# Patient Record
Sex: FEMALE | Race: WHITE | NOT HISPANIC OR LATINO | Employment: FULL TIME | URBAN - METROPOLITAN AREA
[De-identification: names, ages, dates, MRNs, and addresses within clinical notes are randomized per-mention and may not be internally consistent; named-entity substitution may affect disease eponyms.]

---

## 2017-06-06 ENCOUNTER — TELEPHONE (OUTPATIENT)
Dept: OBSTETRICS AND GYNECOLOGY | Facility: CLINIC | Age: 52
End: 2017-06-06

## 2017-06-06 NOTE — TELEPHONE ENCOUNTER
----- Message from Bradley Au sent at 6/6/2017  1:37 PM CDT -----  Contact: Patient   x_ 1st Request  _ 2nd Request  _ 3rd Request    Who: YVAN JOSEPH [[id number]]    Why: Patient is calling in regards to scheduling a annual appointment. Patient is needing a call back.    What Number to Call Back: [phone]    When to Expect a call back: (Before the end of the day)  -- if call after 3:00 call back will be tomorrow.

## 2017-07-18 ENCOUNTER — OFFICE VISIT (OUTPATIENT)
Dept: OBSTETRICS AND GYNECOLOGY | Facility: CLINIC | Age: 52
End: 2017-07-18
Payer: COMMERCIAL

## 2017-07-18 VITALS
SYSTOLIC BLOOD PRESSURE: 122 MMHG | DIASTOLIC BLOOD PRESSURE: 82 MMHG | BODY MASS INDEX: 24.34 KG/M2 | HEIGHT: 66 IN | WEIGHT: 151.44 LBS

## 2017-07-18 DIAGNOSIS — N95.2 VAGINAL ATROPHY: ICD-10-CM

## 2017-07-18 DIAGNOSIS — N89.8 VAGINAL DISCHARGE: ICD-10-CM

## 2017-07-18 DIAGNOSIS — Z12.4 PAP SMEAR FOR CERVICAL CANCER SCREENING: Primary | ICD-10-CM

## 2017-07-18 DIAGNOSIS — Z12.31 VISIT FOR SCREENING MAMMOGRAM: ICD-10-CM

## 2017-07-18 DIAGNOSIS — Z78.0 MENOPAUSE: ICD-10-CM

## 2017-07-18 DIAGNOSIS — Z01.419 WELL WOMAN EXAM WITH ROUTINE GYNECOLOGICAL EXAM: ICD-10-CM

## 2017-07-18 LAB
CANDIDA RRNA VAG QL PROBE: NEGATIVE
G VAGINALIS RRNA GENITAL QL PROBE: NEGATIVE
T VAGINALIS RRNA GENITAL QL PROBE: NEGATIVE

## 2017-07-18 PROCEDURE — 99386 PREV VISIT NEW AGE 40-64: CPT | Mod: S$GLB,,, | Performed by: OBSTETRICS & GYNECOLOGY

## 2017-07-18 PROCEDURE — 87660 TRICHOMONAS VAGIN DIR PROBE: CPT

## 2017-07-18 PROCEDURE — 99999 PR PBB SHADOW E&M-EST. PATIENT-LVL II: CPT | Mod: PBBFAC,,, | Performed by: OBSTETRICS & GYNECOLOGY

## 2017-07-18 PROCEDURE — 87480 CANDIDA DNA DIR PROBE: CPT

## 2017-07-18 PROCEDURE — 88175 CYTOPATH C/V AUTO FLUID REDO: CPT

## 2017-07-18 RX ORDER — ESTRADIOL 0.1 MG/G
1 CREAM VAGINAL DAILY
Qty: 42.5 G | Refills: 1 | Status: SHIPPED | OUTPATIENT
Start: 2017-07-18 | End: 2018-03-13

## 2017-07-18 NOTE — PROGRESS NOTES
Subjective:       Patient ID: Chaya Tam is a 52 y.o. female.    Chief Complaint:  Well Woman (vaginal dryness)      History of Present Illness  HPI  This 52 yr old female former pt of Dr Reece is here for routine exam and has history of abnormal pap yrs ago with cautery after biopsy and no abnormal since.  She is due for pap and mammogram and only complaint is vaginal pain with intercourse and dryness.  We discussed and will start on atrophy protocol.We spent a significant amt of time discussing estrogen replacement theropy.  We discussed the risks and benefits including breast cancer and embolic risk, osteoporosis and heart and colon health benefits.  Pt understands that there are many different ways to take estrogen and many different doses and that she does not have to take long term unless she chooses.  She understands that if she still has her uterus, she will need to take progesterone with this to decrease risk of endometrial cancer.We discussed side effects that might include but not limited to headaches, edema, nauseasness,     GYN & OB History  No LMP recorded. Patient is postmenopausal.   Date of Last Pap: No result found    OB History    Para Term  AB Living   3       1     SAB TAB Ectopic Multiple Live Births   1              # Outcome Date GA Lbr Benson/2nd Weight Sex Delivery Anes PTL Lv   3             2             1 SAB                   Review of Systems  Review of Systems   Constitutional: Positive for fatigue. Negative for chills and fever.   Respiratory: Negative for shortness of breath.    Cardiovascular: Negative for chest pain.   Gastrointestinal: Negative for abdominal pain, nausea and vomiting.   Genitourinary: Positive for dyspareunia. Negative for difficulty urinating, genital sores, menstrual problem, pelvic pain, vaginal bleeding, vaginal discharge and vaginal pain.   Skin: Negative for wound.   Hematological: Negative for adenopathy.           Objective:     Physical Exam:   Constitutional: She is oriented to person, place, and time. She appears well-developed and well-nourished.    HENT:   Head: Normocephalic.    Eyes: EOM are normal.    Neck: Normal range of motion.    Cardiovascular: Normal rate.     Pulmonary/Chest: Effort normal. She exhibits no mass and no tenderness. Right breast exhibits no inverted nipple, no mass, no skin change and no tenderness. Left breast exhibits no inverted nipple, no mass, no skin change and no tenderness.        Abdominal: Soft. She exhibits no distension. There is no tenderness.     Genitourinary: Vagina normal and uterus normal. There is no rash, tenderness or lesion on the right labia. There is no rash, tenderness or lesion on the left labia. Uterus is not tender. Cervix is normal. Right adnexum displays no mass, no tenderness and no fullness. Left adnexum displays no mass, no tenderness and no fullness. Cervix exhibits no discharge.           Musculoskeletal: Normal range of motion.       Neurological: She is alert and oriented to person, place, and time.    Skin: Skin is warm and dry.    Psychiatric: She has a normal mood and affect.          Assessment:        1. Pap smear for cervical cancer screening    2. Visit for screening mammogram    3. Well woman exam with routine gynecological exam    4. Menopause    5. Vaginal atrophy               Plan:      Atrophy protocol  website given to pt  Estrace nightly for 2 weeks then 3 times per week.  Follow up in one month to disucss potential systemic estrogen and testosterone.

## 2017-07-21 ENCOUNTER — HOSPITAL ENCOUNTER (OUTPATIENT)
Dept: RADIOLOGY | Facility: HOSPITAL | Age: 52
Discharge: HOME | End: 2017-07-21
Attending: OBSTETRICS & GYNECOLOGY
Payer: COMMERCIAL

## 2017-07-21 VITALS — HEIGHT: 66 IN | WEIGHT: 151 LBS | BODY MASS INDEX: 24.27 KG/M2

## 2017-07-21 DIAGNOSIS — Z12.31 VISIT FOR SCREENING MAMMOGRAM: ICD-10-CM

## 2017-07-21 PROCEDURE — 77067 SCR MAMMO BI INCL CAD: CPT | Mod: TC

## 2017-07-21 PROCEDURE — 77063 BREAST TOMOSYNTHESIS BI: CPT | Mod: 26,,, | Performed by: RADIOLOGY

## 2017-07-21 PROCEDURE — 77067 SCR MAMMO BI INCL CAD: CPT | Mod: 26,,, | Performed by: RADIOLOGY

## 2017-08-15 ENCOUNTER — TELEPHONE (OUTPATIENT)
Dept: OBSTETRICS AND GYNECOLOGY | Facility: CLINIC | Age: 52
End: 2017-08-15

## 2017-08-15 NOTE — TELEPHONE ENCOUNTER
----- Message from Bradley Au sent at 8/15/2017  9:08 AM CDT -----  Contact: pt  x_ 1st Request  _ 2nd Request  _ 3rd Request    Who: pt    Why: pt is needing to reschedule her follow up appointment    What Number to Call Back: [phone]    When to Expect a call back: (Before the end of the day)  -- if call after 3:00 call back will be tomorrow.

## 2017-09-01 ENCOUNTER — OFFICE VISIT (OUTPATIENT)
Dept: OBSTETRICS AND GYNECOLOGY | Facility: CLINIC | Age: 52
End: 2017-09-01
Attending: OBSTETRICS & GYNECOLOGY
Payer: COMMERCIAL

## 2017-09-01 VITALS
DIASTOLIC BLOOD PRESSURE: 82 MMHG | BODY MASS INDEX: 25.12 KG/M2 | SYSTOLIC BLOOD PRESSURE: 120 MMHG | HEIGHT: 66 IN | WEIGHT: 156.31 LBS

## 2017-09-01 DIAGNOSIS — N95.2 VAGINAL ATROPHY: ICD-10-CM

## 2017-09-01 DIAGNOSIS — Z78.0 MENOPAUSE: Primary | ICD-10-CM

## 2017-09-01 PROCEDURE — 3008F BODY MASS INDEX DOCD: CPT | Mod: S$GLB,,, | Performed by: OBSTETRICS & GYNECOLOGY

## 2017-09-01 PROCEDURE — 99999 PR PBB SHADOW E&M-EST. PATIENT-LVL II: CPT | Mod: PBBFAC,,, | Performed by: OBSTETRICS & GYNECOLOGY

## 2017-09-01 PROCEDURE — 99213 OFFICE O/P EST LOW 20 MIN: CPT | Mod: S$GLB,,, | Performed by: OBSTETRICS & GYNECOLOGY

## 2017-09-01 RX ORDER — ESTRADIOL AND NORETHINDRONE ACETATE 1; .5 MG/1; MG/1
1 TABLET ORAL DAILY
Qty: 30 TABLET | Refills: 11 | Status: SHIPPED | OUTPATIENT
Start: 2017-09-01 | End: 2019-01-11 | Stop reason: CLARIF

## 2017-09-01 NOTE — PROGRESS NOTES
Subjective:       Patient ID: Chaya Tam is a 52 y.o. female.    Chief Complaint:  Follow-up (hormone med review)      History of Present Illness  HPI  This 52 yr old female Is here to follow up on doing the atrophy protocol for painful intercourse and atrophy and she is doing much better.  Today we will talk about systemic estrogen and testosterone.  We spent a significant amt of time discussing estrogen replacement theropy.  We discussed the risks and benefits including breast cancer and embolic risk, osteoporosis and heart and colon health benefits.  Pt understands that there are many different ways to take estrogen and many different doses and that she does not have to take long term unless she chooses.  She understands that if she still has her uterus, she will need to take progesterone with this to decrease risk of endometrial cancer.We discussed side effects that might include but not limited to headaches, edema, nauseasness,   Her symptoms are more subtle but we discussed long term benefits vs risks and she desires.  GYN & OB History  No LMP recorded. Patient is postmenopausal.   Date of Last Pap: 2017    OB History    Para Term  AB Living   4 3 3   1     SAB TAB Ectopic Multiple Live Births   1              # Outcome Date GA Lbr Benson/2nd Weight Sex Delivery Anes PTL Lv   4 Term            3 Term            2 Term            1 SAB                   Review of Systems  Review of Systems   Constitutional: Negative for chills and fever.   Respiratory: Negative for shortness of breath.    Cardiovascular: Negative for chest pain.   Gastrointestinal: Negative for abdominal pain, nausea and vomiting.   Genitourinary: Negative for difficulty urinating, dyspareunia, genital sores, menstrual problem, pelvic pain, vaginal bleeding, vaginal discharge and vaginal pain.   Skin: Negative for wound.   Hematological: Negative for adenopathy.           Objective:    Physical Exam       Assessment:         1. Menopause    2. Vaginal atrophy               Plan:      Start on activella and might be able to stop vaginal estrogen soon.  Follow up in month or two for potientially starting on testosterone

## 2018-02-20 ENCOUNTER — TELEPHONE (OUTPATIENT)
Dept: OBSTETRICS AND GYNECOLOGY | Facility: CLINIC | Age: 53
End: 2018-02-20

## 2018-02-20 NOTE — TELEPHONE ENCOUNTER
----- Message from Jeanette Leon sent at 2/19/2018  4:24 PM CST -----  Contact: self  Pt needing a call back, regarding an appt, pt can be reached at [phone].

## 2018-03-08 ENCOUNTER — OFFICE VISIT (OUTPATIENT)
Dept: OBSTETRICS AND GYNECOLOGY | Facility: CLINIC | Age: 53
End: 2018-03-08
Attending: OBSTETRICS & GYNECOLOGY
Payer: COMMERCIAL

## 2018-03-08 VITALS
HEIGHT: 66 IN | DIASTOLIC BLOOD PRESSURE: 76 MMHG | WEIGHT: 147.25 LBS | SYSTOLIC BLOOD PRESSURE: 110 MMHG | BODY MASS INDEX: 23.66 KG/M2

## 2018-03-08 DIAGNOSIS — N95.0 POST-MENOPAUSAL BLEEDING: Primary | ICD-10-CM

## 2018-03-08 PROCEDURE — 99213 OFFICE O/P EST LOW 20 MIN: CPT | Mod: S$GLB,,, | Performed by: OBSTETRICS & GYNECOLOGY

## 2018-03-08 RX ORDER — VALACYCLOVIR HYDROCHLORIDE 1 G/1
TABLET, FILM COATED ORAL
Refills: 3 | COMMUNITY
Start: 2018-01-29

## 2018-03-08 NOTE — PROGRESS NOTES
Subjective:       Patient ID: Chaya Tam is a 53 y.o. female.    Chief Complaint:  Follow-up      History of Present Illness  HPI  This 53 yr old P3 female is here for post menopausal bleeding.  She was started on activella on top of the atrophy protocol in the fall .  Has done well until last week had three days of bleeding out of the blue.  No pain and now has stopped.  We discussed and will get ultrasound and follow up in month or so to discuss changing her meds and potientially adding testosterone.  We had this discussion before and will probably just change her to combo  [facility].    Pt understands and agrees.    GYN & OB History  No LMP recorded. Patient is postmenopausal.   Date of Last Pap: 2017    OB History    Para Term  AB Living   4 3 3   1     SAB TAB Ectopic Multiple Live Births   1              # Outcome Date GA Lbr Benson/2nd Weight Sex Delivery Anes PTL Lv   4 Term            3 Term            2 Term            1 SAB                   Review of Systems  Review of Systems   Constitutional: Negative for chills and fever.   Respiratory: Negative for shortness of breath.    Cardiovascular: Negative for chest pain.   Gastrointestinal: Negative for abdominal pain, nausea and vomiting.   Genitourinary: Positive for vaginal bleeding. Negative for difficulty urinating, dyspareunia, genital sores, menstrual problem, pelvic pain, vaginal discharge and vaginal pain.   Skin: Negative for wound.   Hematological: Negative for adenopathy.           Objective:    Physical Exam       Assessment:        1. Post-menopausal bleeding               Plan:      Ultrasound and follow up on my chart with results and follow up with me later for hrt changes as above

## 2018-03-09 ENCOUNTER — HOSPITAL ENCOUNTER (OUTPATIENT)
Dept: RADIOLOGY | Facility: OTHER | Age: 53
Discharge: HOME | End: 2018-03-09
Attending: OBSTETRICS & GYNECOLOGY
Payer: COMMERCIAL

## 2018-03-09 DIAGNOSIS — N95.0 POST-MENOPAUSAL BLEEDING: ICD-10-CM

## 2018-03-09 PROCEDURE — 76856 US EXAM PELVIC COMPLETE: CPT | Mod: 26,,, | Performed by: RADIOLOGY

## 2018-03-09 PROCEDURE — 76830 TRANSVAGINAL US NON-OB: CPT | Mod: TC

## 2018-03-09 PROCEDURE — 76830 TRANSVAGINAL US NON-OB: CPT | Mod: 26,,, | Performed by: RADIOLOGY

## 2018-03-13 ENCOUNTER — TELEPHONE (OUTPATIENT)
Dept: OBSTETRICS AND GYNECOLOGY | Facility: CLINIC | Age: 53
End: 2018-03-13

## 2018-03-13 ENCOUNTER — OFFICE VISIT (OUTPATIENT)
Dept: OBSTETRICS AND GYNECOLOGY | Facility: CLINIC | Age: 53
End: 2018-03-13
Attending: OBSTETRICS & GYNECOLOGY
Payer: COMMERCIAL

## 2018-03-13 VITALS
SYSTOLIC BLOOD PRESSURE: 130 MMHG | BODY MASS INDEX: 23.49 KG/M2 | HEIGHT: 66 IN | DIASTOLIC BLOOD PRESSURE: 82 MMHG | WEIGHT: 146.19 LBS

## 2018-03-13 DIAGNOSIS — N95.0 POST-MENOPAUSAL BLEEDING: Primary | ICD-10-CM

## 2018-03-13 PROCEDURE — 58100 BIOPSY OF UTERUS LINING: CPT | Mod: S$GLB,,, | Performed by: OBSTETRICS & GYNECOLOGY

## 2018-03-13 PROCEDURE — 88305 TISSUE EXAM BY PATHOLOGIST: CPT | Mod: 26,,, | Performed by: PATHOLOGY

## 2018-03-13 PROCEDURE — 99213 OFFICE O/P EST LOW 20 MIN: CPT | Mod: 25,S$GLB,, | Performed by: OBSTETRICS & GYNECOLOGY

## 2018-03-13 PROCEDURE — 88305 TISSUE EXAM BY PATHOLOGIST: CPT | Performed by: PATHOLOGY

## 2018-03-13 NOTE — TELEPHONE ENCOUNTER
----- Message from Pam Reynolds MD sent at 3/13/2018 10:48 AM CDT -----  Please call in to [facility]    Estradiol 2mg/progesterone 300 mg/5mg capsule  Take one capsule nightly  Disp # 30 with 11 refils.

## 2018-03-13 NOTE — PROGRESS NOTES
This 53 yr old female is here for and endometrial biopsy.  She had ultrasound done for post menopausal bleeding  and was found to have thickened endometrial near the cervix and is menopausal.  We discussed the risks and benefits of the procedure and she signed her informed consent.  While in dorso lithotomy position and after uterus confirmed to be anteflexed, cervix was cleansed with betadine and the anterior cervix was grasped with a single toothed tenaculum and the sound was easily passed to 7cm and pipelle was easily passed as well and adequate tissue obtained.  All instruments were then removed and pt tolerated the procedure well.  Will follow up on [facility] with results  Specimen sent to pathology

## 2018-03-20 ENCOUNTER — TELEPHONE (OUTPATIENT)
Dept: OBSTETRICS AND GYNECOLOGY | Facility: CLINIC | Age: 53
End: 2018-03-20

## 2018-03-20 NOTE — TELEPHONE ENCOUNTER
----- Message from Bradley Au sent at 3/20/2018  3:11 PM CDT -----  Contact: pt  x_ 1st Request  _ 2nd Request  _ 3rd Request    Who: pt    Why: test results    What Number to Call Back: [phone]    When to Expect a call back: (Before the end of the day)  -- if call after 3:00 call back will be tomorrow.

## 2018-03-20 NOTE — TELEPHONE ENCOUNTER
Spoke with patient and informed her EMBX results are negative. No abnormality found. Patient verbalized understanding all information

## 2018-10-08 ENCOUNTER — PATIENT MESSAGE (OUTPATIENT)
Dept: OBSTETRICS AND GYNECOLOGY | Facility: CLINIC | Age: 53
End: 2018-10-08

## 2019-01-11 ENCOUNTER — ANESTHESIA EVENT (OUTPATIENT)
Dept: SURGERY | Facility: OTHER | Age: 54
End: 2019-01-11
Payer: COMMERCIAL

## 2019-01-11 ENCOUNTER — HOSPITAL ENCOUNTER (OUTPATIENT)
Dept: PREADMISSION TESTING | Facility: OTHER | Age: 54
Discharge: HOME | End: 2019-01-11
Attending: ORTHOPAEDIC SURGERY
Payer: COMMERCIAL

## 2019-01-11 VITALS
HEIGHT: 66 IN | RESPIRATION RATE: 16 BRPM | TEMPERATURE: 98 F | WEIGHT: 150 LBS | BODY MASS INDEX: 24.11 KG/M2 | OXYGEN SATURATION: 99 % | HEART RATE: 66 BPM | DIASTOLIC BLOOD PRESSURE: 78 MMHG | SYSTOLIC BLOOD PRESSURE: 127 MMHG

## 2019-01-11 RX ORDER — FAMOTIDINE 20 MG/1
20 TABLET, FILM COATED ORAL
Status: CANCELLED | OUTPATIENT
Start: 2019-01-11 | End: 2019-01-11

## 2019-01-11 RX ORDER — OXYCODONE HYDROCHLORIDE 5 MG/1
5 TABLET ORAL ONCE AS NEEDED
Status: CANCELLED | OUTPATIENT
Start: 2019-01-11 | End: 2030-06-09

## 2019-01-11 RX ORDER — LIDOCAINE HYDROCHLORIDE 10 MG/ML
0.5 INJECTION, SOLUTION EPIDURAL; INFILTRATION; INTRACAUDAL; PERINEURAL ONCE
Status: CANCELLED | OUTPATIENT
Start: 2019-01-11 | End: 2019-01-11

## 2019-01-11 RX ORDER — CHOLECALCIFEROL (VITAMIN D3) 25 MCG
1000 TABLET ORAL DAILY
COMMUNITY
End: 2021-11-10 | Stop reason: SDUPTHER

## 2019-01-11 RX ORDER — SODIUM CHLORIDE, SODIUM LACTATE, POTASSIUM CHLORIDE, CALCIUM CHLORIDE 600; 310; 30; 20 MG/100ML; MG/100ML; MG/100ML; MG/100ML
INJECTION, SOLUTION INTRAVENOUS CONTINUOUS
Status: CANCELLED | OUTPATIENT
Start: 2019-01-11

## 2019-01-11 RX ORDER — MIDAZOLAM HYDROCHLORIDE 5 MG/ML
5 INJECTION INTRAMUSCULAR; INTRAVENOUS ONCE AS NEEDED
Status: CANCELLED | OUTPATIENT
Start: 2019-01-11 | End: 2030-06-09

## 2019-01-11 NOTE — DISCHARGE INSTRUCTIONS
PRE-ADMIT TESTING -  [phone]    [address]  [place] [facility]          Your surgery has been scheduled at [facility]. We are pleased to have the opportunity to serve you. For Further Information please call [phone].    On the day of surgery please report to the Information Desk on the 1st floor.    · CONTACT YOUR PHYSICIAN'S OFFICE THE DAY PRIOR TO YOUR SURGERY TO OBTAIN YOUR ARRIVAL TIME.     · The evening before surgery do not eat anything after 9 p.m. ( this includes hard candy, chewing gum and mints).  You may only have GATORADE, POWERADE AND WATER  from 9 p.m. until you leave your home.   DO NOT DRINK ANY LIQUIDS ON THE WAY TO THE HOSPITAL.      SPECIAL MEDICATION INSTRUCTIONS: TAKE medications checked off by the Anesthesiologist on your Medication List.    Angiogram Patients: Take medications as instructed by your physician, including aspirin.     Surgery Patients:    If you take ASPIRIN - Your PHYSICIAN/SURGEON will need to inform you IF/OR when you need to stop taking aspirin prior to your surgery.     Do Not take any medications containing IBUPROFEN.  Do Not Wear any make-up or dark nail polish   (especially eye make-up) to surgery. If you come to surgery with makeup on you will be required to remove the makeup or nail polish.    Do not shave your surgical area at least 5 days prior to your surgery. The surgical prep will be performed at the hospital according to Infection Control regulations.    Leave all valuables at home.   Do Not wear any jewelry or watches, including any metal in body piercings.  Contact Lens must be removed before surgery. Either do not wear the contact lens or bring a case and solution for storage.  Please bring a container for eyeglasses or dentures as required.  Bring any paperwork your physician has provided, such as consent forms,  history and physicals, doctor's orders, etc.   Bring comfortable clothes that are loose fitting to wear upon  discharge. Take into consideration the type of surgery being performed.  Maintain your diet as advised per your physician the day prior to surgery.      Adequate rest the night before surgery is advised.   Park in the Parking lot behind the hospital or in the [facility] across the street from the parking lot. Parking is complimentary.  If you will be discharged the same day as your procedure, please arrange for a responsible adult to drive you home or to accompany you if traveling by taxi.   YOU WILL NOT BE PERMITTED TO DRIVE OR TO LEAVE THE HOSPITAL ALONE AFTER SURGERY.   It is strongly recommended that you arrange for someone to remain with you for the first 24 hrs following your surgery.       Thank you for your cooperation.  The Staff of [facility].        Bathing Instructions                                                                 Please shower the evening before and morning of your procedure with    ANTIBACTERIAL SOAP. ( DIAL, etc )  Concentrate on the surgical area   for at least 3 minutes and rinse completely. Dry off as usual.   Do not use any deodorant, powder, body lotions, perfume, after shave or    cologne.

## 2019-01-11 NOTE — ANESTHESIA PREPROCEDURE EVALUATION
01/11/2019  Chaya Tam is a 53 y.o., female.    Anesthesia Evaluation    I have reviewed the Patient Summary Reports.    I have reviewed the Nursing Notes.   I have reviewed the Medications.     Review of Systems  Anesthesia Hx:  No problems with previous Anesthesia    Social:  Social Alcohol Use, Non-Smoker    Hematology/Oncology:  Hematology Normal   Oncology Normal     EENT/Dental:EENT/Dental Normal   Cardiovascular:  Cardiovascular Normal Exercise tolerance: good  H/O palpitations.   Pulmonary:  Pulmonary Normal    Renal/:   Chronic Renal Disease renal calculi    Hepatic/GI:  Hepatic/GI Normal    Neurological:  Neurology Normal    Endocrine:  Endocrine Normal    Dermatological:  Skin Normal    Psych:  Psychiatric Normal           Physical Exam  General:  Well nourished    Airway/Jaw/Neck:  Airway Findings: Mouth Opening: Normal Tongue: Normal  General Airway Assessment: Adult, Good  Mallampati: I  TM Distance: Normal, at least 6 cm  Jaw/Neck Findings:  Neck ROM: Normal ROM      Dental:  Dental Findings: In tact, molar caps        Mental Status:  Mental Status Findings:  Cooperative, Alert and Oriented         Anesthesia Plan  Type of Anesthesia, risks & benefits discussed:  Anesthesia Type:  general  Patient's Preference:   Intra-op Monitoring Plan: standard ASA monitors  Intra-op Monitoring Plan Comments:   Post Op Pain Control Plan: per primary service following discharge from PACU  Post Op Pain Control Plan Comments:   Induction:    Beta Blocker:         Informed Consent: Patient understands risks and agrees with Anesthesia plan.  Questions answered. Anesthesia consent signed with patient.  ASA Score: 1     Day of Surgery Review of History & Physical:    H&P update referred to the surgeon.     Anesthesia Plan Notes: No labs.        Ready For Surgery From Anesthesia Perspective.

## 2019-01-14 NOTE — H&P
Subjective:     Rescheduled from a few weeks ago.  Popping tenderness left knee medial meniscusseen on MRI many month history symptoms no improvement admitted for left knee arthroscopy    Patient Active Problem List    Diagnosis Date Noted    Palpitations 03/20/2015     Past Medical History:   Diagnosis Date    Kidney stones       Past Surgical History:   Procedure Laterality Date    DILATION AND CURETTAGE OF UTERUS      kidney stones        No medications prior to admission.     Review of patient's allergies indicates:   Allergen Reactions    Bactrim [sulfamethoxazole-trimethoprim] Rash      Social History     Tobacco Use    Smoking status: Never Smoker    Smokeless tobacco: Never Used   Substance Use Topics    Alcohol use: Yes     Alcohol/week: 3.6 oz     Types: 3 Glasses of wine, 3 Cans of beer per week     Comment: socially      Family History   Problem Relation Age of Onset    Stroke Mother     Breast cancer Sister     Heart attack Neg Hx     Heart disease Neg Hx     Colon cancer Neg Hx     Ovarian cancer Neg Hx       Review of Systems  Pertinent items are noted in HPI.    Objective:     No data found.  Heart regular lungs clear tenderness posterior and lateral    Imaging Review  Medial meniscal tear minimal degenerative changes    Assessment:     There are no hospital problems to display for this patient.      Plan:     The various methods of treatment have been discussed with the patient and family.   After consideration of risks, benefits and other options for treatment, the patient has consented to surgical interventions (Significant wrist discussedwith age and progressive arthritis).  Questions were answered and Pre-op teaching was done by me.

## 2019-01-18 ENCOUNTER — ANESTHESIA (OUTPATIENT)
Dept: SURGERY | Facility: OTHER | Age: 54
End: 2019-01-18
Payer: COMMERCIAL

## 2019-01-18 ENCOUNTER — HOSPITAL ENCOUNTER (OUTPATIENT)
Facility: OTHER | Age: 54
Discharge: HOME | End: 2019-01-18
Attending: ORTHOPAEDIC SURGERY | Admitting: ORTHOPAEDIC SURGERY
Payer: COMMERCIAL

## 2019-01-18 VITALS
SYSTOLIC BLOOD PRESSURE: 131 MMHG | HEIGHT: 66 IN | DIASTOLIC BLOOD PRESSURE: 76 MMHG | TEMPERATURE: 98 F | OXYGEN SATURATION: 100 % | RESPIRATION RATE: 16 BRPM | HEART RATE: 72 BPM | WEIGHT: 150 LBS | BODY MASS INDEX: 24.11 KG/M2

## 2019-01-18 DIAGNOSIS — S83.249A MMT (MEDIAL MENISCUS TEAR): ICD-10-CM

## 2019-01-18 PROCEDURE — 25000003 PHARM REV CODE 250: Performed by: ANESTHESIOLOGY

## 2019-01-18 PROCEDURE — 37000009 HC ANESTHESIA EA ADD 15 MINS: Performed by: ORTHOPAEDIC SURGERY

## 2019-01-18 PROCEDURE — 36000710: Performed by: ORTHOPAEDIC SURGERY

## 2019-01-18 PROCEDURE — 63600175 PHARM REV CODE 636 W HCPCS: Performed by: SPECIALIST

## 2019-01-18 PROCEDURE — 71000033 HC RECOVERY, INTIAL HOUR: Performed by: ORTHOPAEDIC SURGERY

## 2019-01-18 PROCEDURE — 25000003 PHARM REV CODE 250: Performed by: SPECIALIST

## 2019-01-18 PROCEDURE — 25000003 PHARM REV CODE 250: Performed by: NURSE ANESTHETIST, CERTIFIED REGISTERED

## 2019-01-18 PROCEDURE — 37000008 HC ANESTHESIA 1ST 15 MINUTES: Performed by: ORTHOPAEDIC SURGERY

## 2019-01-18 PROCEDURE — 63600175 PHARM REV CODE 636 W HCPCS: Performed by: ORTHOPAEDIC SURGERY

## 2019-01-18 PROCEDURE — 71000016 HC POSTOP RECOV ADDL HR: Performed by: ORTHOPAEDIC SURGERY

## 2019-01-18 PROCEDURE — 63600175 PHARM REV CODE 636 W HCPCS: Performed by: NURSE ANESTHETIST, CERTIFIED REGISTERED

## 2019-01-18 PROCEDURE — 36000711: Performed by: ORTHOPAEDIC SURGERY

## 2019-01-18 PROCEDURE — 27201423 OPTIME MED/SURG SUP & DEVICES STERILE SUPPLY: Performed by: ORTHOPAEDIC SURGERY

## 2019-01-18 PROCEDURE — 71000015 HC POSTOP RECOV 1ST HR: Performed by: ORTHOPAEDIC SURGERY

## 2019-01-18 RX ORDER — SODIUM CHLORIDE 9 MG/ML
INJECTION, SOLUTION INTRAVENOUS CONTINUOUS
Status: ACTIVE | OUTPATIENT
Start: 2019-01-18

## 2019-01-18 RX ORDER — LIDOCAINE HCL/PF 100 MG/5ML
SYRINGE (ML) INTRAVENOUS
Status: DISCONTINUED | OUTPATIENT
Start: 2019-01-18 | End: 2019-01-18

## 2019-01-18 RX ORDER — SODIUM CHLORIDE 0.9 % (FLUSH) 0.9 %
3 SYRINGE (ML) INJECTION
Status: DISCONTINUED | OUTPATIENT
Start: 2019-01-18 | End: 2019-01-18 | Stop reason: HOSPADM

## 2019-01-18 RX ORDER — ONDANSETRON HYDROCHLORIDE 2 MG/ML
INJECTION, SOLUTION INTRAMUSCULAR; INTRAVENOUS
Status: DISCONTINUED | OUTPATIENT
Start: 2019-01-18 | End: 2019-01-18

## 2019-01-18 RX ORDER — MIDAZOLAM HYDROCHLORIDE 5 MG/ML
5 INJECTION INTRAMUSCULAR; INTRAVENOUS ONCE AS NEEDED
Status: COMPLETED | OUTPATIENT
Start: 2019-01-18 | End: 2019-01-18

## 2019-01-18 RX ORDER — SODIUM CHLORIDE, SODIUM LACTATE, POTASSIUM CHLORIDE, CALCIUM CHLORIDE 600; 310; 30; 20 MG/100ML; MG/100ML; MG/100ML; MG/100ML
INJECTION, SOLUTION INTRAVENOUS CONTINUOUS
Status: DISCONTINUED | OUTPATIENT
Start: 2019-01-18 | End: 2019-01-18 | Stop reason: HOSPADM

## 2019-01-18 RX ORDER — GLYCOPYRROLATE 0.2 MG/ML
INJECTION INTRAMUSCULAR; INTRAVENOUS
Status: DISCONTINUED | OUTPATIENT
Start: 2019-01-18 | End: 2019-01-18

## 2019-01-18 RX ORDER — OXYCODONE HYDROCHLORIDE 5 MG/1
5 TABLET ORAL ONCE AS NEEDED
Status: DISCONTINUED | OUTPATIENT
Start: 2019-01-18 | End: 2019-01-18 | Stop reason: HOSPADM

## 2019-01-18 RX ORDER — FENTANYL CITRATE 50 UG/ML
25 INJECTION, SOLUTION INTRAMUSCULAR; INTRAVENOUS EVERY 5 MIN PRN
Status: DISCONTINUED | OUTPATIENT
Start: 2019-01-18 | End: 2019-01-18 | Stop reason: HOSPADM

## 2019-01-18 RX ORDER — PHENYLEPHRINE HYDROCHLORIDE 10 MG/ML
INJECTION INTRAVENOUS
Status: DISCONTINUED | OUTPATIENT
Start: 2019-01-18 | End: 2019-01-18

## 2019-01-18 RX ORDER — HYDROCODONE BITARTRATE AND ACETAMINOPHEN 5; 325 MG/1; MG/1
1 TABLET ORAL EVERY 6 HOURS PRN
Qty: 30 TABLET | Refills: 0 | Status: SHIPPED | OUTPATIENT
Start: 2019-01-18 | End: 2020-08-18 | Stop reason: DRUGHIGH

## 2019-01-18 RX ORDER — OXYCODONE HYDROCHLORIDE 5 MG/1
5 TABLET ORAL
Status: DISCONTINUED | OUTPATIENT
Start: 2019-01-18 | End: 2019-01-18 | Stop reason: HOSPADM

## 2019-01-18 RX ORDER — CEFAZOLIN SODIUM 2 G/50ML
2 SOLUTION INTRAVENOUS
Status: DISCONTINUED | OUTPATIENT
Start: 2019-01-18 | End: 2019-01-18

## 2019-01-18 RX ORDER — LIDOCAINE HYDROCHLORIDE 10 MG/ML
0.5 INJECTION, SOLUTION EPIDURAL; INFILTRATION; INTRACAUDAL; PERINEURAL ONCE
Status: DISCONTINUED | OUTPATIENT
Start: 2019-01-18 | End: 2019-01-18 | Stop reason: HOSPADM

## 2019-01-18 RX ORDER — HYDROCODONE BITARTRATE AND ACETAMINOPHEN 5; 325 MG/1; MG/1
1 TABLET ORAL EVERY 4 HOURS PRN
Status: DISCONTINUED | OUTPATIENT
Start: 2019-01-18 | End: 2019-01-18 | Stop reason: HOSPADM

## 2019-01-18 RX ORDER — ONDANSETRON 2 MG/ML
4 INJECTION INTRAMUSCULAR; INTRAVENOUS DAILY PRN
Status: DISCONTINUED | OUTPATIENT
Start: 2019-01-18 | End: 2019-01-18 | Stop reason: HOSPADM

## 2019-01-18 RX ORDER — CEFAZOLIN SODIUM 1 G/3ML
2 INJECTION, POWDER, FOR SOLUTION INTRAMUSCULAR; INTRAVENOUS
Status: COMPLETED | OUTPATIENT
Start: 2019-01-18 | End: 2019-01-18

## 2019-01-18 RX ORDER — FAMOTIDINE 20 MG/1
20 TABLET, FILM COATED ORAL
Status: COMPLETED | OUTPATIENT
Start: 2019-01-18 | End: 2019-01-18

## 2019-01-18 RX ORDER — PROPOFOL 10 MG/ML
VIAL (ML) INTRAVENOUS
Status: DISCONTINUED | OUTPATIENT
Start: 2019-01-18 | End: 2019-01-18

## 2019-01-18 RX ORDER — SODIUM CHLORIDE 0.9 % (FLUSH) 0.9 %
5 SYRINGE (ML) INJECTION
Status: DISCONTINUED | OUTPATIENT
Start: 2019-01-18 | End: 2019-01-18 | Stop reason: HOSPADM

## 2019-01-18 RX ORDER — ROPIVACAINE HYDROCHLORIDE 5 MG/ML
INJECTION, SOLUTION EPIDURAL; INFILTRATION; PERINEURAL
Status: DISCONTINUED | OUTPATIENT
Start: 2019-01-18 | End: 2019-01-18 | Stop reason: HOSPADM

## 2019-01-18 RX ORDER — FENTANYL CITRATE 50 UG/ML
INJECTION, SOLUTION INTRAMUSCULAR; INTRAVENOUS
Status: DISCONTINUED | OUTPATIENT
Start: 2019-01-18 | End: 2019-01-18

## 2019-01-18 RX ORDER — HYDROCODONE BITARTRATE AND ACETAMINOPHEN 10; 325 MG/1; MG/1
1 TABLET ORAL EVERY 4 HOURS PRN
Status: DISCONTINUED | OUTPATIENT
Start: 2019-01-18 | End: 2019-01-18 | Stop reason: HOSPADM

## 2019-01-18 RX ORDER — DEXAMETHASONE SODIUM PHOSPHATE 4 MG/ML
INJECTION, SOLUTION INTRA-ARTICULAR; INTRALESIONAL; INTRAMUSCULAR; INTRAVENOUS; SOFT TISSUE
Status: DISCONTINUED | OUTPATIENT
Start: 2019-01-18 | End: 2019-01-18

## 2019-01-18 RX ORDER — KETOROLAC TROMETHAMINE 30 MG/ML
INJECTION, SOLUTION INTRAMUSCULAR; INTRAVENOUS
Status: DISCONTINUED | OUTPATIENT
Start: 2019-01-18 | End: 2019-01-18 | Stop reason: HOSPADM

## 2019-01-18 RX ORDER — MEPERIDINE HYDROCHLORIDE 25 MG/ML
12.5 INJECTION INTRAMUSCULAR; INTRAVENOUS; SUBCUTANEOUS ONCE AS NEEDED
Status: DISCONTINUED | OUTPATIENT
Start: 2019-01-18 | End: 2019-01-18 | Stop reason: HOSPADM

## 2019-01-18 RX ADMIN — PHENYLEPHRINE HYDROCHLORIDE 200 MCG: 10 INJECTION INTRAVENOUS at 07:01

## 2019-01-18 RX ADMIN — LIDOCAINE HYDROCHLORIDE 80 MG: 20 INJECTION, SOLUTION INTRAVENOUS at 06:01

## 2019-01-18 RX ADMIN — SODIUM CHLORIDE, SODIUM LACTATE, POTASSIUM CHLORIDE, AND CALCIUM CHLORIDE: 600; 310; 30; 20 INJECTION, SOLUTION INTRAVENOUS at 06:01

## 2019-01-18 RX ADMIN — OXYCODONE HYDROCHLORIDE 5 MG: 5 TABLET ORAL at 07:01

## 2019-01-18 RX ADMIN — FENTANYL CITRATE 100 MCG: 50 INJECTION, SOLUTION INTRAMUSCULAR; INTRAVENOUS at 06:01

## 2019-01-18 RX ADMIN — ONDANSETRON 4 MG: 2 INJECTION, SOLUTION INTRAMUSCULAR; INTRAVENOUS at 07:01

## 2019-01-18 RX ADMIN — DEXAMETHASONE SODIUM PHOSPHATE 4 MG: 4 INJECTION, SOLUTION INTRAMUSCULAR; INTRAVENOUS at 07:01

## 2019-01-18 RX ADMIN — CEFAZOLIN 2 G: 330 INJECTION, POWDER, FOR SOLUTION INTRAMUSCULAR; INTRAVENOUS at 07:01

## 2019-01-18 RX ADMIN — PROPOFOL 160 MG: 10 INJECTION, EMULSION INTRAVENOUS at 06:01

## 2019-01-18 RX ADMIN — GLYCOPYRROLATE 0.2 MG: 0.2 INJECTION, SOLUTION INTRAMUSCULAR; INTRAVENOUS at 06:01

## 2019-01-18 RX ADMIN — FAMOTIDINE 20 MG: 20 TABLET, FILM COATED ORAL at 06:01

## 2019-01-18 RX ADMIN — MIDAZOLAM HYDROCHLORIDE 5 MG: 5 INJECTION, SOLUTION INTRAMUSCULAR; INTRAVENOUS at 06:01

## 2019-01-18 NOTE — TRANSFER OF CARE
"Anesthesia Transfer of Care Note    Patient: Chaya Tam    Procedure(s) Performed: Procedure(s) (LRB):  ARTHROSCOPY, KNEE (Left)  CARTILAGE-SHAVING (Left)  MENISCECTOMY, KNEE, MEDIAL (Left)    Patient location: PACU    Anesthesia Type: general    Transport from OR: Transported from OR on 2-3 L/min O2 by NC with adequate spontaneous ventilation    Post pain: adequate analgesia    Post assessment: no apparent anesthetic complications    Post vital signs: stable    Level of consciousness: awake and responds to stimulation    Nausea/Vomiting: no nausea/vomiting    Complications: none    Transfer of care protocol was followed      Last vitals:   Visit Vitals  /80 (BP Location: Right arm, Patient Position: Lying)   Pulse 92   Temp 36.5 °C (97.7 °F)   Resp 16   Ht 5' 6" (1.676 m)   Wt 68 kg (150 lb 0 oz)   SpO2 100%   Breastfeeding? No   BMI 24.21 kg/m²     "

## 2019-01-18 NOTE — BRIEF OP NOTE
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    Hospital Course (synopsis of major diagnoses, care, treatment, and services provided during the course of the hospital stay): The above patient underwent the above outpatient procedure. The patient tolerated procedure well and will be discharged today.--see orders.       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        CONTINUE taking these medications    NON FORMULARY MEDICATION  Take 1 capsule by mouth nightly. ESTRADIOL 2mg /  "PROGESTERONE 300mg / TESTOSTERONE 5mg COMPOUNDED CAPSULE.     valACYclovir 1000 MG tablet  Commonly known as:  VALTREX  TK 1 T PO BID PRN     vitamin D 1000 units Tab  Commonly known as:  VITAMIN D3  Take 1,000 Units by mouth once daily.     VYVANSE 40 MG Cap  Generic drug:  lisdexamfetamine  40 mg once daily.          Discharge Procedure Orders   CRUTCHES FOR HOME USE     Order Specific Question Answer Comments   Type: Axillary    Height: 5' 6" (1.676 m)    Weight: 68 kg (150 lb 0 oz)    Length of need (1-99 months): 99      Diet general     Passive ROM, SLR, Quad sets     Activity as tolerated     Sponge bath only until clinic visit     Weight bearing restrictions (specify)   Order Comments: Weight Bearing as tolerated using crutches as needed.     No driving, operating heavy equipment or signing legal documents while taking pain medication     Call MD for:  temperature >100.4     Call MD for:  persistent nausea and vomiting     Call MD for:  severe uncontrolled pain     Call MD for:  difficulty breathing, headache or visual disturbances     Call MD for:  redness, tenderness, or signs of infection (pain, swelling, redness, odor or green/yellow discharge around incision site)     Leave dressing on - Keep it clean, dry, and intact until clinic visit     Follow-up Information     Please follow up.    Why:  AS SCHEDULED , Call [phone] to reach Dr. Silveira               "

## 2019-01-18 NOTE — ANESTHESIA POSTPROCEDURE EVALUATION
"Anesthesia Post Evaluation    Patient: Chaya Tam    Procedure(s) Performed: Procedure(s) (LRB):  ARTHROSCOPY, KNEE (Left)  CARTILAGE-SHAVING (Left)  MENISCECTOMY, KNEE, MEDIAL (Left)    Final Anesthesia Type: general  Patient location during evaluation: PACU  Patient participation: Yes- Able to Participate  Level of consciousness: awake and alert  Post-procedure vital signs: reviewed and stable  Pain management: adequate  Airway patency: patent  PONV status at discharge: No PONV  Anesthetic complications: no      Cardiovascular status: blood pressure returned to baseline  Respiratory status: unassisted, spontaneous ventilation and room air  Hydration status: euvolemic  Follow-up not needed.        Visit Vitals  /89   Pulse 78   Temp 36.8 °C (98.3 °F) (Oral)   Resp 16   Ht 5' 6" (1.676 m)   Wt 68 kg (150 lb 0 oz)   SpO2 100%   Breastfeeding? No   BMI 24.21 kg/m²       Pain/Jose Roberto Score: Pain Rating Prior to Med Admin: 3 (1/18/2019  7:40 AM)  Jose Roberto Score: 10 (1/18/2019  8:19 AM)        "

## 2019-01-18 NOTE — ANESTHESIA POSTPROCEDURE EVALUATION
"Anesthesia Post Evaluation    Patient: Chaya Tam    Procedure(s) Performed: Procedure(s) (LRB):  ARTHROSCOPY, KNEE (Left)  CARTILAGE-SHAVING (Left)  MENISCECTOMY, KNEE, MEDIAL (Left)    Final Anesthesia Type: general  Patient location during evaluation: PACU  Patient participation: Yes- Able to Participate  Level of consciousness: awake and alert  Post-procedure vital signs: reviewed and stable  Pain management: adequate  Airway patency: patent  PONV status at discharge: No PONV  Anesthetic complications: no      Cardiovascular status: hemodynamically stable  Respiratory status: unassisted  Hydration status: euvolemic  Follow-up not needed.        Visit Vitals  /84   Pulse 68   Temp 36.8 °C (98.3 °F) (Oral)   Resp 16   Ht 5' 6" (1.676 m)   Wt 68 kg (150 lb 0 oz)   SpO2 100%   Breastfeeding? No   BMI 24.21 kg/m²       Pain/Jose Roberto Score: Pain Rating Prior to Med Admin: 3 (1/18/2019  7:40 AM)  Jose Roberto Score: 10 (1/18/2019  8:55 AM)        "

## 2019-01-18 NOTE — OP NOTE
DATE OF PROCEDURE: 01/18/2019     CHIEF COMPLAINT AND PRESENT ILLNESS:   53-year-old with recurring pain and swelling left knee been going on for many months.  MRI revealed meniscal tear.  Because of the persistent symptoms patient elected for left knee arthroscopy fully understanding significant risks benefits especially that progressive arthritis     PREOPERATIVE DIAGNOSIS:  Left knee medial meniscal tear     POSTOPERATIVE DIAGNOSIS:   same     PROCEDURES PERFORMED:   left knee arthroscopy partial medial meniscectomy    SURGEON:  Jacek Silveira M.D.     ASSISTANT:  Kisha Garcia CST.     COMPLICATIONS:   none     ANESTHESIA:  General     BLOOD LOSS:   8     IMPLANTS:  None     PROCEDURE IN DETAIL:   patient was brought to the operating room underwent general anesthesia without difficulty.  Exam under anesthesia showed trace to 1+ effusion full range of motion good stability she was appropriately positioned tourniquet applied 250 mm mercury after Esmarch.  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  Medial gutter clear medial joint line had a complex posterior half medial meniscal tear.  Had undersurface tear that was flipped underneath as well as vertical horizontal splits.  With the baskets and shaver the remaining meniscus was stable.  She had grade 3 chondromalacia changes at the posterior aspect of the medial femoral condyle.  She had a 3 mm full-thickness lesion at the posterior medial femoral condyle.  The tibial plateau was less involved.  The notch was clear anterior cruciate ligament clear lateral joint line clear no chondromalacia lateral meniscus good shape.  Two more look around the joint.  A thorough lavage performed. 0.5% ropivacaine was instilled in the soft tissues she was placed in bulky  sterile dressing and tourniquet released at 8 min tolerated procedure well brought to recovery in stable fashion

## 2019-01-18 NOTE — INTERVAL H&P NOTE
The patient has been examined and the H&P has been reviewed:    I concur with the findings and no changes have occurred since H&P was written.    Anesthesia/Surgery risks, benefits and alternative options discussed and understood by patient/family.          Active Hospital Problems    Diagnosis  POA    *MMT (medial meniscus tear) [S86.267A]  Yes      Resolved Hospital Problems   No resolved problems to display.

## 2019-01-18 NOTE — PLAN OF CARE
Chaya Tam has met all discharge criteria from Phase II. Vital Signs are stable, ambulating  without difficulty. Discharge instructions given, patient verbalized understanding. Discharged from facility via wheelchair in stable condition.

## 2019-01-18 NOTE — OR NURSING
Pt continues without c/o pain at this time. Prepared for transfer to acu. No change from previous assessment.

## 2019-01-18 NOTE — DISCHARGE INSTRUCTIONS

## 2019-10-25 ENCOUNTER — PATIENT MESSAGE (OUTPATIENT)
Dept: OBSTETRICS AND GYNECOLOGY | Facility: CLINIC | Age: 54
End: 2019-10-25

## 2019-10-25 DIAGNOSIS — Z12.31 VISIT FOR SCREENING MAMMOGRAM: Primary | ICD-10-CM

## 2020-03-05 ENCOUNTER — HOSPITAL ENCOUNTER (OUTPATIENT)
Dept: RADIOLOGY | Facility: HOSPITAL | Age: 55
Discharge: HOME | End: 2020-03-05
Attending: OBSTETRICS & GYNECOLOGY
Payer: COMMERCIAL

## 2020-03-05 VITALS — BODY MASS INDEX: 24.21 KG/M2 | WEIGHT: 150 LBS

## 2020-03-05 DIAGNOSIS — Z12.31 VISIT FOR SCREENING MAMMOGRAM: ICD-10-CM

## 2020-03-05 PROCEDURE — 77067 SCR MAMMO BI INCL CAD: CPT | Mod: 26,,, | Performed by: RADIOLOGY

## 2020-03-05 PROCEDURE — 77067 MAMMO DIGITAL SCREENING BILAT WITH TOMOSYNTHESIS_CAD: ICD-10-PCS | Mod: 26,,, | Performed by: RADIOLOGY

## 2020-03-05 PROCEDURE — 77067 SCR MAMMO BI INCL CAD: CPT | Mod: TC

## 2020-03-05 PROCEDURE — 77063 BREAST TOMOSYNTHESIS BI: CPT | Mod: 26,,, | Performed by: RADIOLOGY

## 2020-03-05 PROCEDURE — 77063 MAMMO DIGITAL SCREENING BILAT WITH TOMOSYNTHESIS_CAD: ICD-10-PCS | Mod: 26,,, | Performed by: RADIOLOGY

## 2020-03-06 ENCOUNTER — TELEPHONE (OUTPATIENT)
Dept: OBSTETRICS AND GYNECOLOGY | Facility: CLINIC | Age: 55
End: 2020-03-06

## 2020-03-06 NOTE — TELEPHONE ENCOUNTER
----- Message from Heath Christopher sent at 3/5/2020  4:55 PM CST -----  Contact: Self      Name of Who is Calling: YVAN JOSEPH [[id number]]      What is the request in detail: Pt would like to schedule an appt.Please contact to further discuss and advise.        Can the clinic reply by [facility]: Y      What Number to [facility] Back [facility]: [phone]

## 2020-03-09 ENCOUNTER — TELEPHONE (OUTPATIENT)
Dept: RADIOLOGY | Facility: HOSPITAL | Age: 55
End: 2020-03-09

## 2020-03-09 NOTE — TELEPHONE ENCOUNTER
Spoke with patient and explained mammogram findings.Patient expressed understanding of results. Patient scheduled abnormal mammogram follow up appointment at [facility] [place] on 3/10/2020.

## 2020-03-10 ENCOUNTER — HOSPITAL ENCOUNTER (OUTPATIENT)
Dept: RADIOLOGY | Facility: HOSPITAL | Age: 55
Discharge: HOME | End: 2020-03-10
Attending: OBSTETRICS & GYNECOLOGY
Payer: COMMERCIAL

## 2020-03-10 ENCOUNTER — PATIENT MESSAGE (OUTPATIENT)
Dept: OBSTETRICS AND GYNECOLOGY | Facility: CLINIC | Age: 55
End: 2020-03-10

## 2020-03-10 DIAGNOSIS — R92.8 ABNORMAL MAMMOGRAM: ICD-10-CM

## 2020-03-10 PROCEDURE — 77061 MAMMO DIGITAL DIAGNOSTIC LEFT WITH TOMOSYNTHESIS_CAD: ICD-10-PCS | Mod: 26,LT,, | Performed by: RADIOLOGY

## 2020-03-10 PROCEDURE — 76642 US BREAST LEFT LIMITED: ICD-10-PCS | Mod: 26,LT,, | Performed by: RADIOLOGY

## 2020-03-10 PROCEDURE — 76642 ULTRASOUND BREAST LIMITED: CPT | Mod: 26,LT,, | Performed by: RADIOLOGY

## 2020-03-10 PROCEDURE — 77065 DX MAMMO INCL CAD UNI: CPT | Mod: TC,PO,LT

## 2020-03-10 PROCEDURE — 76642 ULTRASOUND BREAST LIMITED: CPT | Mod: TC,PO,LT

## 2020-03-10 PROCEDURE — 77065 MAMMO DIGITAL DIAGNOSTIC LEFT WITH TOMOSYNTHESIS_CAD: ICD-10-PCS | Mod: 26,LT,, | Performed by: RADIOLOGY

## 2020-03-10 PROCEDURE — 77065 DX MAMMO INCL CAD UNI: CPT | Mod: 26,LT,, | Performed by: RADIOLOGY

## 2020-03-10 PROCEDURE — 77061 BREAST TOMOSYNTHESIS UNI: CPT | Mod: 26,LT,, | Performed by: RADIOLOGY

## 2020-04-21 ENCOUNTER — PATIENT MESSAGE (OUTPATIENT)
Dept: OBSTETRICS AND GYNECOLOGY | Facility: CLINIC | Age: 55
End: 2020-04-21

## 2020-04-23 ENCOUNTER — OFFICE VISIT (OUTPATIENT)
Dept: OBSTETRICS AND GYNECOLOGY | Facility: CLINIC | Age: 55
End: 2020-04-23
Attending: OBSTETRICS & GYNECOLOGY
Payer: COMMERCIAL

## 2020-04-23 DIAGNOSIS — Z78.0 MENOPAUSE: Primary | ICD-10-CM

## 2020-04-23 PROCEDURE — 99213 PR OFFICE/OUTPT VISIT, EST, LEVL III, 20-29 MIN: ICD-10-PCS | Mod: 95,,, | Performed by: OBSTETRICS & GYNECOLOGY

## 2020-04-23 PROCEDURE — 99213 OFFICE O/P EST LOW 20 MIN: CPT | Mod: 95,,, | Performed by: OBSTETRICS & GYNECOLOGY

## 2020-04-23 NOTE — PROGRESS NOTES
"  The patient location is:[place]  The chief complaint leading to consultation is: menopause  Visit type: audiovisual  Total time spent with patient: 15  Each patient to whom he or she provides medical services by telemedicine is:  (1) informed of the relationship between the physician and patient and the respective role of any other health care provider with respect to management of the patient; and (2) notified that he or she may decline to receive medical services by telemedicine and may withdraw from such care at any time.    Notes: see below          Subjective:       Patient ID: Chaya Tam is a 55 y.o. female.    Chief Complaint:  No chief complaint on file.      History of Present Illness  HPI  This. 55 yr old female is on telemed today to discuss her HRT and to get refills.  I last saw her in .  She had call back for screening mammogram in  with diagnostic mammogram and ultrasound of left breast but benign with routine follow up in one yr.  Her last pap was negative in  and she is due for WWE and pap.  She had abnormal pap yrs ago with some "cautery" with normal paps since.  She had neg EMBx in 2018 for post menopausal bleeding on HRT.  She is currently on compounded E/P/T  2mg/300mg/5mg and doing well. She is due for labs.    She has no complaints, I told her about my move and will send to Dr Parada.  She wants to continue the current meds and she understands she will need at least yearly labs.   Will get comprehensive labs now and will follow up with Dr Parada this summer for wwe.    She sees a NP at her work for medical check ups.  She had knee surgery 2 yrs ago and had DEXA scan and her orthopedist put her on fosamax.      GYN & OB History  No LMP recorded. Patient is postmenopausal.   Date of Last Pap: 2017    OB History    Para Term  AB Living   3 2 2   1 2   SAB TAB Ectopic Multiple Live Births   1              # Outcome Date GA Lbr Benson/2nd Weight Sex Delivery " Anes PTL Lv   3 Term            2 Term            1 SAB                Review of Systems  Review of Systems   Constitutional: Negative for chills and fever.   Respiratory: Negative for shortness of breath.    Cardiovascular: Negative for chest pain.   Gastrointestinal: Negative for abdominal pain, nausea and vomiting.   Genitourinary: Negative for difficulty urinating, dyspareunia, genital sores, menstrual problem, pelvic pain, vaginal bleeding, vaginal discharge and vaginal pain.   Skin: Negative for wound.   Hematological: Negative for adenopathy.           Objective:   Physical Exam       Assessment:        1. Menopause               Plan:      Will get labs today  Continue current meds as long as labs ok and she needs appt with internal med  Return to see Dr Parada for WWE and pap

## 2020-07-22 ENCOUNTER — TELEPHONE (OUTPATIENT)
Dept: ENDOSCOPY | Facility: HOSPITAL | Age: 55
End: 2020-07-22

## 2020-07-22 DIAGNOSIS — Z01.812 PRE-PROCEDURE LAB EXAM: Primary | ICD-10-CM

## 2020-07-22 DIAGNOSIS — Z86.010 HX OF COLONIC POLYPS: Primary | ICD-10-CM

## 2020-07-22 DIAGNOSIS — Z12.11 SPECIAL SCREENING FOR MALIGNANT NEOPLASMS, COLON: Primary | ICD-10-CM

## 2020-07-22 RX ORDER — DESVENLAFAXINE SUCCINATE 50 MG/1
50 TABLET, EXTENDED RELEASE ORAL DAILY
COMMUNITY
End: 2022-08-25

## 2020-07-22 RX ORDER — SODIUM, POTASSIUM,MAG SULFATES 17.5-3.13G
1 SOLUTION, RECONSTITUTED, ORAL ORAL DAILY
Qty: 1 KIT | Refills: 0 | Status: SHIPPED | OUTPATIENT
Start: 2020-07-22 | End: 2020-07-24

## 2020-07-24 DIAGNOSIS — N95.1 MENOPAUSAL SYMPTOM: Primary | ICD-10-CM

## 2020-07-27 ENCOUNTER — TELEPHONE (OUTPATIENT)
Dept: OBSTETRICS AND GYNECOLOGY | Facility: CLINIC | Age: 55
End: 2020-07-27

## 2020-07-27 ENCOUNTER — LAB VISIT (OUTPATIENT)
Dept: SURGERY | Facility: CLINIC | Age: 55
End: 2020-07-27
Payer: COMMERCIAL

## 2020-07-27 DIAGNOSIS — Z01.812 PRE-PROCEDURE LAB EXAM: ICD-10-CM

## 2020-07-27 PROCEDURE — U0003 INFECTIOUS AGENT DETECTION BY NUCLEIC ACID (DNA OR RNA); SEVERE ACUTE RESPIRATORY SYNDROME CORONAVIRUS 2 (SARS-COV-2) (CORONAVIRUS DISEASE [COVID-19]), AMPLIFIED PROBE TECHNIQUE, MAKING USE OF HIGH THROUGHPUT TECHNOLOGIES AS DESCRIBED BY CMS-2020-01-R: HCPCS

## 2020-07-27 NOTE — TELEPHONE ENCOUNTER
----- Message from Ariadna Parada MD sent at 7/24/2020  6:41 PM CDT -----  Former patient of Dr. Reynolds. Needs to have Estradiol, Progesterone and Free TEst done. Orders placed.   ----- Message -----  From: Doris Vega LPN  Sent: 7/23/2020  11:12 AM CDT  To: Ariadna Parada MD    Former pt of Dr Parada requesting lab requisitions for hormone labs to be done at [facility] to be drawn prior to her appt. Pt has an appt on 08/18/2020.  ----- Message -----  From: Brittnee Levy MA  Sent: 7/23/2020   9:31 AM CDT  To: Karma Wing    Good morning,    This patient has appointment with Dr. Parada in August and is requesting lab orders. Would it be possible for Dr. Parada to place lab orders in for her?   ----- Message -----  From: Victor M Agrawal  Sent: 7/23/2020   9:21 AM CDT  To: [facility] Staff    BRITTNEE,THIS IS A PT OF DR REYNOLDS SHE NEEDS COPY OF HER LABS SO SHE CAN HAVE IT DONE @ [facility] PT CAN BE REACH@ [phone]

## 2020-07-28 LAB — SARS-COV-2 RNA RESP QL NAA+PROBE: NOT DETECTED

## 2020-07-30 ENCOUNTER — HOSPITAL ENCOUNTER (OUTPATIENT)
Facility: HOSPITAL | Age: 55
Discharge: HOME | End: 2020-07-30
Attending: INTERNAL MEDICINE | Admitting: INTERNAL MEDICINE
Payer: COMMERCIAL

## 2020-07-30 ENCOUNTER — ANESTHESIA (OUTPATIENT)
Dept: ENDOSCOPY | Facility: HOSPITAL | Age: 55
End: 2020-07-30
Payer: COMMERCIAL

## 2020-07-30 ENCOUNTER — ANESTHESIA EVENT (OUTPATIENT)
Dept: ENDOSCOPY | Facility: HOSPITAL | Age: 55
End: 2020-07-30
Payer: COMMERCIAL

## 2020-07-30 VITALS
WEIGHT: 144 LBS | BODY MASS INDEX: 23.14 KG/M2 | HEART RATE: 64 BPM | HEIGHT: 66 IN | OXYGEN SATURATION: 100 % | DIASTOLIC BLOOD PRESSURE: 76 MMHG | SYSTOLIC BLOOD PRESSURE: 121 MMHG | RESPIRATION RATE: 18 BRPM | TEMPERATURE: 98 F

## 2020-07-30 DIAGNOSIS — Z86.010 HISTORY OF COLON POLYPS: Primary | ICD-10-CM

## 2020-07-30 PROBLEM — Z86.0100 HISTORY OF COLON POLYPS: Status: ACTIVE | Noted: 2020-07-30

## 2020-07-30 PROCEDURE — 37000008 HC ANESTHESIA 1ST 15 MINUTES: Performed by: INTERNAL MEDICINE

## 2020-07-30 PROCEDURE — E9220 PRA ENDO ANESTHESIA: HCPCS | Mod: 33,,, | Performed by: NURSE ANESTHETIST, CERTIFIED REGISTERED

## 2020-07-30 PROCEDURE — 45385 COLONOSCOPY W/LESION REMOVAL: CPT | Mod: 33,,, | Performed by: INTERNAL MEDICINE

## 2020-07-30 PROCEDURE — 63600175 PHARM REV CODE 636 W HCPCS: Performed by: NURSE ANESTHETIST, CERTIFIED REGISTERED

## 2020-07-30 PROCEDURE — 45385 PR COLONOSCOPY,REMV LESN,SNARE: ICD-10-PCS | Mod: 33,,, | Performed by: INTERNAL MEDICINE

## 2020-07-30 PROCEDURE — 25000003 PHARM REV CODE 250: Performed by: INTERNAL MEDICINE

## 2020-07-30 PROCEDURE — 27201089 HC SNARE, DISP (ANY): Performed by: INTERNAL MEDICINE

## 2020-07-30 PROCEDURE — 25000003 PHARM REV CODE 250: Performed by: ORTHOPAEDIC SURGERY

## 2020-07-30 PROCEDURE — 45385 COLONOSCOPY W/LESION REMOVAL: CPT | Performed by: INTERNAL MEDICINE

## 2020-07-30 PROCEDURE — 88305 TISSUE EXAM BY PATHOLOGIST: CPT | Performed by: PATHOLOGY

## 2020-07-30 PROCEDURE — 88305 TISSUE EXAM BY PATHOLOGIST: CPT | Mod: 26,,, | Performed by: PATHOLOGY

## 2020-07-30 PROCEDURE — E9220 PRA ENDO ANESTHESIA: ICD-10-PCS | Mod: 33,,, | Performed by: NURSE ANESTHETIST, CERTIFIED REGISTERED

## 2020-07-30 PROCEDURE — 88305 TISSUE EXAM BY PATHOLOGIST: ICD-10-PCS | Mod: 26,,, | Performed by: PATHOLOGY

## 2020-07-30 PROCEDURE — 37000009 HC ANESTHESIA EA ADD 15 MINS: Performed by: INTERNAL MEDICINE

## 2020-07-30 RX ORDER — LIDOCAINE HCL/PF 100 MG/5ML
SYRINGE (ML) INTRAVENOUS
Status: DISCONTINUED | OUTPATIENT
Start: 2020-07-30 | End: 2020-07-30

## 2020-07-30 RX ORDER — PROPOFOL 10 MG/ML
VIAL (ML) INTRAVENOUS
Status: DISCONTINUED | OUTPATIENT
Start: 2020-07-30 | End: 2020-07-30

## 2020-07-30 RX ORDER — PROPOFOL 10 MG/ML
VIAL (ML) INTRAVENOUS CONTINUOUS PRN
Status: DISCONTINUED | OUTPATIENT
Start: 2020-07-30 | End: 2020-07-30

## 2020-07-30 RX ORDER — SODIUM CHLORIDE 9 MG/ML
INJECTION, SOLUTION INTRAVENOUS CONTINUOUS
Status: DISCONTINUED | OUTPATIENT
Start: 2020-07-30 | End: 2020-07-30 | Stop reason: HOSPADM

## 2020-07-30 RX ADMIN — Medication 100 MG: at 08:07

## 2020-07-30 RX ADMIN — SODIUM CHLORIDE: 0.9 INJECTION, SOLUTION INTRAVENOUS at 08:07

## 2020-07-30 RX ADMIN — PROPOFOL 80 MG: 10 INJECTION, EMULSION INTRAVENOUS at 08:07

## 2020-07-30 RX ADMIN — PROPOFOL 200 MCG/KG/MIN: 10 INJECTION, EMULSION INTRAVENOUS at 08:07

## 2020-07-30 NOTE — ANESTHESIA PREPROCEDURE EVALUATION
07/30/2020  Chaya Tam is a 55 y.o., female.    Past Medical History:   Diagnosis Date    Kidney stones      Patient Active Problem List   Diagnosis    Palpitations     Past Surgical History:   Procedure Laterality Date    ARTHROSCOPY OF KNEE Left 1/18/2019    Procedure: ARTHROSCOPY, KNEE;  Surgeon: Jacek Silveira MD;  Location: [facility];  Service: Orthopedics;  Laterality: Left;    DILATION AND CURETTAGE OF UTERUS      EXCISION OF MEDIAL MENISCUS OF KNEE Left 1/18/2019    Procedure: MENISCECTOMY, KNEE, MEDIAL;  Surgeon: Jacek Silveira MD;  Location: [facility];  Service: Orthopedics;  Laterality: Left;    kidney stones           Anesthesia Evaluation    I have reviewed the Patient Summary Reports.      I have reviewed the Medications.     Review of Systems  Anesthesia Hx:  No problems with previous Anesthesia   Denies Personal Hx of Anesthesia complications.   Social:  Non-Smoker, Social Alcohol Use    Hematology/Oncology:  Hematology Normal   Oncology Normal     EENT/Dental:EENT/Dental Normal   Cardiovascular:  Cardiovascular Normal     Pulmonary:  Pulmonary Normal    Renal/:   Chronic Renal Disease    Hepatic/GI:  Hepatic/GI Normal Bowel Prep.    Musculoskeletal:  Musculoskeletal Normal    Neurological:  Neurology Normal    Endocrine:  Endocrine Normal    Dermatological:  Skin Normal    Psych:  Psychiatric Normal           Physical Exam  General:  Well nourished    Airway/Jaw/Neck:  Airway Findings: Mouth Opening: Normal Tongue: Normal  General Airway Assessment: Adult  Mallampati: I  Improves to I with phonation.  TM Distance: Normal, at least 6 cm  Jaw/Neck Findings:  Neck ROM: Normal ROM     Eyes/Ears/Nose:  Eyes/Ears/Nose Findings:    Dental:  Dental Findings: In tact   Chest/Lungs:  Chest/Lungs Clear    Heart/Vascular:  Heart Findings: Normal Heart murmur: negative Vascular Findings:  Normal    Abdomen:  Abdomen Findings: Normal    Musculoskeletal:  Musculoskeletal Findings: Normal   Skin:  Skin Findings: Normal    Mental Status:  Mental Status Findings: Normal        Anesthesia Plan  Type of Anesthesia, risks & benefits discussed:  Anesthesia Type:  general  Patient's Preference:   Intra-op Monitoring Plan: standard ASA monitors  Intra-op Monitoring Plan Comments:   Post Op Pain Control Plan:   Post Op Pain Control Plan Comments:   Induction:   IV  Beta Blocker:  Patient is not currently on a Beta-Blocker (No further documentation required).       Informed Consent: Patient understands risks and agrees with Anesthesia plan.  Questions answered. Anesthesia consent signed with patient.  ASA Score: 2     Day of Surgery Review of History & Physical:    H&P update referred to the surgeon.         Ready For Surgery From Anesthesia Perspective.

## 2020-07-30 NOTE — ANESTHESIA POSTPROCEDURE EVALUATION
Anesthesia Post Evaluation    Patient: Chaya Tam    Procedure(s) Performed: Procedure(s) (LRB):  COLONOSCOPY (N/A)    Final Anesthesia Type: general    Patient location during evaluation: PACU  Patient participation: Yes- Able to Participate  Level of consciousness: awake and alert and oriented  Post-procedure vital signs: reviewed and stable  Pain management: adequate  Airway patency: patent    PONV status at discharge: No PONV  Anesthetic complications: no      Cardiovascular status: hemodynamically stable  Respiratory status: unassisted  Hydration status: euvolemic  Follow-up not needed.          Vitals Value Taken Time   /76 07/30/20 0940   Temp 36.7 °C (98 °F) 07/30/20 0912   Pulse 64 07/30/20 0940   Resp 18 07/30/20 0940   SpO2 100 % 07/30/20 0940         Event Time   Out of Recovery 09:43:49         Pain/Jose Roberto Score: Jose Roberto Score: 10 (7/30/2020  9:27 AM)

## 2020-07-30 NOTE — H&P
Short Stay Endoscopy History and Physical    PCP - Primary Doctor No    Procedure - Colonoscopy  Sedation: GA  ASA - per anesthesia  Mallampati - per anesthesia  History of Anesthesia problems - no  Family history Anesthesia problems -  no     HPI:  This is a 55 y.o. female here for evaluation of : History of colon polyps    Reflux - no  Dysphagia - no  Abdominal pain - no  Diarrhea - no    ROS:  Constitutional: No fevers, chills, No weight loss  ENT: No allergies  CV: No chest pain  Pulm: No cough, No shortness of breath  Ophtho: No vision changes  GI: see HPI  Medical History:  has a past medical history of Kidney stones.    Surgical History:  has a past surgical history that includes Dilation and curettage of uterus; kidney stones; Arthroscopy of knee (Left, 1/18/2019); and Excision of medial meniscus of knee (Left, 1/18/2019).    Family History: family history includes Breast cancer in her sister; Stroke in her mother.. Otherwise no colon cancer, inflammatory bowel disease, or GI malignancies.    Social History:  reports that she has never smoked. She has never used smokeless tobacco. She reports current alcohol use of about 6.0 standard drinks of alcohol per week. She reports that she does not use drugs.    Review of patient's allergies indicates:   Allergen Reactions    Bactrim [sulfamethoxazole-trimethoprim] Rash       Medications:   Medications Prior to Admission   Medication Sig Dispense Refill Last Dose    desvenlafaxine succinate (PRISTIQ) 50 MG Tb24 Take 50 mg by mouth once daily.       HYDROcodone-acetaminophen (NORCO) 5-325 mg per tablet Take 1 tablet by mouth every 6 (six) hours as needed for Pain. 30 tablet 0     NON FORMULARY MEDICATION Take 1 capsule by mouth nightly. ESTRADIOL 2mg / PROGESTERONE 300mg / TESTOSTERONE 5mg COMPOUNDED CAPSULE.       UNABLE TO FIND Take 1 capsule by mouth every evening. Estradiol/progesterone/testosterone 2mg/300mg/5mg  Take one capsule nightly  Disp #90 with  1refils 30 capsule 11     valACYclovir (VALTREX) 1000 MG tablet TK 1 T PO BID PRN  3     vitamin D (VITAMIN D3) 1000 units Tab Take 1,000 Units by mouth once daily.       VYVANSE 40 mg Cap 40 mg once daily.          Objective Findings:    Vital Signs: Per nursing notes.    Physical Exam:  General Appearance: Well appearing in no acute distress  Head:   Normocephalic, without obvious abnormality  Eyes:    No scleral icterus  Airway: Open  Neck: No restriction in mobility  Lungs: CTA bilaterally in anterior and posterior fields, no wheezes, no crackles.  Heart:  Regular rate and rhythm, S1, S2 normal, no murmurs heard  Abdomen: Soft, non tender, non distended      Labs:  No results found for: WBC, HGB, HCT, PLT, CHOL, TRIG, HDL, LDLDIRECT, ALT, AST, NA, K, CL, CREATININE, BUN, CO2, TSH, PSA, INR, GLUF, HGBA1C, MICROALBUR      I have explained the risks and benefits of endoscopy procedures to the patient including but not limited to bleeding, perforation, infection, and death.    Thank you so much for allowing me to participate in the care of Chaya Leigh MD

## 2020-07-30 NOTE — TRANSFER OF CARE
"Anesthesia Transfer of Care Note    Patient: Chaya Tam    Procedure(s) Performed: Procedure(s) (LRB):  COLONOSCOPY (N/A)    Patient location: GI    Anesthesia Type: general    Transport from OR: Transported from OR on room air with adequate spontaneous ventilation    Post pain: adequate analgesia    Post assessment: no apparent anesthetic complications and tolerated procedure well    Post vital signs: stable    Level of consciousness: sedated    Nausea/Vomiting: no nausea/vomiting    Complications: none    Transfer of care protocol was followed      Last vitals:   Visit Vitals  BP (!) 146/89 (BP Location: Left arm, Patient Position: Lying)   Pulse 75   Temp 36.7 °C (98.1 °F) (Temporal)   Resp 16   Ht 5' 6" (1.676 m)   Wt 65.3 kg (144 lb)   SpO2 96%   BMI 23.24 kg/m²     "

## 2020-07-30 NOTE — DISCHARGE INSTRUCTIONS
Colonoscopy     A camera attached to a flexible tube with a viewing lens is used to take video pictures.     Colonoscopy is a test to view the inside of your lower digestive tract (colon and rectum). Sometimes it can show the last part of the small intestine (ileum). During the test, small pieces of tissue may be removed for testing. This is called a biopsy. Small growths, such as polyps, may also be removed.   Why is colonoscopy done?  The test is done to help look for colon cancer. And it can help find the source of abdominal pain, bleeding, and changes in bowel habits. It may be needed once a year, depending on factors such as your:  · Age  · Health history  · Family health history  · Symptoms  · Results from any prior colonoscopy  Risks and possible complications  These include:  · Bleeding               · A puncture or tear in the colon   · Risks of anesthesia  · A cancer lesion not being seen  Getting ready   To prepare for the test:  · Talk with your healthcare provider about the risks of the test (see below). Also ask your healthcare provider about alternatives to the test.  · Tell your healthcare provider about any medicines you take. Also tell him or her about any health conditions you may have.  · Make sure your rectum and colon are empty for the test. Follow the diet and bowel prep instructions exactly. If you dont, the test may need to be rescheduled.  · Plan for a friend or family member to drive you home after the test.     Colonoscopy provides an inside view of the entire colon.     You may discuss the results with your doctor right away or at a future visit.  During the test   The test is usually done in the hospital on an outpatient basis. This means you go home the same day. The procedure takes about 30 minutes. During that time:  · You are given relaxing (sedating) medicine through an IV line. You may be drowsy, or fully asleep.  · The healthcare provider will first give you a physical exam to  check for anal and rectal problems.  · Then the anus is lubricated and the scope inserted.  · If you are awake, you may have a feeling similar to needing to have a bowel movement. You may also feel pressure as air is pumped into the colon. Its OK to pass gas during the procedure.  · Biopsy, polyp removal, or other treatments may be done during the test.  After the test   You may have gas right after the test. It can help to try to pass it to help prevent later bloating. Your healthcare provider may discuss the results with you right away. Or you may need to schedule a follow-up visit to talk about the results. After the test, you can go back to your normal eating and other activities. You may be tired from the sedation and need to rest for a few hours.  Date Last Reviewed: 11/1/2016 © 2000-2017 [employer]. [address] [place] [address], [place] [address]. All rights reserved. This information is not intended as a substitute for professional medical care. Always follow your healthcare professional's instructions.

## 2020-07-30 NOTE — [employer] PATIENT INSTRUCTIONS
Discharge Summary/Instructions after an Endoscopic Procedure  Patient Name: Chaya Tam  Patient MRN: [id number]  Patient YOB: 1965  Thursday, July 30, 2020  Eddie Leigh MD  RESTRICTIONS:  During your procedure today, you received medications for sedation.  These   medications may affect your judgment, balance and coordination.  Therefore,   for 24 hours, you have the following restrictions:   - DO NOT drive a car, operate machinery, make legal/financial decisions,   sign important papers or drink alcohol.    ACTIVITY:  Today: no heavy lifting, straining or running due to procedural   sedation/anesthesia.  The following day: return to full activity including work.  DIET:  Eat and drink normally unless instructed otherwise.     TREATMENT FOR COMMON SIDE EFFECTS:  - Mild abdominal pain, nausea, belching, bloating or excessive gas:  rest,   eat lightly and use a heating pad.  - Sore Throat: treat with throat lozenges and/or gargle with warm salt   water.  - Because air was used during the procedure, expelling large amounts of air   from your rectum or belching is normal.  - If a bowel prep was taken, you may not have a bowel movement for 1-3 days.    This is normal.  SYMPTOMS TO WATCH FOR AND REPORT TO YOUR PHYSICIAN:  1. Abdominal pain or bloating, other than gas cramps.  2. Chest pain.  3. Back pain.  4. Signs of infection such as: chills or fever occurring within 24 hours   after the procedure.  5. Rectal bleeding, which would show as bright red, maroon, or black stools.   (A tablespoon of blood from the rectum is not serious, especially if   hemorrhoids are present.)  6. Vomiting.  7. Weakness or dizziness.  GO DIRECTLY TO THE NEAREST EMERGENCY ROOM IF YOU HAVE ANY OF THE FOLLOWING:      Difficulty breathing              Chills and/or fever over 101 F   Persistent vomiting and/or vomiting blood   Severe abdominal pain   Severe chest pain   Black, tarry stools   Bleeding- more than one tablespoon   Any  other symptom or condition that you feel may need urgent attention  Your doctor recommends these additional instructions:  If any biopsies were taken, your doctors clinic will contact you in 1 to 2   weeks with any results.  - Patient has a contact number available for emergencies.  The signs and   symptoms of potential delayed complications were discussed with the   patient.  Return to normal activities tomorrow.  Written discharge   instructions were provided to the patient.   - Discharge patient to home.   - Resume previous diet.   - Continue present medications.   - Await pathology results.   - Repeat colonoscopy in 5 years for surveillance.   For questions, problems or results please call your physician - Eddie Leigh MD at Work:  [phone].  [facility] [place], EMERGENCY ROOM PHONE NUMBER: [phone]  IF A COMPLICATION OR EMERGENCY SITUATION ARISES AND YOU ARE UNABLE TO REACH   YOUR PHYSICIAN - GO DIRECTLY TO THE EMERGENCY ROOM.  Eddie Leigh MD  7/30/2020 9:07:51 AM  This report has been verified and signed electronically.  [employer]

## 2020-08-03 LAB
FINAL PATHOLOGIC DIAGNOSIS: NORMAL
GROSS: NORMAL

## 2020-08-04 ENCOUNTER — TELEPHONE (OUTPATIENT)
Dept: GASTROENTEROLOGY | Facility: CLINIC | Age: 55
End: 2020-08-04

## 2020-08-04 NOTE — TELEPHONE ENCOUNTER
Spoke with patient , results given as written by Dr. Leigh  Pt verbalizes understadning and appreciates the call.  Thanks MA

## 2020-08-04 NOTE — TELEPHONE ENCOUNTER
----- Message from Matidla Brar sent at 8/4/2020  2:10 PM CDT -----  Contact: pt  Pt returning call to clinic re results           Please contact pt [phone]     Problem: Adult Inpatient Plan of Care  Goal: Plan of Care Review  Outcome: Ongoing (interventions implemented as appropriate)  Pt remained free from fall or injury throughout the shift.  VSS and afebrile overnight.  LLE wound was cleaned and dressing was changed per MD order. Pictures were taken for the chart.  L arm PIV in place and patent w/ IV abx given as ordered.  Pain was only mildly controlled with PRN PO pain medications and a 1x dose of liquid PO morphine.  Pt is OOB and ambulating independently tot he BR to void.  x1 BM during shift. Pt is c/o itching to his LLE and continue to pull at, rub and scratch at his wound and dressing. PO Benadryl was given q6 with mild relief of itching.  Bed alarm in place and pt is utilizing the call bell to call for assistance when getting OOB.  Bedside rails are raised x2 with call bell and bedside table within reach.  Nurse rounded hourly until midnight and then q2 hours to ensure pt safety.

## 2020-08-04 NOTE — TELEPHONE ENCOUNTER
----- Message from Eddie Leigh MD sent at 8/4/2020  1:58 PM CDT -----  Please notify patient, the colon polyp was benign.

## 2020-08-18 ENCOUNTER — OFFICE VISIT (OUTPATIENT)
Dept: OBSTETRICS AND GYNECOLOGY | Facility: CLINIC | Age: 55
End: 2020-08-18
Attending: OBSTETRICS & GYNECOLOGY
Payer: COMMERCIAL

## 2020-08-18 ENCOUNTER — TELEPHONE (OUTPATIENT)
Dept: OBSTETRICS AND GYNECOLOGY | Facility: CLINIC | Age: 55
End: 2020-08-18

## 2020-08-18 VITALS
SYSTOLIC BLOOD PRESSURE: 122 MMHG | WEIGHT: 148.13 LBS | BODY MASS INDEX: 23.81 KG/M2 | HEIGHT: 66 IN | DIASTOLIC BLOOD PRESSURE: 74 MMHG

## 2020-08-18 DIAGNOSIS — Z01.419 ENCOUNTER FOR GYNECOLOGICAL EXAMINATION WITHOUT ABNORMAL FINDING: Primary | ICD-10-CM

## 2020-08-18 DIAGNOSIS — Z79.890 POSTMENOPAUSAL HORMONE REPLACEMENT THERAPY: ICD-10-CM

## 2020-08-18 DIAGNOSIS — Z12.31 SCREENING MAMMOGRAM, ENCOUNTER FOR: ICD-10-CM

## 2020-08-18 DIAGNOSIS — Z12.4 PAP SMEAR FOR CERVICAL CANCER SCREENING: ICD-10-CM

## 2020-08-18 PROCEDURE — 99999 PR PBB SHADOW E&M-EST. PATIENT-LVL III: CPT | Mod: PBBFAC,,, | Performed by: OBSTETRICS & GYNECOLOGY

## 2020-08-18 PROCEDURE — 99396 PREV VISIT EST AGE 40-64: CPT | Mod: S$GLB,,, | Performed by: OBSTETRICS & GYNECOLOGY

## 2020-08-18 PROCEDURE — 99999 PR PBB SHADOW E&M-EST. PATIENT-LVL III: ICD-10-PCS | Mod: PBBFAC,,, | Performed by: OBSTETRICS & GYNECOLOGY

## 2020-08-18 PROCEDURE — 87624 HPV HI-RISK TYP POOLED RSLT: CPT

## 2020-08-18 PROCEDURE — 3008F PR BODY MASS INDEX (BMI) DOCUMENTED: ICD-10-PCS | Mod: CPTII,S$GLB,, | Performed by: OBSTETRICS & GYNECOLOGY

## 2020-08-18 PROCEDURE — 88142 CYTOPATH C/V THIN LAYER: CPT

## 2020-08-18 PROCEDURE — 3008F BODY MASS INDEX DOCD: CPT | Mod: CPTII,S$GLB,, | Performed by: OBSTETRICS & GYNECOLOGY

## 2020-08-18 PROCEDURE — 99396 PR PREVENTIVE VISIT,EST,40-64: ICD-10-PCS | Mod: S$GLB,,, | Performed by: OBSTETRICS & GYNECOLOGY

## 2020-08-18 RX ORDER — LISDEXAMFETAMINE DIMESYLATE 30 MG/1
CAPSULE ORAL
COMMUNITY
Start: 2020-06-22

## 2020-08-18 RX ORDER — MULTIVITAMIN WITH IRON
TABLET ORAL
COMMUNITY
Start: 2017-10-03

## 2020-08-18 RX ORDER — ERGOCALCIFEROL 1.25 MG/1
CAPSULE ORAL
COMMUNITY
Start: 2020-07-16 | End: 2022-08-25

## 2020-08-18 RX ORDER — PROGESTERONE 200 MG/1
200 CAPSULE ORAL NIGHTLY
Qty: 90 CAPSULE | Refills: 3 | Status: SHIPPED | OUTPATIENT
Start: 2020-08-18 | End: 2021-10-20 | Stop reason: SDUPTHER

## 2020-08-18 RX ORDER — ALENDRONATE SODIUM 70 MG/1
TABLET ORAL
COMMUNITY
Start: 2019-10-14 | End: 2021-11-10 | Stop reason: SDUPTHER

## 2020-08-18 RX ORDER — TRAZODONE HYDROCHLORIDE 50 MG/1
TABLET ORAL
COMMUNITY
Start: 2020-03-13

## 2020-08-18 NOTE — PROGRESS NOTES
Subjective:       Patient ID: Chaya Tam is a 55 y.o. female.    Chief Complaint:  Well Woman      History of Present Illness  HPI    Chaya Tam is a 55 y.o. female  new to me (patient of Dr. Reynolds) here for her annual GYN exam.  She has been on bio identical compounded hormones for several years and complains of worsening of darkening of skin exposed skin in a chloasma type pattern on face and arms. Her menopausal symptoms are adequately relieved with HRT.  She is recently  from her spouse of 32 years, and now in a relationship with a same sex partner.     Currently on compounded E/P/T of 2/300/5 orally.  Labs done at [facility]:  Total Cholest: 202  HDL: 68  Trigl: 104  Free TEst: 1.5  Estradiol: 16    She describes her periods as stopped with menopause   denies break through bleeding.   denies vaginal itching or irritation.  Denies vaginal discharge.  She is sexually active.      History of abnormal pap: Yes - years ago , had cautery of abnl cells  Last Pap: approximate date  and was normal  Last MMG: normal--routine follow-up in 12 months  Last Colonoscopy:  colonoscopy a few monthsago without abnormalities.  denies domestic violence. She does feel safe at home.     Past Medical History:   Diagnosis Date    Kidney stones      Past Surgical History:   Procedure Laterality Date    ARTHROSCOPY OF KNEE Left 2019    Procedure: ARTHROSCOPY, KNEE;  Surgeon: Jacek Silveira MD;  Location: [facility];  Service: Orthopedics;  Laterality: Left;    COLONOSCOPY N/A 2020    Procedure: COLONOSCOPY;  Surgeon: Eddie Leigh MD;  Location: [facility] ([address] [place] [address]);  Service: Endoscopy;  Laterality: N/A;  covid-20-[facility]-2nd floor-BB  instructions emailed to patient-BB    DILATION AND CURETTAGE OF UTERUS      EXCISION OF MEDIAL MENISCUS OF KNEE Left 2019    Procedure: MENISCECTOMY, KNEE, MEDIAL;  Surgeon: Jacek Silveira MD;  Location: [facility];  Service: Orthopedics;  Laterality:  "Left;    kidney stones       Social History     Socioeconomic History    Marital status:      Spouse name: Not on file    Number of children: Not on file    Years of education: Not on file    Highest education level: Not on file   Occupational History    Not on file   Social Needs    Financial resource strain: Not on file    Food insecurity     Worry: Not on file     Inability: Not on file    Transportation needs     Medical: Not on file     Non-medical: Not on file   Tobacco Use    Smoking status: Never Smoker    Smokeless tobacco: Never Used   Substance and Sexual Activity    Alcohol use: Yes     Alcohol/week: 6.0 standard drinks     Types: 3 Glasses of wine, 3 Cans of beer per week     Comment: socially    Drug use: No    Sexual activity: Yes     Partners: Male, Female     Birth control/protection: None     Comment: ; together x 32 years; recently    Lifestyle    Physical activity     Days per week: Not on file     Minutes per session: Not on file    Stress: Not on file   Relationships    Social connections     Talks on phone: Not on file     Gets together: Not on file     Attends Latter day service: Not on file     Active member of club or organization: Not on file     Attends meetings of clubs or organizations: Not on file     Relationship status: Not on file   Other Topics Concern    Not on file   Social History Narrative    Not on file     Family History   Problem Relation Age of Onset    Stroke Mother 76    Hypertension Father     Breast cancer Sister 48    Heart attack Neg Hx     Heart disease Neg Hx     Colon cancer Neg Hx     Ovarian cancer Neg Hx     Diabetes Neg Hx      OB History        3    Para   2    Term   2            AB   1    Living   2       SAB   1    TAB        Ectopic        Multiple        Live Births   2                 /74   Ht 5' 6" (1.676 m)   Wt 67.2 kg (148 lb 2.4 oz)   LMP 2010 (Approximate)   BMI 23.91 " kg/m²         GYN & OB History  Patient's last menstrual period was 2010 (approximate).   Date of Last Pap: 2017    OB History    Para Term  AB Living   3 2 2   1 2   SAB TAB Ectopic Multiple Live Births   1       2      # Outcome Date GA Lbr Benson/2nd Weight Sex Delivery Anes PTL Lv   3 Term      Vag-Spont   LEONORA   2 Term      Vag-Spont   LEONORA   1 SAB                Review of Systems  Review of Systems   Constitutional: Negative for activity change, appetite change, fatigue and unexpected weight change.   HENT: Negative.    Eyes: Negative for visual disturbance.   Respiratory: Negative for shortness of breath and wheezing.    Cardiovascular: Negative for chest pain, palpitations and leg swelling.   Gastrointestinal: Negative for abdominal pain, bloating and blood in stool.   Endocrine: Negative for diabetes, hair loss and hot flashes.   Genitourinary: Negative for decreased libido, dyspareunia, hot flashes, postmenopausal bleeding and vaginal dryness.   Musculoskeletal: Negative for back pain and joint swelling.   Integumentary:  Negative for acne, hair changes and nipple discharge.   Neurological: Negative for headaches.   Hematological: Does not bruise/bleed easily.   Psychiatric/Behavioral: Negative for depression and sleep disturbance. The patient is not nervous/anxious.    Breast: Negative for mastodynia and nipple discharge          Objective:      Physical Exam:   Constitutional: She is oriented to person, place, and time. She appears well-developed and well-nourished.    HENT:   Head: Normocephalic and atraumatic.    Eyes: Pupils are equal, round, and reactive to light. EOM are normal.    Neck: Normal range of motion. Neck supple.    Cardiovascular: Normal rate and regular rhythm.     Pulmonary/Chest: Effort normal and breath sounds normal.   BREASTS:  no mass, no tenderness, no deformity and no retraction. Right breast exhibits no inverted nipple, no mass, no nipple discharge, no skin  change, no tenderness, no bleeding and no swelling. Left breast exhibits no inverted nipple, no mass, no nipple discharge, no skin change, no tenderness, no bleeding and no swelling. Breasts are symmetrical.              Abdominal: Soft. Bowel sounds are normal.     Genitourinary:    Pelvic exam was performed with patient supine.      Genitourinary Comments: PELVIC: Normal external genitalia without lesions.  Normal hair distribution.  Adequate perineal body, normal urethral meatus.  Vagina  Dry and poorly rugated, atrophic, without lesions or discharge.  Cervix pink, without lesions, discharge or tenderness.  No significant cystocele or rectocele.  Bimanual exam shows uterus to be normal size, regular, mobile and nontender.  Adnexa without masses or tenderness.    RECTAL:Deferred               Musculoskeletal: Normal range of motion and moves all extremeties.       Neurological: She is alert and oriented to person, place, and time.    Skin: Skin is warm and dry.    Psychiatric: She has a normal mood and affect.              Assessment:        1. Encounter for gynecological examination without abnormal finding    2. Pap smear for cervical cancer screening    3. Screening mammogram, encounter for    4. Postmenopausal hormone replacement therapy                Plan:        1. Encounter for gynecological examination without abnormal finding  COUNSELING:  The patient was counseled today on regular weight bearing exercise. Patient was counseled today on the new ACS guidelines for cervical cytology screening as well as the current recommendations for breast cancer screening. Counseling session lasted approximately 10 minutes, and all her questions were answered. She was advised to see her primary care physician for all other health maintenance.   FOLLOW-UP with me for next routine visit.         2. Pap smear for cervical cancer screening      - Liquid-Based Pap Smear, Screening  - HPV High Risk Genotypes, PCR    3.  Screening mammogram, encounter for      - Mammo Digital Screening Bilat w/ Oswaldo; Future    4. Postmenopausal hormone replacement therapy   UNABLE TO FIND; Apply 0.5 g topically every evening. Estradiol 2mg/g: Testosterone 1%(50mg/5g)  (1mg estradiol/ 5mg Test) nightly(1/2 gram nightly or two clicks nightly)  Dispense: 30 g; Refill: 5  - progesterone (PROMETRIUM) 200 MG capsule; Take 1 capsule (200 mg total) by mouth nightly.  Dispense: 90 capsule; Refill: 3    Long discussion with patient regarding different forms of HRT. We discussed r/b/a. I do not see any health reason why she cannot continue with her HRT. There are many proven benefits to HRT including decreased risk for osteoporosis, colon cancer. Decreased risk for cardiovascular disease and dementia. We also discussed HRT and breast cancer. Breast cancer is very common, affecting 1/8 women regardless of HRT status. Any medicine is a R/B assessment and since she is feeling so much better on HRT she needs to weigh for herself what she wants to do. She can discontinue HRT therapy, however, this does NOT significantly decrease her risk of developing breast cancer. There is still marked controversy over the actual role of HRT in breast cancer risk.  We did discuss that transdermal option of estrogen is safer than oral estradiol as transdermal methods decrease risk for blood clots and stroke as they bypass liver metabolism.  Further, there is recent data to suggest that discontinuing HT is harmful, and that there is an increased risk of cardiac and stroke mortality within the first year of discontinuing HT, especially in women who initiated HT at less than 60 years of age.(This is thought to be related to decreased production of nitric oxide, resulting in vasoconstriction, exposure to activated inflammatory processes that have implications for acute events resulting from rupture of underlying susceptible atherosclerotic placque, and direct myocardial effects that  may predispose to fatal arrythmias through calcium channel dysregulation.) Other organ systems also show rapid deterioration upon withdrawal of HT, as shown in the skeletal system where withdrawal of HT results in rapid bone loss of bone mineral density, and an increased risk of bone fracture, including hip fractures that are associated with significant mortality.     Follow up in about 1 year (around 8/18/2021), or if symptoms worsen or fail to improve.

## 2020-08-18 NOTE — TELEPHONE ENCOUNTER
----- Message from Luz Evans sent at 8/18/2020  8:40 AM CDT -----  Contact: YVAN JOSEPH [[id number]]  Type: Patient Call Back    Who called:YVAN JOSEPH BAKARI [[id number]]    What is the request in detail: Patient is requesting a call back. She states that she went to the wrong location and she would like to know if she can still be seen today. I advised her to reschedule. She states that she is still coming to the office to see if she can be seen.   Please advise.    Can the clinic reply by [facility]? No    Best call back number: [phone]    Additional Information: N/A

## 2020-08-27 LAB
HPV HR 12 DNA SPEC QL NAA+PROBE: NEGATIVE
HPV16 AG SPEC QL: NEGATIVE
HPV18 DNA SPEC QL NAA+PROBE: NEGATIVE

## 2020-09-01 LAB
FINAL PATHOLOGIC DIAGNOSIS: NORMAL
Lab: NORMAL

## 2020-12-13 ENCOUNTER — OFFICE VISIT (OUTPATIENT)
Dept: URGENT CARE | Facility: CLINIC | Age: 55
End: 2020-12-13
Payer: COMMERCIAL

## 2020-12-13 VITALS
SYSTOLIC BLOOD PRESSURE: 142 MMHG | DIASTOLIC BLOOD PRESSURE: 88 MMHG | RESPIRATION RATE: 16 BRPM | WEIGHT: 146 LBS | TEMPERATURE: 99 F | HEIGHT: 66 IN | HEART RATE: 85 BPM | BODY MASS INDEX: 23.46 KG/M2 | OXYGEN SATURATION: 97 %

## 2020-12-13 DIAGNOSIS — R09.82 PND (POST-NASAL DRIP): ICD-10-CM

## 2020-12-13 DIAGNOSIS — Z11.52 ENCOUNTER FOR SCREENING LABORATORY TESTING FOR COVID-19 VIRUS: ICD-10-CM

## 2020-12-13 DIAGNOSIS — J00 ACUTE NASOPHARYNGITIS: Primary | ICD-10-CM

## 2020-12-13 DIAGNOSIS — H65.93 MIDDLE EAR EFFUSION, BILATERAL: ICD-10-CM

## 2020-12-13 LAB
CTP QC/QA: YES
SARS-COV-2 RDRP RESP QL NAA+PROBE: NEGATIVE

## 2020-12-13 PROCEDURE — U0002: ICD-10-PCS | Mod: QW,S$GLB,, | Performed by: PHYSICIAN ASSISTANT

## 2020-12-13 PROCEDURE — 99203 PR OFFICE/OUTPT VISIT, NEW, LEVL III, 30-44 MIN: ICD-10-PCS | Mod: S$GLB,,, | Performed by: PHYSICIAN ASSISTANT

## 2020-12-13 PROCEDURE — 3008F BODY MASS INDEX DOCD: CPT | Mod: CPTII,S$GLB,, | Performed by: PHYSICIAN ASSISTANT

## 2020-12-13 PROCEDURE — 3008F PR BODY MASS INDEX (BMI) DOCUMENTED: ICD-10-PCS | Mod: CPTII,S$GLB,, | Performed by: PHYSICIAN ASSISTANT

## 2020-12-13 PROCEDURE — 99203 OFFICE O/P NEW LOW 30 MIN: CPT | Mod: S$GLB,,, | Performed by: PHYSICIAN ASSISTANT

## 2020-12-13 PROCEDURE — U0002 COVID-19 LAB TEST NON-CDC: HCPCS | Mod: QW,S$GLB,, | Performed by: PHYSICIAN ASSISTANT

## 2020-12-13 NOTE — PATIENT INSTRUCTIONS
PLEASE READ YOUR DISCHARGE INSTRUCTIONS ENTIRELY AS IT CONTAINS IMPORTANT INFORMATION.    Patient had covid testing done today.      If Negative and no direct exposure: symptom free without fever reducing meds in 24 hours - can go back to work in 24 hours with surgical mask for 14 days.      Discussed corona virus precautions and reviewed CDC FAC; printed a copy for patient.  I discussed to continue to monitor their symptoms. Discussed that if their symptoms persist or worsen to seek re-evaluation. Clinic vs. ER precautions were given.  Patient verbalized understanding and agreed with the above plan of care.    - Rest.  Drink plenty of fluids.    - Tylenol as directed as needed for fever/pain.  For Tylenol, do not exceed 4000 mg/ day. If no contraindication.      -Below are suggestions for symptomatic relief:              -Salt water gargles to soothe throat pain.              -Chloroseptic spray also helps to numb throat pain.              -Nasal saline spray reduces inflammation and dryness.              -Warm face compresses to help with facial sinus pain/pressure.              -Vicks vapor rub at night.                 -Flonase OTC or Nasacort OTC for nasal congestion.              -Simple foods like chicken noodle soup.              -Mucinex for cough during the day time. Delsym helps with coughing at night. Mucinex-D if you have chest congestion or sputum (caution if history of high blood pressure or palpitations).              -Zyrtec/Claritin during the day & Benadryl at night may help with allergies.  -If you DO NOT have Hypertension or any history of palpitations, it is ok to take over the counter Sudafed or Mucinex D or Allegra-D or Claritin-D or Zyrtec-D.  -If you do take one of the above, it is ok to combine that with plain over the counter Mucinex or Allegra or Claritin or Zyrtec. If, for example, you are taking Zyrtec -D, you can combine that with Mucinex, but not Mucinex-D.  If you are taking  Mucinex-D, you can combine that with plain Allegra or Claritin or Zyrtec.   -If you DO have Hypertension or palpitations, it is safe to take Coricidin HBP for relief of sinus symptoms.      For your GI symptoms:  -Use gatorade/pedialyte or rehydration packets to help stay hydrated. Vitamin water and plain water do not contain rehydrating electrolytes.  -Increase clear liquids (water, gatorade, pedialyte, broths, jello, etc) Hold off on solids for 12-18 hours. Then advance to BRAT diet (banana, rice, applesauce, tea, toast/crackers), then advance further as tolerated. Avoid spicy or fatty foods.   -Use Peptobismol or Immodium to help alleviate your diarrhea symptoms.   -Avoid imodium unless you have more than 6 loose stools in 24 hours.   -Wash hands frequently while sick. Avoid ibuprofen or other NSAIDS until you are well.   -Please go to the ER if you experience worsening pain, blood in your vomit or stool, high fever, dizziness, fainting, swelling of your abdomen, inability to pass gas or stool.         -You must understand that you've received an Urgent Care treatment only and that you may be released before all your medical problems are known or treated. You, the patient, will arrange for follow up care as instructed. Please arrange follow up with your primary medical clinic within 2-5 days if your signs and symptoms have not resolved or worsen.   - Follow up with your PCP or specialty clinic as directed.  You can call [phone] or [phone] to schedule an appointment with the appropriate provider.    - If your condition worsens or fails to improve we recommend that you receive another evaluation at the emergency room immediately or contact your primary medical clinic to discuss your concerns.              Prevention steps for patients with confirmed or suspected COVID-19   Stay home and stay away from family members and friends. The CDC says, you can leave home after these three things have happened:  1) You have had no fever for at least 24 hours (that is one full day of no fever without the use of medicine that reduces fevers) 2) AND other symptoms have improved (for example, when your cough or shortness of breath have improved) 3) AND at least 10 days have passed since your symptoms first appeared OR after 10 days passed from positive test.   Separate yourself from other people and animals in your home.   Call ahead before visiting your doctor.   Wear a facemask.   Cover your coughs and sneezes.   Wash your hands often with soap and water; hand  can be used, too.   Avoid sharing personal household items.   Wipe down surfaces used daily.   Monitor your symptoms. Seek prompt medical attention if your illness is worsening (e.g., difficulty breathing).    Before seeking care, call your healthcare provider.   If you have a medical emergency and need to call 911, notify the dispatch personnel that you have, or are being evaluated for COVID-19. If possible, put on a facemask before emergency medical services arrive.        Recommended precautions for household members, intimate partners, and caregivers in a home setting of a patient with symptomatic laboratory-confirmed COVID-19 or a patient under investigation.  Household members, intimate partners, and caregivers in the home setting awaiting tests results have close contact with a person with symptomatic, laboratory-confirmed COVID-19 or a person under investigation. Close contacts should monitor their health; they should call their provider right away if they develop symptoms suggestive of COVID-19 (e.g., fever, cough, shortness of breath).    Close contacts should also follow these recommendations:   Make sure that you understand and can help the patient follow their provider's instructions for medication(s) and care. You should help the patient with basic needs in the home and provide support for getting groceries, prescriptions, and other  personal needs.   Monitor the patient's symptoms. If the patient is getting sicker, call his or her healthcare provider and tell them that the patient has laboratory-confirmed COVID-19. If the patient has a medical emergency and you need to call 911, notify the dispatch personnel that the patient has, or is being evaluated for COVID-19.   Household members should stay in another room or be  from the patient. Household members should use a separate bedroom and bathroom, if available.   Prohibit visitors.   Household members should care for any pets in the home.   Make sure that shared spaces in the home have good air flow, such as by an air conditioner or an opened window, weather permitting.   Perform hand hygiene frequently. Wash your hands often with soap and water for at least 20 seconds or use an alcohol-based hand  (that contains > 60% alcohol) covering all surfaces of your hands and rubbing them together until they feel dry. Soap and water should be used preferentially.   Avoid touching your eyes, nose, and mouth.   The patient should wear a facemask. If the patient is not able to wear a facemask (for example, because it causes trouble breathing), caregivers should wear a mask when they are in the same room as the patient.   Wear a disposable facemask and gloves when you touch or have contact with the patient's blood, stool, or body fluids, such as saliva, sputum, nasal mucus, vomit, urine.  o Throw out disposable facemasks and gloves after using them. Do not reuse.  o When removing personal protective equipment, first remove and dispose of gloves. Then, immediately clean your hands with soap and water or alcohol-based hand . Next, remove and dispose of facemask, and immediately clean your hands again with soap and water or alcohol-based hand .   You should not share dishes, drinking glasses, cups, eating utensils, towels, bedding, or other items with the patient.  After the patient uses these items, you should wash them thoroughly (see below Wash laundry thoroughly).   Clean all high-touch surfaces, such as counters, tabletops, doorknobs, bathroom fixtures, toilets, phones, keyboards, tablets, and bedside tables, every day. Also, clean any surfaces that may have blood, stool, or body fluids on them.   Use a household cleaning spray or wipe, according to the label instructions. Labels contain instructions for safe and effective use of the cleaning product including precautions you should take when applying the product, such as wearing gloves and making sure you have good ventilation during use of the product.   Wash laundry thoroughly.  o Immediately remove and wash clothes or bedding that have blood, stool, or body fluids on them.  o Wear disposable gloves while handling soiled items and keep soiled items away from your body. Clean your hands (with soap and water or an alcohol-based hand ) immediately after removing your gloves.  o Read and follow directions on labels of laundry or clothing items and detergent. In general, using a normal laundry detergent according to washing machine instructions and dry thoroughly using the warmest temperatures recommended on the clothing label.   Place all used disposable gloves, facemasks, and other contaminated items in a lined container before disposing of them with other household waste. Clean your hands (with soap and water or an alcohol-based hand ) immediately after handling these items. Soap and water should be used preferentially if hands are visibly dirty.   Discuss any additional questions with your state or local health department or healthcare provider. Check available hours when contacting your local health department.    For more information see CDC link below.      https://www.cdc.gov/coronavirus/2019-ncov/hcp/guidance-prevent-spread.html#precautions        Sources:  [facility], [facility]  Health and Hospitals          Instructions for Home Care of Patients and Caretakers with Coronavirus Disease 2019   Limit visitors to the home.  Older persons and those that have chronic medical conditions such as diabetes, lung and heart disease are at increased risk for illness.    If possible, patients should use a separate bedroom while recovering. Caregivers and household members should avoid prolonged contact with the patient which means to stay 6 feet away and avoid contact with cough droplets.  When close contact is necessary, wash your hands before and immediately after contact.    Perform hand hygiene frequently. Wash your hands often with soap and water for at least 20 seconds or use an alcohol-based hand , covering all surfaces of your hands and rubbing them together until they feel dry.    Avoid touching your eyes, nose, and mouth with unwashed hands.   Avoid sharing household items with the patient. You should not share dishes, drinking glasses, cups, eating utensils, towels, bedding, or other items. After the patient uses these items, you should wash them thoroughly.   Wash laundry thoroughly.   o Immediately remove and wash clothes or bedding that have blood, stool, or body fluids on them.   Clean all high-touch surfaces, such as counters, tabletops, doorknobs, bathroom fixtures, toilets, phones, keyboards, tablets, and bedside tables, every day.   o Use a household cleaning spray or wipe, according to the label instructions. Labels contain instructions for safe and effective use of the cleaning product including precautions you should take when applying the product, such as wearing gloves and making sure you have good ventilation during use of the product.    For more information see CDC link below.      https://www.cdc.gov/coronavirus/2019-ncov/hcp/guidance-prevent-spread.html#precautions               If your symptoms worsen or if you have any other concerns, please contact  [facility] at [phone].

## 2020-12-13 NOTE — PROGRESS NOTES
"Subjective:       Patient ID: Chaya Tam is a 55 y.o. female.    Vitals:  height is 5' 6" (1.676 m) and weight is 66.2 kg (146 lb). Her tympanic temperature is 98.9 °F (37.2 °C). Her blood pressure is 142/88 (abnormal) and her pulse is 85. Her respiration is 16 and oxygen saturation is 97%.     Chief Complaint: Nasal Congestion, Headache, and COVID-19 Concerns        55-year-old female with a history of ADHD, anxiety/depression, and osteoporosis who presents to urgent care clinic for evaluation. Pt c/o head congestion, postnasal drip, nasal/sinus pressure and ear congestion since last night.  Started Mucinex with moderate relief.  No Associated symptoms.  No direct COVID-19 exposure.      Constitution: Negative for activity change, appetite change, chills, sweating, fatigue, fever and generalized weakness.   HENT: Positive for congestion, postnasal drip and sinus pressure. Negative for ear pain, hearing loss, facial swelling, sinus pain, trouble swallowing and voice change.         Positive for ear congestion   Neck: Negative for neck pain, neck stiffness and painful lymph nodes.   Cardiovascular: Negative for chest pain, leg swelling, palpitations, sob on exertion and passing out.   Eyes: Negative for eye discharge, eye pain, photophobia, vision loss, double vision and blurred vision.   Respiratory: Negative for chest tightness, cough, sputum production, bloody sputum, COPD, shortness of breath, stridor, wheezing and asthma.    Gastrointestinal: Negative for abdominal pain, nausea, vomiting, constipation, diarrhea, bright red blood in stool, rectal bleeding, heartburn and bowel incontinence.   Genitourinary: Negative for dysuria, frequency, urgency, urine decreased, flank pain, bladder incontinence, hematuria and history of kidney stones.   Musculoskeletal: Negative for trauma, joint pain, joint swelling, abnormal ROM of joint, muscle cramps and muscle ache.   Skin: Negative for color change, pale, rash, wound " and bruising.   Allergic/Immunologic: Negative for seasonal allergies, asthma and immunocompromised state.   Neurological: Negative for dizziness, history of vertigo, light-headedness, passing out, facial drooping, speech difficulty, coordination disturbances, loss of balance, headaches, disorientation, altered mental status, loss of consciousness, numbness, tingling and seizures.   Hematologic/Lymphatic: Negative for swollen lymph nodes, easy bruising/bleeding and trouble clotting. Does not bruise/bleed easily.   Psychiatric/Behavioral: Negative for altered mental status, disorientation, nervous/anxious, sleep disturbance and depression. The patient is not nervous/anxious.        Objective:      Physical Exam   Constitutional: She is oriented to person, place, and time. She appears well-developed. She is cooperative.  Non-toxic appearance. She does not appear ill. No distress.   HENT:   Head: Normocephalic and atraumatic.   Ears:   Right Ear: Hearing, external ear and ear canal normal. No drainage, swelling or tenderness. No mastoid tenderness. Tympanic membrane is not erythematous and not bulging. A middle ear effusion is present.   Left Ear: Hearing, external ear and ear canal normal. No drainage, swelling or tenderness. No mastoid tenderness. Tympanic membrane is not erythematous and not bulging. A middle ear effusion is present.   Nose: Nose normal. No rhinorrhea, purulent discharge or congestion. Right sinus exhibits no maxillary sinus tenderness and no frontal sinus tenderness. Left sinus exhibits no maxillary sinus tenderness and no frontal sinus tenderness.   Mouth/Throat: Uvula is midline and mucous membranes are normal. Mucous membranes are moist. No oral lesions. No trismus in the jaw. No uvula swelling. Posterior oropharyngeal erythema present. No oropharyngeal exudate, posterior oropharyngeal edema, tonsillar abscesses or cobblestoning. Tonsils are 0 on the left. No tonsillar exudate. Oropharynx is  clear.   Eyes: Pupils are equal, round, and reactive to light. Conjunctivae, EOM and lids are normal. Right eye exhibits no discharge. Left eye exhibits no discharge. No visual field deficit is present. Right conjunctiva is not injected. Right conjunctiva has no hemorrhage. Left conjunctiva is not injected. Left conjunctiva has no hemorrhage. extraocular movement intactvision grossly intactgaze aligned appropriately  Neck: Normal range of motion and full passive range of motion without pain. Neck supple. No neck rigidity.   Cardiovascular: Normal rate, regular rhythm, normal heart sounds and normal pulses.   Pulmonary/Chest: Effort normal and breath sounds normal. No accessory muscle usage or stridor. No respiratory distress. She has no wheezes. She exhibits no tenderness.   Abdominal: Soft. Normal appearance. She exhibits no distension and no mass. There is no splenomegaly or hepatomegaly. There is no abdominal tenderness. There is no rebound, no guarding, no tenderness at McBurney's point, negative Jackson's sign, no left CVA tenderness, negative Rovsing's sign and no right CVA tenderness.   Musculoskeletal: Normal range of motion.      Right lower leg: No edema.      Left lower leg: No edema.      Comments: Moves all extremities with normal tone, strength, and ROM.  Gait normal.   Lymphadenopathy:     She has no cervical adenopathy.   Neurological: She is alert and oriented to person, place, and time. She has normal motor skills, normal sensation and intact cranial nerves. She displays no weakness, facial symmetry and normal reflexes. No cranial nerve deficit or sensory deficit. She exhibits normal muscle tone. She has a normal Finger-Nose-Finger Test. She shows no pronator drift. She displays no seizure activity. Gait and coordination normal. Coordination normal. GCS eye subscore is 4. GCS verbal subscore is 5. GCS motor subscore is 6.   Skin: Skin is warm, dry, not diaphoretic and no rash. Capillary refill takes  less than 2 seconds. Psychiatric: Her speech is normal and behavior is normal. Mood and thought content normal.   Nursing note and vitals reviewed.       Results for orders placed or performed in visit on 12/13/20   POCT COVID-19 Rapid Screening   Result Value Ref Range    POC Rapid COVID Negative Negative     Acceptable Yes            Assessment:       1. Acute nasopharyngitis    2. PND (post-nasal drip)    3. Middle ear effusion, bilateral    4. Encounter for screening laboratory testing for COVID-19 virus        Nontoxic appearing. Vitals are stable. Patient presents for COVID nasal swab testing. Patient is concerned for possible exposure. Rapid covid neg.  Patient has symptoms at this time which is consistent with viral syndrome.  All diagnostic testing personally reviewed and interpreted.       Patient was recommended OTC treatments for their symptoms.   Patient was counseled, explained with the test results meaning, expected COVID course, and answered all of questions. They can also receive results via my chart.  Printed and verbal COVID guidelines were given.     Patient understands that they received an Urgent Care treatment only and that they may be released before all your medical problems are known or treated. Strict ED versus clinic precautions given.  Patient verbalized understanding and agreed with plan of care.    Note dictated with voice recognition software, please excuse any grammatical errors.    Plan:         Acute nasopharyngitis    PND (post-nasal drip)  -     POCT COVID-19 Rapid Screening    Middle ear effusion, bilateral    Encounter for screening laboratory testing for COVID-19 virus           Patient Instructions     PLEASE READ YOUR DISCHARGE INSTRUCTIONS ENTIRELY AS IT CONTAINS IMPORTANT INFORMATION.    Patient had covid testing done today.      If Negative and no direct exposure: symptom free without fever reducing meds in 24 hours - can go back to work in 24 hours with  surgical mask for 14 days.      Discussed corona virus precautions and reviewed [facility]; printed a copy for patient.  I discussed to continue to monitor their symptoms. Discussed that if their symptoms persist or worsen to seek re-evaluation. Clinic vs. ER precautions were given.  Patient verbalized understanding and agreed with the above plan of care.    - Rest.  Drink plenty of fluids.    - Tylenol as directed as needed for fever/pain.  For Tylenol, do not exceed 4000 mg/ day. If no contraindication.      -Below are suggestions for symptomatic relief:              -Salt water gargles to soothe throat pain.              -Chloroseptic spray also helps to numb throat pain.              -Nasal saline spray reduces inflammation and dryness.              -Warm face compresses to help with facial sinus pain/pressure.              -Vicks vapor rub at night.                 -Flonase OTC or Nasacort OTC for nasal congestion.              -Simple foods like chicken noodle soup.              -Mucinex for cough during the day time. Delsym helps with coughing at night. Mucinex-D if you have chest congestion or sputum (caution if history of high blood pressure or palpitations).              -Zyrtec/Claritin during the day & Benadryl at night may help with allergies.  -If you DO NOT have Hypertension or any history of palpitations, it is ok to take over the counter Sudafed or Mucinex D or Allegra-D or Claritin-D or Zyrtec-D.  -If you do take one of the above, it is ok to combine that with plain over the counter Mucinex or Allegra or Claritin or Zyrtec. If, for example, you are taking Zyrtec -D, you can combine that with Mucinex, but not Mucinex-D.  If you are taking Mucinex-D, you can combine that with plain Allegra or Claritin or Zyrtec.   -If you DO have Hypertension or palpitations, it is safe to take Coricidin HBP for relief of sinus symptoms.      For your GI symptoms:  -Use gatorade/pedialyte or rehydration packets to help  stay hydrated. Vitamin water and plain water do not contain rehydrating electrolytes.  -Increase clear liquids (water, gatorade, pedialyte, broths, jello, etc) Hold off on solids for 12-18 hours. Then advance to BRAT diet (banana, rice, applesauce, tea, toast/crackers), then advance further as tolerated. Avoid spicy or fatty foods.   -Use Peptobismol or Immodium to help alleviate your diarrhea symptoms.   -Avoid imodium unless you have more than 6 loose stools in 24 hours.   -Wash hands frequently while sick. Avoid ibuprofen or other NSAIDS until you are well.   -Please go to the ER if you experience worsening pain, blood in your vomit or stool, high fever, dizziness, fainting, swelling of your abdomen, inability to pass gas or stool.         -You must understand that you've received an Urgent Care treatment only and that you may be released before all your medical problems are known or treated. You, the patient, will arrange for follow up care as instructed. Please arrange follow up with your primary medical clinic within 2-5 days if your signs and symptoms have not resolved or worsen.   - Follow up with your PCP or specialty clinic as directed.  You can call [phone] or [phone] to schedule an appointment with the appropriate provider.    - If your condition worsens or fails to improve we recommend that you receive another evaluation at the emergency room immediately or contact your primary medical clinic to discuss your concerns.              Prevention steps for patients with confirmed or suspected COVID-19   Stay home and stay away from family members and friends. The CDC says, you can leave home after these three things have happened: 1) You have had no fever for at least 24 hours (that is one full day of no fever without the use of medicine that reduces fevers) 2) AND other symptoms have improved (for example, when your cough or shortness of breath have improved) 3) AND at least 10 days have passed  since your symptoms first appeared OR after 10 days passed from positive test.   Separate yourself from other people and animals in your home.   Call ahead before visiting your doctor.   Wear a facemask.   Cover your coughs and sneezes.   Wash your hands often with soap and water; hand  can be used, too.   Avoid sharing personal household items.   Wipe down surfaces used daily.   Monitor your symptoms. Seek prompt medical attention if your illness is worsening (e.g., difficulty breathing).    Before seeking care, call your healthcare provider.   If you have a medical emergency and need to call 911, notify the dispatch personnel that you have, or are being evaluated for COVID-19. If possible, put on a facemask before emergency medical services arrive.        Recommended precautions for household members, intimate partners, and caregivers in a home setting of a patient with symptomatic laboratory-confirmed COVID-19 or a patient under investigation.  Household members, intimate partners, and caregivers in the home setting awaiting tests results have close contact with a person with symptomatic, laboratory-confirmed COVID-19 or a person under investigation. Close contacts should monitor their health; they should call their provider right away if they develop symptoms suggestive of COVID-19 (e.g., fever, cough, shortness of breath).    Close contacts should also follow these recommendations:   Make sure that you understand and can help the patient follow their provider's instructions for medication(s) and care. You should help the patient with basic needs in the home and provide support for getting groceries, prescriptions, and other personal needs.   Monitor the patient's symptoms. If the patient is getting sicker, call his or her healthcare provider and tell them that the patient has laboratory-confirmed COVID-19. If the patient has a medical emergency and you need to call 911, notify the dispatch  personnel that the patient has, or is being evaluated for COVID-19.   Household members should stay in another room or be  from the patient. Household members should use a separate bedroom and bathroom, if available.   Prohibit visitors.   Household members should care for any pets in the home.   Make sure that shared spaces in the home have good air flow, such as by an air conditioner or an opened window, weather permitting.   Perform hand hygiene frequently. Wash your hands often with soap and water for at least 20 seconds or use an alcohol-based hand  (that contains > 60% alcohol) covering all surfaces of your hands and rubbing them together until they feel dry. Soap and water should be used preferentially.   Avoid touching your eyes, nose, and mouth.   The patient should wear a facemask. If the patient is not able to wear a facemask (for example, because it causes trouble breathing), caregivers should wear a mask when they are in the same room as the patient.   Wear a disposable facemask and gloves when you touch or have contact with the patient's blood, stool, or body fluids, such as saliva, sputum, nasal mucus, vomit, urine.  o Throw out disposable facemasks and gloves after using them. Do not reuse.  o When removing personal protective equipment, first remove and dispose of gloves. Then, immediately clean your hands with soap and water or alcohol-based hand . Next, remove and dispose of facemask, and immediately clean your hands again with soap and water or alcohol-based hand .   You should not share dishes, drinking glasses, cups, eating utensils, towels, bedding, or other items with the patient. After the patient uses these items, you should wash them thoroughly (see below Wash laundry thoroughly).   Clean all high-touch surfaces, such as counters, tabletops, doorknobs, bathroom fixtures, toilets, phones, keyboards, tablets, and bedside tables, every day.  Also, clean any surfaces that may have blood, stool, or body fluids on them.   Use a household cleaning spray or wipe, according to the label instructions. Labels contain instructions for safe and effective use of the cleaning product including precautions you should take when applying the product, such as wearing gloves and making sure you have good ventilation during use of the product.   Wash laundry thoroughly.  o Immediately remove and wash clothes or bedding that have blood, stool, or body fluids on them.  o Wear disposable gloves while handling soiled items and keep soiled items away from your body. Clean your hands (with soap and water or an alcohol-based hand ) immediately after removing your gloves.  o Read and follow directions on labels of laundry or clothing items and detergent. In general, using a normal laundry detergent according to washing machine instructions and dry thoroughly using the warmest temperatures recommended on the clothing label.   Place all used disposable gloves, facemasks, and other contaminated items in a lined container before disposing of them with other household waste. Clean your hands (with soap and water or an alcohol-based hand ) immediately after handling these items. Soap and water should be used preferentially if hands are visibly dirty.   Discuss any additional questions with your state or local health department or healthcare provider. Check available hours when contacting your local health department.    For more information see CDC link below.      https://www.cdc.gov/coronavirus/2019-ncov/hcp/guidance-prevent-spread.html#precautions        Sources:  [facility], [facility]          Instructions for Home Care of Patients and Caretakers with Coronavirus Disease 2019   Limit visitors to the home.  Older persons and those that have chronic medical conditions such as diabetes, lung and heart disease are at increased risk  for illness.    If possible, patients should use a separate bedroom while recovering. Caregivers and household members should avoid prolonged contact with the patient which means to stay 6 feet away and avoid contact with cough droplets.  When close contact is necessary, wash your hands before and immediately after contact.    Perform hand hygiene frequently. Wash your hands often with soap and water for at least 20 seconds or use an alcohol-based hand , covering all surfaces of your hands and rubbing them together until they feel dry.    Avoid touching your eyes, nose, and mouth with unwashed hands.   Avoid sharing household items with the patient. You should not share dishes, drinking glasses, cups, eating utensils, towels, bedding, or other items. After the patient uses these items, you should wash them thoroughly.   Wash laundry thoroughly.   o Immediately remove and wash clothes or bedding that have blood, stool, or body fluids on them.   Clean all high-touch surfaces, such as counters, tabletops, doorknobs, bathroom fixtures, toilets, phones, keyboards, tablets, and bedside tables, every day.   o Use a household cleaning spray or wipe, according to the label instructions. Labels contain instructions for safe and effective use of the cleaning product including precautions you should take when applying the product, such as wearing gloves and making sure you have good ventilation during use of the product.    For more information see CDC link below.      https://www.cdc.gov/coronavirus/2019-ncov/hcp/guidance-prevent-spread.html#precautions               If your symptoms worsen or if you have any other concerns, please contact [facility] at [phone].

## 2021-03-15 ENCOUNTER — PATIENT MESSAGE (OUTPATIENT)
Dept: OBSTETRICS AND GYNECOLOGY | Facility: CLINIC | Age: 56
End: 2021-03-15

## 2021-04-15 ENCOUNTER — PATIENT MESSAGE (OUTPATIENT)
Dept: RESEARCH | Facility: HOSPITAL | Age: 56
End: 2021-04-15

## 2021-08-06 ENCOUNTER — OFFICE VISIT (OUTPATIENT)
Dept: URGENT CARE | Facility: CLINIC | Age: 56
End: 2021-08-06
Payer: COMMERCIAL

## 2021-08-06 VITALS
SYSTOLIC BLOOD PRESSURE: 145 MMHG | BODY MASS INDEX: 23.78 KG/M2 | RESPIRATION RATE: 16 BRPM | HEART RATE: 69 BPM | DIASTOLIC BLOOD PRESSURE: 93 MMHG | TEMPERATURE: 98 F | OXYGEN SATURATION: 98 % | HEIGHT: 66 IN | WEIGHT: 148 LBS

## 2021-08-06 DIAGNOSIS — S91.312A LACERATION OF LEFT HEEL WITHOUT COMPLICATION, INITIAL ENCOUNTER: Primary | ICD-10-CM

## 2021-08-06 DIAGNOSIS — M79.672 PAIN OF LEFT HEEL: ICD-10-CM

## 2021-08-06 DIAGNOSIS — L08.9 SKIN INFECTION: ICD-10-CM

## 2021-08-06 PROCEDURE — 99214 OFFICE O/P EST MOD 30 MIN: CPT | Mod: 25,S$GLB,, | Performed by: PHYSICIAN ASSISTANT

## 2021-08-06 PROCEDURE — 90471 IMMUNIZATION ADMIN: CPT | Mod: 59,S$GLB,, | Performed by: PHYSICIAN ASSISTANT

## 2021-08-06 PROCEDURE — 96372 PR INJECTION,THERAP/PROPH/DIAG2ST, IM OR SUBCUT: ICD-10-PCS | Mod: S$GLB,,, | Performed by: PHYSICIAN ASSISTANT

## 2021-08-06 PROCEDURE — 99214 PR OFFICE/OUTPT VISIT, EST, LEVL IV, 30-39 MIN: ICD-10-PCS | Mod: 25,S$GLB,, | Performed by: PHYSICIAN ASSISTANT

## 2021-08-06 PROCEDURE — 3080F DIAST BP >= 90 MM HG: CPT | Mod: CPTII,S$GLB,, | Performed by: PHYSICIAN ASSISTANT

## 2021-08-06 PROCEDURE — 1160F RVW MEDS BY RX/DR IN RCRD: CPT | Mod: CPTII,S$GLB,, | Performed by: PHYSICIAN ASSISTANT

## 2021-08-06 PROCEDURE — 1159F PR MEDICATION LIST DOCUMENTED IN MEDICAL RECORD: ICD-10-PCS | Mod: CPTII,S$GLB,, | Performed by: PHYSICIAN ASSISTANT

## 2021-08-06 PROCEDURE — 1159F MED LIST DOCD IN RCRD: CPT | Mod: CPTII,S$GLB,, | Performed by: PHYSICIAN ASSISTANT

## 2021-08-06 PROCEDURE — 3008F BODY MASS INDEX DOCD: CPT | Mod: CPTII,S$GLB,, | Performed by: PHYSICIAN ASSISTANT

## 2021-08-06 PROCEDURE — 90715 TDAP VACCINE GREATER THAN OR EQUAL TO 7YO IM: ICD-10-PCS | Mod: S$GLB,,, | Performed by: PHYSICIAN ASSISTANT

## 2021-08-06 PROCEDURE — 90715 TDAP VACCINE 7 YRS/> IM: CPT | Mod: S$GLB,,, | Performed by: PHYSICIAN ASSISTANT

## 2021-08-06 PROCEDURE — 90471 TDAP VACCINE GREATER THAN OR EQUAL TO 7YO IM: ICD-10-PCS | Mod: 59,S$GLB,, | Performed by: PHYSICIAN ASSISTANT

## 2021-08-06 PROCEDURE — 3077F SYST BP >= 140 MM HG: CPT | Mod: CPTII,S$GLB,, | Performed by: PHYSICIAN ASSISTANT

## 2021-08-06 PROCEDURE — 3077F PR MOST RECENT SYSTOLIC BLOOD PRESSURE >= 140 MM HG: ICD-10-PCS | Mod: CPTII,S$GLB,, | Performed by: PHYSICIAN ASSISTANT

## 2021-08-06 PROCEDURE — 96372 THER/PROPH/DIAG INJ SC/IM: CPT | Mod: S$GLB,,, | Performed by: PHYSICIAN ASSISTANT

## 2021-08-06 PROCEDURE — 1160F PR REVIEW ALL MEDS BY PRESCRIBER/CLIN PHARMACIST DOCUMENTED: ICD-10-PCS | Mod: CPTII,S$GLB,, | Performed by: PHYSICIAN ASSISTANT

## 2021-08-06 PROCEDURE — 3008F PR BODY MASS INDEX (BMI) DOCUMENTED: ICD-10-PCS | Mod: CPTII,S$GLB,, | Performed by: PHYSICIAN ASSISTANT

## 2021-08-06 PROCEDURE — 3080F PR MOST RECENT DIASTOLIC BLOOD PRESSURE >= 90 MM HG: ICD-10-PCS | Mod: CPTII,S$GLB,, | Performed by: PHYSICIAN ASSISTANT

## 2021-08-06 RX ORDER — CEFTRIAXONE 500 MG/1
500 INJECTION, POWDER, FOR SOLUTION INTRAMUSCULAR; INTRAVENOUS ONCE
Status: COMPLETED | OUTPATIENT
Start: 2021-08-06 | End: 2021-08-06

## 2021-08-06 RX ORDER — MUPIROCIN 20 MG/G
OINTMENT TOPICAL 3 TIMES DAILY
Qty: 2 G | Refills: 0 | Status: SHIPPED | OUTPATIENT
Start: 2021-08-06 | End: 2021-08-13

## 2021-08-06 RX ADMIN — CEFTRIAXONE 500 MG: 500 INJECTION, POWDER, FOR SOLUTION INTRAMUSCULAR; INTRAVENOUS at 01:08

## 2021-09-23 ENCOUNTER — TELEPHONE (OUTPATIENT)
Dept: OBSTETRICS AND GYNECOLOGY | Facility: CLINIC | Age: 56
End: 2021-09-23

## 2021-09-28 ENCOUNTER — TELEPHONE (OUTPATIENT)
Dept: OBSTETRICS AND GYNECOLOGY | Facility: CLINIC | Age: 56
End: 2021-09-28

## 2021-10-01 ENCOUNTER — APPOINTMENT (OUTPATIENT)
Dept: RADIOLOGY | Facility: OTHER | Age: 56
End: 2021-10-01
Attending: OBSTETRICS & GYNECOLOGY
Payer: COMMERCIAL

## 2021-10-01 VITALS — HEIGHT: 66 IN | WEIGHT: 147.94 LBS | BODY MASS INDEX: 23.77 KG/M2

## 2021-10-01 DIAGNOSIS — Z12.31 SCREENING MAMMOGRAM, ENCOUNTER FOR: ICD-10-CM

## 2021-10-01 PROCEDURE — 77067 MAMMO DIGITAL SCREENING BILAT WITH TOMOSYNTHESIS_CAD: ICD-10-PCS | Mod: 26,,, | Performed by: RADIOLOGY

## 2021-10-01 PROCEDURE — 77067 SCR MAMMO BI INCL CAD: CPT | Mod: TC,PN

## 2021-10-01 PROCEDURE — 77067 SCR MAMMO BI INCL CAD: CPT | Mod: 26,,, | Performed by: RADIOLOGY

## 2021-10-01 PROCEDURE — 77063 BREAST TOMOSYNTHESIS BI: CPT | Mod: 26,,, | Performed by: RADIOLOGY

## 2021-10-01 PROCEDURE — 77063 MAMMO DIGITAL SCREENING BILAT WITH TOMOSYNTHESIS_CAD: ICD-10-PCS | Mod: 26,,, | Performed by: RADIOLOGY

## 2021-10-19 ENCOUNTER — PATIENT MESSAGE (OUTPATIENT)
Dept: OBSTETRICS AND GYNECOLOGY | Facility: CLINIC | Age: 56
End: 2021-10-19
Payer: COMMERCIAL

## 2021-10-20 DIAGNOSIS — Z79.890 POSTMENOPAUSAL HORMONE REPLACEMENT THERAPY: ICD-10-CM

## 2021-10-21 RX ORDER — PROGESTERONE 200 MG/1
200 CAPSULE ORAL NIGHTLY
Qty: 90 CAPSULE | Refills: 0 | Status: SHIPPED | OUTPATIENT
Start: 2021-10-21 | End: 2021-11-10 | Stop reason: SDUPTHER

## 2021-11-10 ENCOUNTER — OFFICE VISIT (OUTPATIENT)
Dept: OBSTETRICS AND GYNECOLOGY | Facility: CLINIC | Age: 56
End: 2021-11-10
Payer: COMMERCIAL

## 2021-11-10 VITALS
HEIGHT: 66 IN | BODY MASS INDEX: 24.73 KG/M2 | DIASTOLIC BLOOD PRESSURE: 84 MMHG | SYSTOLIC BLOOD PRESSURE: 158 MMHG | WEIGHT: 153.88 LBS

## 2021-11-10 DIAGNOSIS — Z79.890 POSTMENOPAUSAL HORMONE REPLACEMENT THERAPY: ICD-10-CM

## 2021-11-10 DIAGNOSIS — Z12.31 SCREENING MAMMOGRAM, ENCOUNTER FOR: ICD-10-CM

## 2021-11-10 DIAGNOSIS — Z01.419 ENCOUNTER FOR GYNECOLOGICAL EXAMINATION WITHOUT ABNORMAL FINDING: Primary | ICD-10-CM

## 2021-11-10 PROCEDURE — 99396 PREV VISIT EST AGE 40-64: CPT | Mod: S$GLB,,, | Performed by: OBSTETRICS & GYNECOLOGY

## 2021-11-10 PROCEDURE — 3077F PR MOST RECENT SYSTOLIC BLOOD PRESSURE >= 140 MM HG: ICD-10-PCS | Mod: CPTII,S$GLB,, | Performed by: OBSTETRICS & GYNECOLOGY

## 2021-11-10 PROCEDURE — 1159F PR MEDICATION LIST DOCUMENTED IN MEDICAL RECORD: ICD-10-PCS | Mod: CPTII,S$GLB,, | Performed by: OBSTETRICS & GYNECOLOGY

## 2021-11-10 PROCEDURE — 3079F PR MOST RECENT DIASTOLIC BLOOD PRESSURE 80-89 MM HG: ICD-10-PCS | Mod: CPTII,S$GLB,, | Performed by: OBSTETRICS & GYNECOLOGY

## 2021-11-10 PROCEDURE — 1159F MED LIST DOCD IN RCRD: CPT | Mod: CPTII,S$GLB,, | Performed by: OBSTETRICS & GYNECOLOGY

## 2021-11-10 PROCEDURE — 3077F SYST BP >= 140 MM HG: CPT | Mod: CPTII,S$GLB,, | Performed by: OBSTETRICS & GYNECOLOGY

## 2021-11-10 PROCEDURE — 99396 PR PREVENTIVE VISIT,EST,40-64: ICD-10-PCS | Mod: S$GLB,,, | Performed by: OBSTETRICS & GYNECOLOGY

## 2021-11-10 PROCEDURE — 3008F PR BODY MASS INDEX (BMI) DOCUMENTED: ICD-10-PCS | Mod: CPTII,S$GLB,, | Performed by: OBSTETRICS & GYNECOLOGY

## 2021-11-10 PROCEDURE — 99999 PR PBB SHADOW E&M-EST. PATIENT-LVL III: CPT | Mod: PBBFAC,,, | Performed by: OBSTETRICS & GYNECOLOGY

## 2021-11-10 PROCEDURE — 3008F BODY MASS INDEX DOCD: CPT | Mod: CPTII,S$GLB,, | Performed by: OBSTETRICS & GYNECOLOGY

## 2021-11-10 PROCEDURE — 99999 PR PBB SHADOW E&M-EST. PATIENT-LVL III: ICD-10-PCS | Mod: PBBFAC,,, | Performed by: OBSTETRICS & GYNECOLOGY

## 2021-11-10 PROCEDURE — 3079F DIAST BP 80-89 MM HG: CPT | Mod: CPTII,S$GLB,, | Performed by: OBSTETRICS & GYNECOLOGY

## 2021-11-10 RX ORDER — METHOCARBAMOL 500 MG/1
500 TABLET, FILM COATED ORAL 3 TIMES DAILY
COMMUNITY
Start: 2021-08-17 | End: 2022-08-25

## 2021-11-10 RX ORDER — CYCLOBENZAPRINE HCL 10 MG
10 TABLET ORAL NIGHTLY PRN
COMMUNITY
Start: 2021-08-17 | End: 2023-04-26

## 2021-11-10 RX ORDER — ZINC GLUCONATE 50 MG
TABLET ORAL
COMMUNITY
Start: 2019-01-01

## 2021-11-10 RX ORDER — FLUTICASONE PROPIONATE 50 MCG
1 SPRAY, SUSPENSION (ML) NASAL 2 TIMES DAILY
COMMUNITY
Start: 2021-09-27

## 2021-11-10 RX ORDER — PROGESTERONE 200 MG/1
200 CAPSULE ORAL NIGHTLY
Qty: 90 CAPSULE | Refills: 3 | Status: SHIPPED | OUTPATIENT
Start: 2021-11-10 | End: 2023-01-11 | Stop reason: SDUPTHER

## 2021-11-10 RX ORDER — AZITHROMYCIN 250 MG/1
250 TABLET, FILM COATED ORAL
COMMUNITY
Start: 2021-11-05 | End: 2022-08-25

## 2022-01-13 ENCOUNTER — OFFICE VISIT (OUTPATIENT)
Dept: URGENT CARE | Facility: CLINIC | Age: 57
End: 2022-01-13
Payer: COMMERCIAL

## 2022-01-13 VITALS
SYSTOLIC BLOOD PRESSURE: 144 MMHG | DIASTOLIC BLOOD PRESSURE: 93 MMHG | RESPIRATION RATE: 18 BRPM | HEART RATE: 81 BPM | TEMPERATURE: 99 F | HEIGHT: 66 IN | OXYGEN SATURATION: 98 % | BODY MASS INDEX: 24.11 KG/M2 | WEIGHT: 150 LBS

## 2022-01-13 DIAGNOSIS — U07.1 COVID-19: ICD-10-CM

## 2022-01-13 DIAGNOSIS — Z20.822 ENCOUNTER FOR LABORATORY TESTING FOR COVID-19 VIRUS: Primary | ICD-10-CM

## 2022-01-13 DIAGNOSIS — R19.7 DIARRHEA, UNSPECIFIED TYPE: ICD-10-CM

## 2022-01-13 DIAGNOSIS — J06.9 ACUTE URI: ICD-10-CM

## 2022-01-13 LAB
CTP QC/QA: YES
SARS-COV-2 RDRP RESP QL NAA+PROBE: POSITIVE

## 2022-01-13 PROCEDURE — 3077F SYST BP >= 140 MM HG: CPT | Mod: CPTII,S$GLB,, | Performed by: FAMILY MEDICINE

## 2022-01-13 PROCEDURE — 1160F RVW MEDS BY RX/DR IN RCRD: CPT | Mod: CPTII,S$GLB,, | Performed by: FAMILY MEDICINE

## 2022-01-13 PROCEDURE — 99214 PR OFFICE/OUTPT VISIT, EST, LEVL IV, 30-39 MIN: ICD-10-PCS | Mod: S$GLB,,, | Performed by: FAMILY MEDICINE

## 2022-01-13 PROCEDURE — 3080F DIAST BP >= 90 MM HG: CPT | Mod: CPTII,S$GLB,, | Performed by: FAMILY MEDICINE

## 2022-01-13 PROCEDURE — 1159F MED LIST DOCD IN RCRD: CPT | Mod: CPTII,S$GLB,, | Performed by: FAMILY MEDICINE

## 2022-01-13 PROCEDURE — 3008F BODY MASS INDEX DOCD: CPT | Mod: CPTII,S$GLB,, | Performed by: FAMILY MEDICINE

## 2022-01-13 PROCEDURE — 1160F PR REVIEW ALL MEDS BY PRESCRIBER/CLIN PHARMACIST DOCUMENTED: ICD-10-PCS | Mod: CPTII,S$GLB,, | Performed by: FAMILY MEDICINE

## 2022-01-13 PROCEDURE — 3077F PR MOST RECENT SYSTOLIC BLOOD PRESSURE >= 140 MM HG: ICD-10-PCS | Mod: CPTII,S$GLB,, | Performed by: FAMILY MEDICINE

## 2022-01-13 PROCEDURE — 99214 OFFICE O/P EST MOD 30 MIN: CPT | Mod: S$GLB,,, | Performed by: FAMILY MEDICINE

## 2022-01-13 PROCEDURE — 3008F PR BODY MASS INDEX (BMI) DOCUMENTED: ICD-10-PCS | Mod: CPTII,S$GLB,, | Performed by: FAMILY MEDICINE

## 2022-01-13 PROCEDURE — U0002 COVID-19 LAB TEST NON-CDC: HCPCS | Mod: QW,S$GLB,, | Performed by: FAMILY MEDICINE

## 2022-01-13 PROCEDURE — U0002: ICD-10-PCS | Mod: QW,S$GLB,, | Performed by: FAMILY MEDICINE

## 2022-01-13 PROCEDURE — 1159F PR MEDICATION LIST DOCUMENTED IN MEDICAL RECORD: ICD-10-PCS | Mod: CPTII,S$GLB,, | Performed by: FAMILY MEDICINE

## 2022-01-13 PROCEDURE — 3080F PR MOST RECENT DIASTOLIC BLOOD PRESSURE >= 90 MM HG: ICD-10-PCS | Mod: CPTII,S$GLB,, | Performed by: FAMILY MEDICINE

## 2022-01-13 NOTE — PROGRESS NOTES
"Subjective:       Patient ID: Chaya Tam is a 56 y.o. female.    Vitals:  height is 5' 6" (1.676 m) and weight is 68 kg (150 lb). Her oral temperature is 98.5 °F (36.9 °C). Her blood pressure is 144/93 (abnormal) and her pulse is 81. Her respiration is 18 and oxygen saturation is 98%.     Chief Complaint: Cough, Headache, and Diarrhea    Patient presents with c/o mild fever, cough, sinus/chest congestion and mild diarrhea for 3 Days.  Deneis  CP, SOB, HA, body aches, weakness/dizziness, N/V, abdominal pain  dysuria, loss of smell or taste.  Denies known COVID exposure. No known exposure to COVIDFully vaccinated. Positive COVID 10/11/2020    Fever   This is a new problem. The problem occurs constantly. The problem has been gradually worsening. The maximum temperature noted was 100 to 100.9 F. The temperature was taken using an oral thermometer. Associated symptoms include congestion, coughing, diarrhea, muscle aches and sleepiness. Pertinent negatives include no abdominal pain, chest pain, ear pain, headaches, nausea, rash, sore throat, urinary pain, vomiting or wheezing. She has tried acetaminophen and NSAIDs for the symptoms.   Risk factors: no immunosuppression and no sick contacts        Constitution: Positive for fever.   HENT: Positive for congestion. Negative for ear pain and sore throat.    Cardiovascular: Negative for chest pain.   Respiratory: Positive for cough. Negative for wheezing.    Gastrointestinal: Positive for diarrhea. Negative for abdominal pain, nausea and vomiting.   Genitourinary: Negative for dysuria.   Skin: Negative for rash.   Neurological: Negative for headaches.       Objective:      Physical Exam   Constitutional: She is oriented to person, place, and time. She appears well-developed and well-nourished. She is cooperative.  Non-toxic appearance. She does not appear ill. No distress.   HENT:   Head: Normocephalic and atraumatic.   Ears:   Right Ear: Hearing normal.   Left Ear: " Hearing normal.   Nose: Congestion present. No mucosal edema, rhinorrhea or nasal deformity. No epistaxis. Right sinus exhibits no maxillary sinus tenderness and no frontal sinus tenderness. Left sinus exhibits no maxillary sinus tenderness and no frontal sinus tenderness.   Mouth/Throat: Uvula is midline, oropharynx is clear and moist and mucous membranes are normal. No trismus in the jaw. Normal dentition. No uvula swelling.   Eyes: Conjunctivae and lids are normal. Right eye exhibits no discharge. Left eye exhibits no discharge. No scleral icterus.   Neck: Trachea normal and phonation normal. Neck supple.   Cardiovascular: Normal rate, regular rhythm, normal heart sounds, intact distal pulses and normal pulses.   No murmur heard.  Pulmonary/Chest: Effort normal and breath sounds normal. No stridor. No respiratory distress. She has no wheezes. She has no rhonchi. She has no rales.   Abdominal: Normal appearance and bowel sounds are normal. She exhibits no distension and no mass. Soft. There is no abdominal tenderness.   Musculoskeletal: Normal range of motion.         General: No deformity or edema. Normal range of motion.      Cervical back: She exhibits no tenderness.   Lymphadenopathy:     She has no cervical adenopathy.   Neurological: She is alert and oriented to person, place, and time. She exhibits normal muscle tone. Coordination normal.   Skin: Skin is warm, dry, intact, not diaphoretic and not pale.   Psychiatric: She has a normal mood and affect. Her speech is normal and behavior is normal. Judgment and thought content normal.   Nursing note and vitals reviewed.        Assessment:       1. Encounter for laboratory testing for COVID-19 virus    2. Acute URI    3. COVID-19    4. Diarrhea, unspecified type          Plan:     0     Counseled patient and answered questions in regards to COVID-19 testing and diagnosis. Quarantine precautions, home care with plenty of fluids and use of OTC meds discussed. Use  of pulse oxymeter discussed. Advised the patient to inform the PCP for +ve COVID status. ER precautions discussed. Patient verbalized understanding.    Pt's risk score is calculated as 0        Encounter for laboratory testing for COVID-19 virus  -     POCT COVID-19 Rapid Screening    Acute URI    COVID-19    Diarrhea, unspecified type    PLEASE READ YOUR DISCHARGE INSTRUCTIONS ENTIRELY AS IT CONTAINS IMPORTANT INFORMATION.    Please return here or go to the Emergency Department for any concerns or worsening of condition.      Get rest  and drink plenty of fluids.     If not allergic, please take over the counter Tylenol (Acetaminophen) and/or Motrin (Ibuprofen) as directed for control of pain and/or fever. Antiinflammatories like Ibuprofen, Advil, Aleve, Motrin, Naproxen, Meloxicam should not be taken in case of stomach ulcers, kidney disease or if on blood thinners. These medicines should always be taken with food.      Take an OTC cough or cold medicine as needed.    If your symptoms get worse or start developing difficulty breathing or oxygen saturation drops below 94, go to ER for further evaluation and treatment.      You may need to buy a pulse oxymeter from a pharmacy to check oxygen saturation    Please follow up with your primary care doctor or specialist as needed.    If you smoke, please stop smoking.    Please return or see your primary care doctor if you develop new or worsening symptoms.     Please arrange follow up with your primary medical clinic as soon as possible. You must understand that you've received an Urgent Care treatment only and that you may be released before all of your medical problems are known or treated. You, the patient, will arrange for follow up as instructed. If your symptoms worsen or fail to improve you should go to the Emergency Room.Instructions for Patients with Confirmed or Suspected COVID-19    Patient Education       COVID-19 Discharge Instructions   About this topic    Coronavirus disease 2019 is also known as COVID-19. It is a viral illness that infects the lungs. It is caused by a virus called SARS-associated coronavirus (SARS-CoV-2).  The signs of COVID-19 most often start a few days after you have been infected. In some people, it takes longer to show signs. Others never show signs of the infection. You may have a cough, fever, shaking chills and it may be hard to breathe. You may be very tired, have muscle aches, a headache or sore throat. Some people have an upset stomach or loose stools. Others lose their sense of smell or taste. You may not have these signs all the time and they may come and go while you are sick.  The virus spreads easily through droplets when you talk, sneeze, or cough. You can pass the virus to others when you are talking close together, singing, hugging, sharing food, or shaking hands. Doctors believe the germs also survive on surfaces like tables, door handles, and telephones. However, this is not a common way that COVID-19 spreads. Doctors believe you can also spread the infection even if you dont have any symptoms, but they do not know how that happens. This is why getting vaccinated is one of the best ways to keep you healthy and slow the spread of the virus.  Some people have a mild case of COVID-19 and are able to stay at home and away from others until they feel better. Others may need to be in the hospital if they are very sick. Some people with COVID-19 can have some symptoms for weeks or months. People with COVID-19 must isolate themselves. You can start to be around others when your doctor says it is safe to do so.       What care is needed at home?   · Ask your doctor what you need to do when you go home. Make sure you ask questions if you do not understand what the doctor says.  · Drink lots of water, juice, or broth to replace fluids lost from a fever.  · You may use cool mist humidifiers to help ease congestion and coughing.  · Use 2  to 3 pillows to prop yourself up when you lie down to make it easier to breathe and sleep.  · Do not smoke and do not drink beer, wine, and mixed drinks (alcohol).  · To lower the chance of passing the infection to others, get a COVID-19 vaccine after your infection has resolved.  · If you have not been fully vaccinated:  ? Wear a mask over your mouth and nose if you are around others who are not sick. Cloth masks work best if they have more than one layer of fabric.  ? Wash your hands often.  ? Stay home in a separate room, if possible, away from others. Only go out to get medical care.  ? Use a separate bathroom if possible.  ? Do not make food for others.  What follow-up care is needed?   · Your doctor may ask you to make visits to the office to check on your progress. Be sure to keep these visits. Make sure you wear a mask at these visits.  · If you can, tell the staff you have COVID-19 ahead of time so they can take extra care to stop the disease from spreading.  · It may take a few weeks before your health returns to normal.  What drugs may be needed?   The doctor may order drugs to:  · Help with breathing  · Help with fever  · Help with swelling in your airways and lungs  · Control coughing  · Ease a sore throat  · Help a runny or stuffy nose  Will physical activity be limited?   You may have to limit your physical activity. Talk to your doctor about the right amount of activity for you. If you have been very sick with COVID-19, it can take some time to get your strength back.  Will there be any other care needed?   Doctors do not know how long you can pass the virus on to others after you are sick. This is why it is important to stay in a separate room, if possible, when you are sick. For now, doctors are giving general guidelines for you to follow after you have been sick. Before you go around other people, you should:  · Be fever free for 24 hours without taking any drugs to lower the fever  · Have no  symptoms of cough or shortness of breath  · Wait at least 10 days after first having symptoms or your first positive test, and you need to be symptom free as above. Some experts suggest waiting 20 days if you have had a more severe infection.  Talk with your doctor about getting a COVID-19 vaccine.  What problems could happen?   · Fluid loss. This is dehydration.  · Short-term or long-term lung damage  · Heart problems  · Death  When do I need to call the doctor?   · You are having so much trouble breathing that you can only say one or two words at a time.  · You need to sit upright at all times to be able to breathe and/or cannot lie down.  · You are very confused or cannot stay awake.  · Your lips or skin start to turn blue or grey.  · You think you might be having a medical emergency. Some examples of medical emergencies are:  ? Severe chest pain.  ? Not able to speak or move normally.  · You have trouble breathing when talking or sitting still.  · You have new shortness of breath.  · You become weak or dizzy.  · You have very dark urine or do not pass urine for more than 8 hours.  · You have new or worsening COVID-19 symptoms like:  ? Fever  ? Cough  ? Feeling very tired  ? Shaking chills  ? Headache  ? Trouble swallowing  ? Throwing up  ? Loose stools  ? Reddish purple spots on your fingers or toes  Teach Back: Helping You Understand   The Teach Back Method helps you understand the information we are giving you. After you talk with the staff, tell them in your own words what you learned. This helps to make sure the staff has described each thing clearly. It also helps to explain things that may have been confusing. Before going home, make sure you can do these:  · I can tell you about my condition.  · I can tell you what may help ease my breathing.  · I can tell you what I can do to help avoid passing the infection to others.  · I can tell you what I will do if I have trouble breathing; feel sleepy or confused;  or my fingertips, fingernails, skin, or lips are blue.  Where can I learn more?   Centers for Disease Control and Prevention  https://www.cdc.gov/coronavirus/2019-ncov/about/index.html   Centers for Disease Control and Prevention  https://www.cdc.gov/coronavirus/2019-ncov/hcp/disposition-in-home-patients.html   World Health Organization  https://www.who.int/news-room/q-a-detail/a-r-hwckegquosbjp   Last Reviewed Date   2021-10-05  Consumer Information Use and Disclaimer   This information is not specific medical advice and does not replace information you receive from your health care provider. This is only a brief summary of general information. It does NOT include all information about conditions, illnesses, injuries, tests, procedures, treatments, therapies, discharge instructions or life-style choices that may apply to you. You must talk with your health care provider for complete information about your health and treatment options. This information should not be used to decide whether or not to accept your health care providers advice, instructions or recommendations. Only your health care provider has the knowledge and training to provide advice that is right for you.  Copyright   Copyright © 2021 [employer] and its affiliates and/or licensors. All rights reserved.         Diarrhea, Adult ED   General Information   You came to the Emergency Department (ED) for diarrhea. Most doctors say you have diarrhea if you have 3 or more runny or watery stools or bowel movements in a day. The doctors feel that the risk of a serious cause for your diarrhea is low.  Diarrhea that starts all of a sudden is most often caused by a virus or bacteria. This will likely get better on its own after a few days. Other things can cause you to have loose stools like:  · Side effects from the medicines you take.  · Problems digesting your food.  · Problems with your digestive system.  You may be waiting on some test results. The  staff will contact you if there are concerning results.  What care is needed at home?   · Call your regular doctor to let them know you were in the ED. Make a follow-up appointment if you were told to.  · Drink small amounts of fluid every 15 to 30 minutes. Good fluids to drink are water, broth, and oral electrolyte solutions. Sugar-free or very low sugar sports drinks are also OK.  · Try to eat a small amount of food. Good foods to eat are potatoes, noodles, rice, oatmeal, crackers, soup, soft vegetables, and bananas. Avoid fatty foods and dairy products.  · Wash your hands often. This will help keep others healthy. It is easy to spread diarrhea from one person to the next.  · Stay home from school or work until you have stopped having diarrhea. Do not cook food for others while you have diarrhea.  · If you were given any medicines, make sure to take them as you were told.  When do I need to get emergency help?   · Return to the ED if:   ? You have signs of severe fluid loss, such as:  § No urine for more than 8 hours.  § Feel very light-headed or like you are going to pass out.  § Feel weak like you are going to fall.  ? Your stools have a large amount (more than 1 teaspoon or 5 mL) of blood in them.  ? You have very bad belly pain.  When do I need to call the doctor?   · You have more than 6 runny, watery stools in 24 hours.  · You have a fever of 101.3°F (38.5°C) or higher or chills that do not go away after a day.  · You have very bad belly pain.  · Your stools have a small amount (less than 1 teaspoon or 5 mL) of blood in them.  · You develop early signs of fluid loss, such as:  ? Your urine is very dark colored.  ? Your mouth is dry.  ? You have muscle cramps.  ? You have a lack of energy.  ? You feel light-headed when you get up.  · You have new or worsening symptoms.  Last Reviewed Date   2021-05-21  Consumer Information Use and Disclaimer   This information is not specific medical advice and does not  replace information you receive from your health care provider. This is only a brief summary of general information. It does NOT include all information about conditions, illnesses, injuries, tests, procedures, treatments, therapies, discharge instructions or life-style choices that may apply to you. You must talk with your health care provider for complete information about your health and treatment options. This information should not be used to decide whether or not to accept your health care providers advice, instructions or recommendations. Only your health care provider has the knowledge and training to provide advice that is right for you.  Copyright   Copyright © 2021 [employer] and its affiliates and/or licensors. All rights reserved.

## 2022-01-13 NOTE — PATIENT INSTRUCTIONS
PLEASE READ YOUR DISCHARGE INSTRUCTIONS ENTIRELY AS IT CONTAINS IMPORTANT INFORMATION.    Please return here or go to the Emergency Department for any concerns or worsening of condition.      Get rest  and drink plenty of fluids.     If not allergic, please take over the counter Tylenol (Acetaminophen) and/or Motrin (Ibuprofen) as directed for control of pain and/or fever. Antiinflammatories like Ibuprofen, Advil, Aleve, Motrin, Naproxen, Meloxicam should not be taken in case of stomach ulcers, kidney disease or if on blood thinners. These medicines should always be taken with food.      Take an OTC cough or cold medicine as needed.    If your symptoms get worse or start developing difficulty breathing or oxygen saturation drops below 94, go to ER for further evaluation and treatment.      You may need to buy a pulse oxymeter from a pharmacy to check oxygen saturation    Please follow up with your primary care doctor or specialist as needed.    If you smoke, please stop smoking.    Please return or see your primary care doctor if you develop new or worsening symptoms.     Please arrange follow up with your primary medical clinic as soon as possible. You must understand that you've received an Urgent Care treatment only and that you may be released before all of your medical problems are known or treated. You, the patient, will arrange for follow up as instructed. If your symptoms worsen or fail to improve you should go to the Emergency Room.Instructions for Patients with Confirmed or Suspected COVID-19    Patient Education       COVID-19 Discharge Instructions   About this topic   Coronavirus disease 2019 is also known as COVID-19. It is a viral illness that infects the lungs. It is caused by a virus called SARS-associated coronavirus (SARS-CoV-2).  The signs of COVID-19 most often start a few days after you have been infected. In some people, it takes longer to show signs. Others never show signs of the infection.  You may have a cough, fever, shaking chills and it may be hard to breathe. You may be very tired, have muscle aches, a headache or sore throat. Some people have an upset stomach or loose stools. Others lose their sense of smell or taste. You may not have these signs all the time and they may come and go while you are sick.  The virus spreads easily through droplets when you talk, sneeze, or cough. You can pass the virus to others when you are talking close together, singing, hugging, sharing food, or shaking hands. Doctors believe the germs also survive on surfaces like tables, door handles, and telephones. However, this is not a common way that COVID-19 spreads. Doctors believe you can also spread the infection even if you dont have any symptoms, but they do not know how that happens. This is why getting vaccinated is one of the best ways to keep you healthy and slow the spread of the virus.  Some people have a mild case of COVID-19 and are able to stay at home and away from others until they feel better. Others may need to be in the hospital if they are very sick. Some people with COVID-19 can have some symptoms for weeks or months. People with COVID-19 must isolate themselves. You can start to be around others when your doctor says it is safe to do so.       What care is needed at home?   · Ask your doctor what you need to do when you go home. Make sure you ask questions if you do not understand what the doctor says.  · Drink lots of water, juice, or broth to replace fluids lost from a fever.  · You may use cool mist humidifiers to help ease congestion and coughing.  · Use 2 to 3 pillows to prop yourself up when you lie down to make it easier to breathe and sleep.  · Do not smoke and do not drink beer, wine, and mixed drinks (alcohol).  · To lower the chance of passing the infection to others, get a COVID-19 vaccine after your infection has resolved.  · If you have not been fully vaccinated:  ? Wear a mask over  your mouth and nose if you are around others who are not sick. Cloth masks work best if they have more than one layer of fabric.  ? Wash your hands often.  ? Stay home in a separate room, if possible, away from others. Only go out to get medical care.  ? Use a separate bathroom if possible.  ? Do not make food for others.  What follow-up care is needed?   · Your doctor may ask you to make visits to the office to check on your progress. Be sure to keep these visits. Make sure you wear a mask at these visits.  · If you can, tell the staff you have COVID-19 ahead of time so they can take extra care to stop the disease from spreading.  · It may take a few weeks before your health returns to normal.  What drugs may be needed?   The doctor may order drugs to:  · Help with breathing  · Help with fever  · Help with swelling in your airways and lungs  · Control coughing  · Ease a sore throat  · Help a runny or stuffy nose  Will physical activity be limited?   You may have to limit your physical activity. Talk to your doctor about the right amount of activity for you. If you have been very sick with COVID-19, it can take some time to get your strength back.  Will there be any other care needed?   Doctors do not know how long you can pass the virus on to others after you are sick. This is why it is important to stay in a separate room, if possible, when you are sick. For now, doctors are giving general guidelines for you to follow after you have been sick. Before you go around other people, you should:  · Be fever free for 24 hours without taking any drugs to lower the fever  · Have no symptoms of cough or shortness of breath  · Wait at least 10 days after first having symptoms or your first positive test, and you need to be symptom free as above. Some experts suggest waiting 20 days if you have had a more severe infection.  Talk with your doctor about getting a COVID-19 vaccine.  What problems could happen?   · Fluid loss.  This is dehydration.  · Short-term or long-term lung damage  · Heart problems  · Death  When do I need to call the doctor?   · You are having so much trouble breathing that you can only say one or two words at a time.  · You need to sit upright at all times to be able to breathe and/or cannot lie down.  · You are very confused or cannot stay awake.  · Your lips or skin start to turn blue or grey.  · You think you might be having a medical emergency. Some examples of medical emergencies are:  ? Severe chest pain.  ? Not able to speak or move normally.  · You have trouble breathing when talking or sitting still.  · You have new shortness of breath.  · You become weak or dizzy.  · You have very dark urine or do not pass urine for more than 8 hours.  · You have new or worsening COVID-19 symptoms like:  ? Fever  ? Cough  ? Feeling very tired  ? Shaking chills  ? Headache  ? Trouble swallowing  ? Throwing up  ? Loose stools  ? Reddish purple spots on your fingers or toes  Teach Back: Helping You Understand   The Teach Back Method helps you understand the information we are giving you. After you talk with the staff, tell them in your own words what you learned. This helps to make sure the staff has described each thing clearly. It also helps to explain things that may have been confusing. Before going home, make sure you can do these:  · I can tell you about my condition.  · I can tell you what may help ease my breathing.  · I can tell you what I can do to help avoid passing the infection to others.  · I can tell you what I will do if I have trouble breathing; feel sleepy or confused; or my fingertips, fingernails, skin, or lips are blue.  Where can I learn more?   Centers for Disease Control and Prevention  https://www.cdc.gov/coronavirus/2019-ncov/about/index.html   Centers for Disease Control and Prevention  https://www.cdc.gov/coronavirus/2019-ncov/hcp/disposition-in-home-patients.html   [facility]  Organization  https://www.who.int/news-room/q-a-detail/e-n-rrtwytkdqvpqr   Last Reviewed Date   2021-10-05  Consumer Information Use and Disclaimer   This information is not specific medical advice and does not replace information you receive from your health care provider. This is only a brief summary of general information. It does NOT include all information about conditions, illnesses, injuries, tests, procedures, treatments, therapies, discharge instructions or life-style choices that may apply to you. You must talk with your health care provider for complete information about your health and treatment options. This information should not be used to decide whether or not to accept your health care providers advice, instructions or recommendations. Only your health care provider has the knowledge and training to provide advice that is right for you.  Copyright   Copyright © 2021 [employer] and its affiliates and/or licensors. All rights reserved.  Patient Education       Diarrhea, Adult ED   General Information   You came to the Emergency Department (ED) for diarrhea. Most doctors say you have diarrhea if you have 3 or more runny or watery stools or bowel movements in a day. The doctors feel that the risk of a serious cause for your diarrhea is low.  Diarrhea that starts all of a sudden is most often caused by a virus or bacteria. This will likely get better on its own after a few days. Other things can cause you to have loose stools like:  · Side effects from the medicines you take.  · Problems digesting your food.  · Problems with your digestive system.  You may be waiting on some test results. The staff will contact you if there are concerning results.  What care is needed at home?   · Call your regular doctor to let them know you were in the ED. Make a follow-up appointment if you were told to.  · Drink small amounts of fluid every 15 to 30 minutes. Good fluids to drink are water, broth, and oral  electrolyte solutions. Sugar-free or very low sugar sports drinks are also OK.  · Try to eat a small amount of food. Good foods to eat are potatoes, noodles, rice, oatmeal, crackers, soup, soft vegetables, and bananas. Avoid fatty foods and dairy products.  · Wash your hands often. This will help keep others healthy. It is easy to spread diarrhea from one person to the next.  · Stay home from school or work until you have stopped having diarrhea. Do not cook food for others while you have diarrhea.  · If you were given any medicines, make sure to take them as you were told.  When do I need to get emergency help?   · Return to the ED if:   ? You have signs of severe fluid loss, such as:  § No urine for more than 8 hours.  § Feel very light-headed or like you are going to pass out.  § Feel weak like you are going to fall.  ? Your stools have a large amount (more than 1 teaspoon or 5 mL) of blood in them.  ? You have very bad belly pain.  When do I need to call the doctor?   · You have more than 6 runny, watery stools in 24 hours.  · You have a fever of 101.3°F (38.5°C) or higher or chills that do not go away after a day.  · You have very bad belly pain.  · Your stools have a small amount (less than 1 teaspoon or 5 mL) of blood in them.  · You develop early signs of fluid loss, such as:  ? Your urine is very dark colored.  ? Your mouth is dry.  ? You have muscle cramps.  ? You have a lack of energy.  ? You feel light-headed when you get up.  · You have new or worsening symptoms.  Last Reviewed Date   2021-05-21  Consumer Information Use and Disclaimer   This information is not specific medical advice and does not replace information you receive from your health care provider. This is only a brief summary of general information. It does NOT include all information about conditions, illnesses, injuries, tests, procedures, treatments, therapies, discharge instructions or life-style choices that may apply to you. You must  talk with your health care provider for complete information about your health and treatment options. This information should not be used to decide whether or not to accept your health care providers advice, instructions or recommendations. Only your health care provider has the knowledge and training to provide advice that is right for you.  Copyright   Copyright © 2021 [employer] and its affiliates and/or licensors. All rights reserved.

## 2022-02-07 DIAGNOSIS — N64.59 BREAST THICKENING: Primary | ICD-10-CM

## 2022-02-14 ENCOUNTER — PATIENT MESSAGE (OUTPATIENT)
Dept: RESEARCH | Facility: HOSPITAL | Age: 57
End: 2022-02-14
Payer: COMMERCIAL

## 2022-02-18 ENCOUNTER — OFFICE VISIT (OUTPATIENT)
Dept: URGENT CARE | Facility: CLINIC | Age: 57
End: 2022-02-18
Payer: COMMERCIAL

## 2022-02-18 ENCOUNTER — HOSPITAL ENCOUNTER (OUTPATIENT)
Dept: RADIOLOGY | Facility: HOSPITAL | Age: 57
Discharge: HOME | End: 2022-02-18
Attending: OBSTETRICS & GYNECOLOGY
Payer: COMMERCIAL

## 2022-02-18 VITALS
WEIGHT: 150 LBS | RESPIRATION RATE: 18 BRPM | HEART RATE: 96 BPM | SYSTOLIC BLOOD PRESSURE: 129 MMHG | DIASTOLIC BLOOD PRESSURE: 84 MMHG | BODY MASS INDEX: 24.11 KG/M2 | TEMPERATURE: 98 F | OXYGEN SATURATION: 98 % | HEIGHT: 66 IN

## 2022-02-18 DIAGNOSIS — N64.59 BREAST THICKENING: ICD-10-CM

## 2022-02-18 DIAGNOSIS — J06.9 VIRAL URI WITH COUGH: Primary | ICD-10-CM

## 2022-02-18 PROBLEM — N20.0 CALCULUS OF KIDNEY: Status: ACTIVE | Noted: 2022-02-18

## 2022-02-18 PROCEDURE — 77065 DX MAMMO INCL CAD UNI: CPT | Mod: TC,LT

## 2022-02-18 PROCEDURE — 3074F PR MOST RECENT SYSTOLIC BLOOD PRESSURE < 130 MM HG: ICD-10-PCS | Mod: CPTII,S$GLB,, | Performed by: PHYSICIAN ASSISTANT

## 2022-02-18 PROCEDURE — 1160F RVW MEDS BY RX/DR IN RCRD: CPT | Mod: CPTII,S$GLB,, | Performed by: PHYSICIAN ASSISTANT

## 2022-02-18 PROCEDURE — 77065 MAMMO DIGITAL DIAGNOSTIC LEFT WITH TOMO: ICD-10-PCS | Mod: 26,LT,, | Performed by: RADIOLOGY

## 2022-02-18 PROCEDURE — 76642 ULTRASOUND BREAST LIMITED: CPT | Mod: TC,LT

## 2022-02-18 PROCEDURE — 77061 MAMMO DIGITAL DIAGNOSTIC LEFT WITH TOMO: ICD-10-PCS | Mod: 26,LT,, | Performed by: RADIOLOGY

## 2022-02-18 PROCEDURE — 1160F PR REVIEW ALL MEDS BY PRESCRIBER/CLIN PHARMACIST DOCUMENTED: ICD-10-PCS | Mod: CPTII,S$GLB,, | Performed by: PHYSICIAN ASSISTANT

## 2022-02-18 PROCEDURE — 3079F PR MOST RECENT DIASTOLIC BLOOD PRESSURE 80-89 MM HG: ICD-10-PCS | Mod: CPTII,S$GLB,, | Performed by: PHYSICIAN ASSISTANT

## 2022-02-18 PROCEDURE — 77065 DX MAMMO INCL CAD UNI: CPT | Mod: 26,LT,, | Performed by: RADIOLOGY

## 2022-02-18 PROCEDURE — 76642 ULTRASOUND BREAST LIMITED: CPT | Mod: 26,LT,, | Performed by: RADIOLOGY

## 2022-02-18 PROCEDURE — 3008F PR BODY MASS INDEX (BMI) DOCUMENTED: ICD-10-PCS | Mod: CPTII,S$GLB,, | Performed by: PHYSICIAN ASSISTANT

## 2022-02-18 PROCEDURE — 77061 BREAST TOMOSYNTHESIS UNI: CPT | Mod: 26,LT,, | Performed by: RADIOLOGY

## 2022-02-18 PROCEDURE — 76642 US BREAST LEFT LIMITED: ICD-10-PCS | Mod: 26,LT,, | Performed by: RADIOLOGY

## 2022-02-18 PROCEDURE — 3008F BODY MASS INDEX DOCD: CPT | Mod: CPTII,S$GLB,, | Performed by: PHYSICIAN ASSISTANT

## 2022-02-18 PROCEDURE — 99213 PR OFFICE/OUTPT VISIT, EST, LEVL III, 20-29 MIN: ICD-10-PCS | Mod: S$GLB,,, | Performed by: PHYSICIAN ASSISTANT

## 2022-02-18 PROCEDURE — 1159F PR MEDICATION LIST DOCUMENTED IN MEDICAL RECORD: ICD-10-PCS | Mod: CPTII,S$GLB,, | Performed by: PHYSICIAN ASSISTANT

## 2022-02-18 PROCEDURE — 3079F DIAST BP 80-89 MM HG: CPT | Mod: CPTII,S$GLB,, | Performed by: PHYSICIAN ASSISTANT

## 2022-02-18 PROCEDURE — 99213 OFFICE O/P EST LOW 20 MIN: CPT | Mod: S$GLB,,, | Performed by: PHYSICIAN ASSISTANT

## 2022-02-18 PROCEDURE — 3074F SYST BP LT 130 MM HG: CPT | Mod: CPTII,S$GLB,, | Performed by: PHYSICIAN ASSISTANT

## 2022-02-18 PROCEDURE — 1159F MED LIST DOCD IN RCRD: CPT | Mod: CPTII,S$GLB,, | Performed by: PHYSICIAN ASSISTANT

## 2022-02-18 RX ORDER — PREDNISONE 10 MG/1
10 TABLET ORAL DAILY
Qty: 3 TABLET | Refills: 0 | Status: SHIPPED | OUTPATIENT
Start: 2022-02-18 | End: 2022-02-21

## 2022-02-18 RX ORDER — BENZONATATE 100 MG/1
200 CAPSULE ORAL 3 TIMES DAILY PRN
Qty: 60 CAPSULE | Refills: 0 | Status: SHIPPED | OUTPATIENT
Start: 2022-02-18 | End: 2022-08-25

## 2022-02-18 NOTE — PATIENT INSTRUCTIONS
PLEASE READ YOUR DISCHARGE INSTRUCTIONS ENTIRELY AS IT CONTAINS IMPORTANT INFORMATION.      Please drink plenty of fluids.    Please get plenty of rest.    Please return here or go to the Emergency Department for any concerns or worsening of condition.    Please take an over the counter antihistamine medication (allegra/Claritin/Zyrtec) of your choice as directed.    Try an over the counter decongestant like Mucinex D or Sudafed. You buy this behind the pharmacy counter    If you do have Hypertension or palpitations, it is safe to take Coricidin HBP for relief of sinus symptoms.    If not allergic, please take over the counter Tylenol (Acetaminophen) and/or Motrin (Ibuprofen) as directed for control of pain and/or fever.  Please follow up with your primary care doctor or specialist as needed.    Sore throat recommendations: Warm fluids, warm salt water gargles, throat lozenges, tea, honey, soup, rest, hydration.    Use over the counter flonase: one spray each nostril twice daily OR two sprays each nostril once daily.     Sinus rinses DO NOT USE TAP WATER, if you must, water must be a rolling boil for 1 minute, let it cool, then use.  May use distilled water, or over the counter nasal saline rinses.  Vics vapor rub in shower to help open nasal passages.  May use nasal gel to keep passages moisturized.  May use Nasal saline sprays during the day for added relief of congestion.   For those who go to the gym, please do not use the sauna or steam room now to clear sinuses.    If you  smoke, please stop smoking.      Please return or see your primary care doctor if you develop new or worsening symptoms.     Please arrange follow up with your primary medical clinic as soon as possible. You must understand that you've received an Urgent Care treatment only and that you may be released before all of your medical problems are known or treated. You, the patient, will arrange for follow up as instructed. If your symptoms worsen  or fail to improve you should go to the Emergency Room.  Patient Education       Cough Discharge Instructions, Adult   About this topic   Many things can cause a cough. A cold or allergies can cause a cough. Other times, asthma or smoking can cause your cough. You can have a cough from mucus that drips down the back of your throat or from stomach acid that backs up into your throat. Sometimes a cough is a side effect from a drug. You may be waiting on some test results. If so, the staff will contact you if there are concerning results.  What care is needed at home?   · Ask your doctor what you need to do when you go home. Make sure you ask questions if you do not understand what the doctor says.  · To help you feel better:  ? Use a cool mist humidifier to avoid breathing dry air.  ? Use hard candy or cough drops to soothe sore throat and cough.  ? Gargle with salt water (mix 1/2 teaspoon salt with 1 cup warm water) a few times a day.  ? Spray saltwater mist in each nostril. Any normal saline spray works.  ? Sip warm liquids to keep your throat moist.  ? Take warm, steamy showers to help soothe the cough.  · Do not smoke or be in smoke-filled places. Avoid things that may cause breathing problems like vaping, fumes, pollution, dust, and other common allergens.  · You may want to use over-the-counter medicines for allergies or acid reflux if your cough is due to one of these problems.  · You can also use an over-the-counter cough medicine.  What follow-up care is needed?   Your doctor may ask you to make visits to the office to check on your progress. Be sure to keep these visits.  What drugs may be needed?   The doctor may order drugs to:  · Control coughing  · Get rid of or thin mucus  · Block allergens if your cough is due to allergies  · Increase airflow if your cough is due to asthma or COPD  · Reduce swelling of the airways  · Reduce stomach acid if your cough is due to stomach acid problems  Will physical  activity be limited?   Your cough may interfere with some of your activities.  What changes to diet are needed?   Some people feel milk products worsen their sputum production and worsen their cough. There is no proof that this is true. There is no need to reduce milk intake. Honey has been shown to be as effective as the leading over-the-counter (OTC) drug for calming coughs. Use 1/2 to 1 teaspoon (2.5 mL to 5 mL) of honey as needed. It can thin the secretions and loosen the cough. Never give honey to a child under the age of 1.  What can be done to prevent this health problem?   · Wash your hands often with soap and water for at least 20 seconds, especially after coughing or sneezing. Alcohol-based hand sanitizers also work to kill the virus.       · If you are sick, cover your mouth and nose with tissue when you cough or sneeze. You can also cough into your elbow. Throw away tissues in the trash and wash your hands after touching used tissues.  · Clean items and surfaces you most often touch like door handles, remotes, phones, or toys. Wipe them with a disinfectant. This can help reduce the spread of infection.  · Do not get too close (kissing, hugging) to people who are sick.  · Do not share towels or hankies with anyone who is sick.  · Stay away from crowded places.  · Get a flu shot each year.     When do I need to call the doctor?   · You have chest pain when you cough or trouble breathing.  · You start to cough up blood or yellow or green mucus.  · You have a fever of 100.4°F (38°C) or higher or chills.  · You cough so hard you throw up.  · You are still coughing in 10 days.  Teach Back: Helping You Understand   The Teach Back Method helps you understand the information we are giving you. After you talk with the staff, tell them in your own words what you learned. This helps to make sure the staff has described each thing clearly. It also helps to explain things that may have been confusing. Before going  home, make sure you can do these:  · I can tell you about my condition.  · I can tell you what I can do to help thin the mucus and ease my cough.  · I can tell you what I will do if I have wheezing, chest tightness, my lips turn blue with coughing, or I have other trouble breathing.  Where can I learn more?   American Academy [employer]  https://familydoctor.org/cough-medicine-understanding-your-otc-options/   [facility]HS Choices  https://www.nhs.uk/conditions/cough/   Last Reviewed Date   2021-06-10  Consumer Information Use and Disclaimer   This information is not specific medical advice and does not replace information you receive from your health care provider. This is only a brief summary of general information. It does NOT include all information about conditions, illnesses, injuries, tests, procedures, treatments, therapies, discharge instructions or life-style choices that may apply to you. You must talk with your health care provider for complete information about your health and treatment options. This information should not be used to decide whether or not to accept your health care providers advice, instructions or recommendations. Only your health care provider has the knowledge and training to provide advice that is right for you.  Copyright   Copyright © 2021 [employer] and its affiliates and/or licensors. All rights reserved.

## 2022-05-26 ENCOUNTER — PATIENT MESSAGE (OUTPATIENT)
Dept: OBSTETRICS AND GYNECOLOGY | Facility: CLINIC | Age: 57
End: 2022-05-26
Payer: COMMERCIAL

## 2022-08-24 ENCOUNTER — PATIENT MESSAGE (OUTPATIENT)
Dept: ENDOCRINOLOGY | Facility: CLINIC | Age: 57
End: 2022-08-24

## 2022-08-24 ENCOUNTER — OFFICE VISIT (OUTPATIENT)
Dept: ENDOCRINOLOGY | Facility: CLINIC | Age: 57
End: 2022-08-24
Payer: COMMERCIAL

## 2022-08-24 DIAGNOSIS — N20.0 CALCULUS OF KIDNEY: ICD-10-CM

## 2022-08-24 DIAGNOSIS — M81.0 AGE-RELATED OSTEOPOROSIS WITHOUT CURRENT PATHOLOGICAL FRACTURE: ICD-10-CM

## 2022-08-24 DIAGNOSIS — N25.81 SECONDARY HYPERPARATHYROIDISM: ICD-10-CM

## 2022-08-24 DIAGNOSIS — N20.0 NEPHROLITHIASIS: Primary | ICD-10-CM

## 2022-08-24 PROCEDURE — 1160F PR REVIEW ALL MEDS BY PRESCRIBER/CLIN PHARMACIST DOCUMENTED: ICD-10-PCS | Mod: CPTII,95,, | Performed by: INTERNAL MEDICINE

## 2022-08-24 PROCEDURE — 1159F PR MEDICATION LIST DOCUMENTED IN MEDICAL RECORD: ICD-10-PCS | Mod: CPTII,95,, | Performed by: INTERNAL MEDICINE

## 2022-08-24 PROCEDURE — 1160F RVW MEDS BY RX/DR IN RCRD: CPT | Mod: CPTII,95,, | Performed by: INTERNAL MEDICINE

## 2022-08-24 PROCEDURE — 99204 PR OFFICE/OUTPT VISIT, NEW, LEVL IV, 45-59 MIN: ICD-10-PCS | Mod: 95,,, | Performed by: INTERNAL MEDICINE

## 2022-08-24 PROCEDURE — 1159F MED LIST DOCD IN RCRD: CPT | Mod: CPTII,95,, | Performed by: INTERNAL MEDICINE

## 2022-08-24 PROCEDURE — 99204 OFFICE O/P NEW MOD 45 MIN: CPT | Mod: 95,,, | Performed by: INTERNAL MEDICINE

## 2022-08-24 NOTE — PROGRESS NOTES
"Subjective:      Patient ID: Chaya Tam is a 57 y.o. female.    Chief Complaint:  No chief complaint on file.      History of Present Illness    Ms. Tam is a 57 year old woman with nephrolithiasis and osteoporosis who is here for evaluation of hyperparathyroidism. "  In 2019 was seen by endocrinologist for hyperparathyroidism. Treated with "asif doses" of vitamin D between 2019 and 2022 and PTH levels decreased.     Regarding her kidney stones:  Kidney stones --> calcium containing stones  First episode in 2017, single episode   Presented with back pain and blood in the urine   Underwent stent placement and eventually had surgery to remove stone   Has second stone in the kidney that has not caused any clinical symptoms.      Supplementation:   Calcium: Oscal + D one in th morning and one at night   Takes MVI daily   No vitamin D supplement     Does not use HCTZ.   Follows with urology     Osteoporosis diagnosed in 2018.   Current treatment: none   Previous medication use and side effects: used fosamax for two years (2019 - 2021) --> denies any side effects taking medicine   Falls: denies   Fractures:  Denies     DXA: last month    Dental visits: up to date     Diet:   Calcium intake: spinach, cheese, almond or soy milk     Exercise: occasionally   Balance: denies trips or slips     Menopause: early 40s  HRT history: uses bioidentical compounded gel (e/t) and p as an oral medicine     Glucocorticoid History: denies   PPI: denies (TUMS once a month)  Diabetes medicines: denies   Anti-seizure medication: denies     Family history of osteoporosis or other bone disease: older sister.   brother and father with kidney stones   Family history of fractures:  Denies       Review of Systems  No recent illness    Objective:   Physical Exam  There were no vitals filed for this visit.    BP Readings from Last 3 Encounters:   02/18/22 129/84   01/13/22 (!) 144/93   11/10/21 (!) 158/84     Wt Readings from Last 1 Encounters: "   02/18/22 1032 68 kg (150 lb)         There is no height or weight on file to calculate BMI.    Lab Review:   No results found for: HGBA1C  No results found for: CHOL, HDL, LDLCALC, TRIG, CHOLHDL  No results found for: NA, K, CL, CO2, GLU, BUN, CREATININE, CALCIUM, PROT, ALBUMIN, BILITOT, ALKPHOS, AST, ALT, ANIONGAP, ESTGFRAFRICA, EGFRNONAA, TSH                     Assessment and Plan     Secondary hyperparathyroidism  Labs consistent with secondary hyperparathyroidism   Despite sufficient vitamin D levels PTH normalized with serum calcium levels above the reference range on more than one occasion (2019 and 2022)  Secondary disease:  History of low dietary calcium versus hypercalciuria (calcium contaiing stones and family history) versus alendronate use    Plan:  24 hr urine collection for ca/sodium, creat  Continue with MVI  Dietary calcium   Add vitamin D supplementation (2000 IUs daily)       Age-related osteoporosis without current pathological fracture  Risk factors: early menopause, family history,  race,   Possibly idiopathic hypercalciuria     Currently on MHT  Check fasting CTX  Review dxa    Calculus of kidney  Consider HCTZ  24 hr urine collection       The patient location is: home/[place]   The chief complaint leading to consultation is: osteo/secondary hyper/stones    Visit type: audiovisual    Face to Face time with patient: 30 minutes of total time spent on the encounter, which includes face to face time and non-face to face time preparing to see the patient (eg, review of tests), Obtaining and/or reviewing separately obtained history, Documenting clinical information in the electronic or other health record, Independently interpreting results (not separately reported) and communicating results to the patient/family/caregiver, or Care coordination (not separately reported).         Each patient to whom he or she provides medical services by telemedicine is:  (1) informed of the relationship  between the physician and patient and the respective role of any other health care provider with respect to management of the patient; and (2) notified that he or she may decline to receive medical services by telemedicine and may withdraw from such care at any time.    Notes:

## 2022-08-24 NOTE — PATIENT INSTRUCTIONS
Fasting blood work and 24 hr urine collection for calcium, sodium    HOW TO COLLECT AN ACCURATE   24 HOUR URINE SPECIMEN     I want you to collect EVERY drop of your urine during a 24 hour period. I do not care what the volume of the urine is as long as it represents every drop that you pass during that 24 hour period. If you need to have a bowel movement, you may separate the urine but I want every drop.     Begin the collection on a morning which is convenient for you. At that time, pass your urine, flush it down the toilet and note the exact time. Now you have an empty bladder and empty bottle. That starts the collection. Collect every drop all during the day and night until exactly the same time the next morning, when you should pass your urine and add it to the bottle.     Bring the closed container back to the same laboratory that issued the empty container.     Thing to do:   1) drink plenty of fluids, your urine should be light colored.   2) take a multivitamin daily, but avoid other calcium supplements. Calcium should preferably come from your diet, such as dark leafy greens, broccoli, or dairy.   3) take a daily vitamin D supplement, 2000 IUs daily  4) send the bone density   5) let's repeat your levels in 6 - 12 months        Estimated Calcium Content of Foods:  Produce  Serving Size Estimated Calcium*    [place] greens, frozen 8 oz 360 mg   Broccoli annamarie 8 oz 200 mg   Kale, frozen 8 oz 180 mg   Soy Beans, green, boiled 8 oz 175 mg   Bok Fidel, cooked, boiled 8 oz 160 mg   Figs, dried 2 figs 65 mg   Broccoli, fresh, cooked 8 oz 60 mg   Oranges 1 whole 55 mg   Seafood Serving Size Estimated Calcium*    Sardines, canned with bones 3 oz 325 mg   [place], canned with bones 3 oz 180 mg   Shrimp, canned 3 oz 125 mg   Dairy Serving Size Estimated Calcium*    Ricotta, part-skim 4 oz 335 mg   Yogurt, plain, low-fat 6 oz 310 mg   Milk, skim, low-fat, whole 8 oz 300 mg   Yogurt with fruit, low-fat 6 oz 260 mg    Mozzarella, part-skim 1 oz 210 mg   Cheddar 1 oz 205 mg   Yogurt, Greek 6 oz 200 mg   American Cheese 1 oz 195 mg   Feta Cheese 4 oz 140 mg   Cottage Cheese, 2% 4 oz 105 mg   Frozen yogurt, vanilla 8 oz 105 mg   Ice Cream, vanilla 8 oz 85 mg   Parmesan 1 tbsp 55 mg   Fortified Food Serving Size Estimated Calcium*   [place] milk, rice milk or soy milk, fortified 8 oz 300 mg   Orange juice and other fruit juices, fortified 8 oz 300 mg   Tofu, prepared with calcium 4 oz 205 mg   Waffle, frozen, fortified 2 pieces 200 mg   Oatmeal, fortified 1 packet 140 mg   English muffin, fortified 1 muffin 100 mg   Cereal, fortified 8 oz 100-1,000 mg   Other Serving Size Estimated Calcium*   Mac & cheese, frozen 1 package 325 mg   Pizza, cheese, frozen 1 serving 115 mg   Pudding, chocolate, prepared with 2% milk 4 oz 160 mg   Beans, baked, canned 4 oz 160 mg   *The calcium content listed for most foods is estimated and can vary due to multiple factors. Check the food label to determine how much calcium is in a particular product.  If you read the nutrition label for a food source, it lists the % calcium in that food.  For an 8 oz glass of milk, for example, the label states calcium 30%.  This is equivalent to 300 mg of calcium (multiply the listed number by 10).   **Table from the National Osteopor[employer] Foundation

## 2022-08-25 PROBLEM — M81.0 AGE-RELATED OSTEOPOROSIS WITHOUT CURRENT PATHOLOGICAL FRACTURE: Status: ACTIVE | Noted: 2022-08-25

## 2022-08-25 PROBLEM — N25.81 SECONDARY HYPERPARATHYROIDISM: Status: ACTIVE | Noted: 2022-08-25

## 2022-08-25 NOTE — ASSESSMENT & PLAN NOTE
Labs consistent with secondary hyperparathyroidism   Despite sufficient vitamin D levels PTH normalized with serum calcium levels above the reference range on more than one occasion (2019 and 2022)  Secondary disease:  History of low dietary calcium versus hypercalciuria (calcium contaiing stones and family history) versus alendronate use    Plan:  24 hr urine collection for ca/sodium, creat  Continue with MVI  Dietary calcium   Add vitamin D supplementation (2000 IUs daily)

## 2022-08-25 NOTE — ASSESSMENT & PLAN NOTE
Risk factors: early menopause, family history,  race,   Possibly idiopathic hypercalciuria     Currently on MHT  Check fasting CTX  Review dxa

## 2022-10-06 ENCOUNTER — PATIENT MESSAGE (OUTPATIENT)
Dept: RESEARCH | Facility: HOSPITAL | Age: 57
End: 2022-10-06
Payer: COMMERCIAL

## 2022-10-12 ENCOUNTER — OFFICE VISIT (OUTPATIENT)
Dept: PRIMARY CARE CLINIC | Facility: CLINIC | Age: 57
End: 2022-10-12
Payer: COMMERCIAL

## 2022-10-12 ENCOUNTER — LAB VISIT (OUTPATIENT)
Dept: LAB | Facility: HOSPITAL | Age: 57
End: 2022-10-12
Attending: INTERNAL MEDICINE
Payer: COMMERCIAL

## 2022-10-12 VITALS
HEIGHT: 66 IN | WEIGHT: 156.19 LBS | HEART RATE: 66 BPM | OXYGEN SATURATION: 98 % | RESPIRATION RATE: 18 BRPM | BODY MASS INDEX: 25.1 KG/M2 | TEMPERATURE: 99 F | SYSTOLIC BLOOD PRESSURE: 124 MMHG | DIASTOLIC BLOOD PRESSURE: 82 MMHG

## 2022-10-12 DIAGNOSIS — B00.1 HERPES LABIALIS: ICD-10-CM

## 2022-10-12 DIAGNOSIS — R00.2 PALPITATIONS: ICD-10-CM

## 2022-10-12 DIAGNOSIS — R00.2 PALPITATION: ICD-10-CM

## 2022-10-12 DIAGNOSIS — R03.0 ELEVATED BP WITHOUT DIAGNOSIS OF HYPERTENSION: ICD-10-CM

## 2022-10-12 DIAGNOSIS — M81.0 AGE-RELATED OSTEOPOROSIS WITHOUT CURRENT PATHOLOGICAL FRACTURE: ICD-10-CM

## 2022-10-12 DIAGNOSIS — K59.01 SLOW TRANSIT CONSTIPATION: ICD-10-CM

## 2022-10-12 DIAGNOSIS — N20.0 CALCULUS OF KIDNEY: ICD-10-CM

## 2022-10-12 DIAGNOSIS — Z00.00 PREVENTATIVE HEALTH CARE: Primary | ICD-10-CM

## 2022-10-12 DIAGNOSIS — F98.8 ATTENTION DEFICIT DISORDER (ADD) WITHOUT HYPERACTIVITY: ICD-10-CM

## 2022-10-12 DIAGNOSIS — Z00.00 PREVENTATIVE HEALTH CARE: ICD-10-CM

## 2022-10-12 DIAGNOSIS — N25.81 SECONDARY HYPERPARATHYROIDISM: ICD-10-CM

## 2022-10-12 DIAGNOSIS — M54.16 LUMBAR RADICULOPATHY, CHRONIC: ICD-10-CM

## 2022-10-12 LAB
BASOPHILS # BLD AUTO: 0.03 K/UL (ref 0–0.2)
BASOPHILS NFR BLD: 0.5 % (ref 0–1.9)
CHOLEST SERPL-MCNC: 191 MG/DL (ref 120–199)
CHOLEST/HDLC SERPL: 3.5 {RATIO} (ref 2–5)
DIFFERENTIAL METHOD: ABNORMAL
EOSINOPHIL # BLD AUTO: 0.2 K/UL (ref 0–0.5)
EOSINOPHIL NFR BLD: 2.7 % (ref 0–8)
ERYTHROCYTE [DISTWIDTH] IN BLOOD BY AUTOMATED COUNT: 11.9 % (ref 11.5–14.5)
ESTIMATED AVG GLUCOSE: 103 MG/DL (ref 68–131)
HBA1C MFR BLD: 5.2 % (ref 4–5.6)
HCT VFR BLD AUTO: 45.1 % (ref 37–48.5)
HCV AB SERPL QL IA: NORMAL
HDLC SERPL-MCNC: 54 MG/DL (ref 40–75)
HDLC SERPL: 28.3 % (ref 20–50)
HGB BLD-MCNC: 14.4 G/DL (ref 12–16)
IMM GRANULOCYTES # BLD AUTO: 0.01 K/UL (ref 0–0.04)
IMM GRANULOCYTES NFR BLD AUTO: 0.2 % (ref 0–0.5)
LDLC SERPL CALC-MCNC: 115.8 MG/DL (ref 63–159)
LYMPHOCYTES # BLD AUTO: 1.8 K/UL (ref 1–4.8)
LYMPHOCYTES NFR BLD: 28.6 % (ref 18–48)
MAGNESIUM SERPL-MCNC: 2.2 MG/DL (ref 1.6–2.6)
MCH RBC QN AUTO: 29.8 PG (ref 27–31)
MCHC RBC AUTO-ENTMCNC: 31.9 G/DL (ref 32–36)
MCV RBC AUTO: 93 FL (ref 82–98)
MONOCYTES # BLD AUTO: 0.6 K/UL (ref 0.3–1)
MONOCYTES NFR BLD: 9.1 % (ref 4–15)
NEUTROPHILS # BLD AUTO: 3.8 K/UL (ref 1.8–7.7)
NEUTROPHILS NFR BLD: 58.9 % (ref 38–73)
NONHDLC SERPL-MCNC: 137 MG/DL
NRBC BLD-RTO: 0 /100 WBC
PLATELET # BLD AUTO: 264 K/UL (ref 150–450)
PMV BLD AUTO: 10.3 FL (ref 9.2–12.9)
RBC # BLD AUTO: 4.84 M/UL (ref 4–5.4)
TRIGL SERPL-MCNC: 106 MG/DL (ref 30–150)
TSH SERPL DL<=0.005 MIU/L-ACNC: 1.79 UIU/ML (ref 0.4–4)
WBC # BLD AUTO: 6.39 K/UL (ref 3.9–12.7)

## 2022-10-12 PROCEDURE — 99999 PR PBB SHADOW E&M-EST. PATIENT-LVL V: CPT | Mod: PBBFAC,,, | Performed by: INTERNAL MEDICINE

## 2022-10-12 PROCEDURE — 3044F HG A1C LEVEL LT 7.0%: CPT | Mod: CPTII,S$GLB,, | Performed by: INTERNAL MEDICINE

## 2022-10-12 PROCEDURE — 3044F PR MOST RECENT HEMOGLOBIN A1C LEVEL <7.0%: ICD-10-PCS | Mod: CPTII,S$GLB,, | Performed by: INTERNAL MEDICINE

## 2022-10-12 PROCEDURE — 36415 COLL VENOUS BLD VENIPUNCTURE: CPT | Mod: PN | Performed by: INTERNAL MEDICINE

## 2022-10-12 PROCEDURE — 85025 COMPLETE CBC W/AUTO DIFF WBC: CPT | Performed by: INTERNAL MEDICINE

## 2022-10-12 PROCEDURE — 90471 IMMUNIZATION ADMIN: CPT | Mod: S$GLB,,, | Performed by: INTERNAL MEDICINE

## 2022-10-12 PROCEDURE — 83735 ASSAY OF MAGNESIUM: CPT | Performed by: INTERNAL MEDICINE

## 2022-10-12 PROCEDURE — 80061 LIPID PANEL: CPT | Performed by: INTERNAL MEDICINE

## 2022-10-12 PROCEDURE — 99215 PR OFFICE/OUTPT VISIT, EST, LEVL V, 40-54 MIN: ICD-10-PCS | Mod: 25,S$GLB,, | Performed by: INTERNAL MEDICINE

## 2022-10-12 PROCEDURE — 93010 ELECTROCARDIOGRAM REPORT: CPT | Mod: S$GLB,,, | Performed by: INTERNAL MEDICINE

## 2022-10-12 PROCEDURE — 83036 HEMOGLOBIN GLYCOSYLATED A1C: CPT | Performed by: INTERNAL MEDICINE

## 2022-10-12 PROCEDURE — 90471 FLU VACCINE (QUAD) GREATER THAN OR EQUAL TO 3YO PRESERVATIVE FREE IM: ICD-10-PCS | Mod: S$GLB,,, | Performed by: INTERNAL MEDICINE

## 2022-10-12 PROCEDURE — 90686 FLU VACCINE (QUAD) GREATER THAN OR EQUAL TO 3YO PRESERVATIVE FREE IM: ICD-10-PCS | Mod: S$GLB,,, | Performed by: INTERNAL MEDICINE

## 2022-10-12 PROCEDURE — 86803 HEPATITIS C AB TEST: CPT | Performed by: INTERNAL MEDICINE

## 2022-10-12 PROCEDURE — 3074F SYST BP LT 130 MM HG: CPT | Mod: CPTII,S$GLB,, | Performed by: INTERNAL MEDICINE

## 2022-10-12 PROCEDURE — 1159F MED LIST DOCD IN RCRD: CPT | Mod: CPTII,S$GLB,, | Performed by: INTERNAL MEDICINE

## 2022-10-12 PROCEDURE — 3079F PR MOST RECENT DIASTOLIC BLOOD PRESSURE 80-89 MM HG: ICD-10-PCS | Mod: CPTII,S$GLB,, | Performed by: INTERNAL MEDICINE

## 2022-10-12 PROCEDURE — 3074F PR MOST RECENT SYSTOLIC BLOOD PRESSURE < 130 MM HG: ICD-10-PCS | Mod: CPTII,S$GLB,, | Performed by: INTERNAL MEDICINE

## 2022-10-12 PROCEDURE — 93005 ELECTROCARDIOGRAM TRACING: CPT | Mod: S$GLB,,, | Performed by: INTERNAL MEDICINE

## 2022-10-12 PROCEDURE — 99215 OFFICE O/P EST HI 40 MIN: CPT | Mod: 25,S$GLB,, | Performed by: INTERNAL MEDICINE

## 2022-10-12 PROCEDURE — 93005 EKG 12-LEAD: ICD-10-PCS | Mod: S$GLB,,, | Performed by: INTERNAL MEDICINE

## 2022-10-12 PROCEDURE — 99999 PR PBB SHADOW E&M-EST. PATIENT-LVL V: ICD-10-PCS | Mod: PBBFAC,,, | Performed by: INTERNAL MEDICINE

## 2022-10-12 PROCEDURE — 84443 ASSAY THYROID STIM HORMONE: CPT | Performed by: INTERNAL MEDICINE

## 2022-10-12 PROCEDURE — 1159F PR MEDICATION LIST DOCUMENTED IN MEDICAL RECORD: ICD-10-PCS | Mod: CPTII,S$GLB,, | Performed by: INTERNAL MEDICINE

## 2022-10-12 PROCEDURE — 90686 IIV4 VACC NO PRSV 0.5 ML IM: CPT | Mod: S$GLB,,, | Performed by: INTERNAL MEDICINE

## 2022-10-12 PROCEDURE — 3079F DIAST BP 80-89 MM HG: CPT | Mod: CPTII,S$GLB,, | Performed by: INTERNAL MEDICINE

## 2022-10-12 PROCEDURE — 93010 EKG 12-LEAD: ICD-10-PCS | Mod: S$GLB,,, | Performed by: INTERNAL MEDICINE

## 2022-10-12 RX ORDER — KRILL/OM-3/DHA/EPA/PHOSPHO/AST 1000-230MG
CAPSULE ORAL
COMMUNITY
Start: 2021-11-01

## 2022-10-12 RX ORDER — ACETAMINOPHEN 500 MG
TABLET ORAL
COMMUNITY
Start: 2019-01-01

## 2022-10-12 RX ORDER — CYANOCOBALAMIN (VITAMIN B-12) 500 MCG
TABLET ORAL
COMMUNITY
Start: 2022-10-01

## 2022-10-12 RX ORDER — AA/PROT/LYSINE/METHIO/VIT C/B6 50-12.5 MG
TABLET ORAL
COMMUNITY
Start: 2022-04-01

## 2022-10-12 RX ORDER — METHOCARBAMOL 500 MG/1
500 TABLET, FILM COATED ORAL 2 TIMES DAILY
COMMUNITY
Start: 2022-10-04

## 2022-10-12 NOTE — ASSESSMENT & PLAN NOTE
-Check Bps at home weekly and prn symptoms for goal BP <130/80.  -cont healthy diet high in fiber, low fat dairy, lean protein, low in saturated/trans fat; high in Ca/Mag/K, 1.5 gm  Na diet; low in processed sugars and foods, ie no white sugars, bread, pasta, rice.   -Moderate exercise 30 min 5 times per week or 75 min intense exercise - consider yoga at [employer]  -reduce alcohol to 1-2 glasses per day  - no NSAIDs

## 2022-10-12 NOTE — ASSESSMENT & PLAN NOTE
-keep appt with Endo to possibly start bisphosphonates again since pt tolerated it in the past  -cont Vit D 2000 units daily and increase weight-bearing exercise

## 2022-10-12 NOTE — PROGRESS NOTES
[facility] Progress Note      Chief Complaint     Establish Care   for Elevated BPs    Subjective:      History of Present Illness:  Chaya Tam is a 57 y.o. female here to establish care/elevated Bps.    Elevated Bps 140/90s with HR 90s by wrist BP and palpitations for the past few months. Pt has gained 9 lbs since 10/2021 primarily due to lack of exercise since COVID and injured right knee which has since resolved. 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   Walks her dogs and is active in yard but recently started walking approx daily  Prior to COVID:  HIIT; s/p right knee arthroscopy in 2019 now with DJD of LS L3-L4 requiring epidural in 3/2022 with chronic pain aggravated by sitting and standing, lying down by Dr. Nunez; pain 4/10 today up to 7/10 with next injection this Friday.     Secondary Hyperparathyroidism: followed by Dr. Nahomi Olivares; no longer on Ca supplements only vit D 2000 units daily; needs 24 urine Ca/Na/creat and MVI daily; :  Ca 10.3, PTH 15, Vit D 81     Osteoporosis LS T-2.5 (-2.8 in 2018): had taken fosamax x 1 year and then stopped in Summer 2020. Taking Vit D 2000 units daily but no longer on Caltrate.  Vit D level 81 in 2021    ADD: on Vyvanse 30mg daily managed by psychiatrist (decreased from 40mg to 30mg) - feels less anxious     Right Calcium Nephrolithiasis:  2017 dx'ed by Dr. Coffey with stent then removal and now with persistent in right lower kidney. Drinking 1 liter of sparkling water     Herpes Labialis: uses Valtrex prn; now has a upper lip     Palpitations/Constipation: resolved with Mag 500mg qhs with complete resolution    PMH: dad HTN  at 91 y/o from viral encephalitis; Mom had CVA amyloidosis dx'ed at 77 y/o  in 80s.  Younger sister with HTN on 3 BP meds; older brother is preDM.     SH: no tobacco; wine/beer/bourbon - decreased from daily to weekend approx 1 month ago. No drugs;  but  lives with significant other and is very happy; all of her adult kids are doing well.     Menopause: 44 years then went on hormones approx 6 years on HRT on compounded estrogen/progesterone formulation to minimize BTB by Dr. Lopez.        Past Medical History:  Past Medical History:   Diagnosis Date    Kidney stones        Past Surgical History:  Past Surgical History:   Procedure Laterality Date    ARTHROSCOPY OF KNEE Left 1/18/2019    Procedure: ARTHROSCOPY, KNEE;  Surgeon: Jacek Silveira MD;  Location: [facility] OR;  Service: Orthopedics;  Laterality: Left;    COLONOSCOPY N/A 7/30/2020    Procedure: COLONOSCOPY;  Surgeon: Eddie Leigh MD;  Location: [facility] ENDO (4TH FLR);  Service: Endoscopy;  Laterality: N/A;  covid-7/27/20-[facility]-2nd floor-BB  instructions emailed to patient-BB    DILATION AND CURETTAGE OF UTERUS      EXCISION OF MEDIAL MENISCUS OF KNEE Left 1/18/2019    Procedure: MENISCECTOMY, KNEE, MEDIAL;  Surgeon: Jacek Silveira MD;  Location: [facility];  Service: Orthopedics;  Laterality: Left;    kidney stones         Allergies:  Review of patient's allergies indicates:   Allergen Reactions    Bactrim [sulfamethoxazole-trimethoprim] Rash       Home Medications:  Current Outpatient Medications   Medication Sig Dispense Refill    AA-prot-lysine-methio-vit C-B6 (A/G PRO) 50-12.5-16.7 mg Tab       cholecalciferol, vitamin D3, (VITAMIN D3) 10 mcg (400 unit) Tab       cyclobenzaprine (FLEXERIL) 10 MG tablet Take 10 mg by mouth nightly as needed.      fluticasone propionate (FLONASE) 50 mcg/actuation nasal spray 1 spray by Each Nostril route 2 (two) times daily.      bpcqu-bh-3-dha-epa-phospho-ast 1,000-230-60 mg Cap       Lactobacillus acidophilus (PROBIOTIC) 10 billion cell Cap       magnesium oxide 500 mg Cap       methocarbamoL (ROBAXIN) 500 MG Tab Take 500 mg by mouth 2 (two) times daily.      multivitamin with iron Tab Take by mouth.      progesterone (PROMETRIUM) 200 MG capsule Take 1 capsule (200 mg  total) by mouth nightly. 90 capsule 3    traZODone (DESYREL) 50 MG tablet       UNABLE TO FIND Apply 0.5 g topically every evening. Estradiol 2mg/g: Testosterone 1%(50mg/5g)  (1mg estradiol/ 5mg Test) nightly(1/2 gram nightly or two clicks nightly) 30 g 5    valACYclovir (VALTREX) 1000 MG tablet TK 1 T PO BID PRN  3    VYVANSE 30 mg capsule        No current facility-administered medications for this visit.     Facility-Administered Medications Ordered in Other Visits   Medication Dose Route Frequency Provider Last Rate Last Admin    0.9%  NaCl infusion   Intravenous Continuous Jacek Silveira MD   New Bag at 07/30/20 0828    0.9%  NaCl infusion   Intravenous Continuous Jacek Silveira MD            Family History:  Family History   Problem Relation Age of Onset    Stroke Mother 76    Hypertension Father     Breast cancer Sister 48    Heart attack Neg Hx     Heart disease Neg Hx     Colon cancer Neg Hx     Ovarian cancer Neg Hx     Diabetes Neg Hx        Social History:  Social History     Tobacco Use    Smoking status: Never    Smokeless tobacco: Never   Substance Use Topics    Alcohol use: Yes     Alcohol/week: 6.0 standard drinks     Types: 3 Glasses of wine, 3 Cans of beer per week     Comment: socially    Drug use: No       Review of Systems:  Pertinent positives and negatives listed in HPI. All other systems are reviewed and are negative.    Health Maintaince :   Health Maintenance Topics with due status: Not Due       Topic Last Completion Date    Colorectal Cancer Screening 07/30/2020    Cervical Cancer Screening 08/18/2020    TETANUS VACCINE 08/06/2021    Mammogram 02/18/2022    Lipid Panel 10/12/2022           Eye: UTD  Dental:  UTD    Immunizations:   Tdap: 8/2021  Influenza: needs flu shot.  Pneumovax: N/A  Shingrex x 2: Needs   COVID: x 3; COVID x 2; needs booster  Hepatitis C:   Cancer Screening:  PAP: UTD 8/2020 WNL  Mammogram: UTD   Colonoscopy: UTD July 2020 WNL   DEXA:  7/2022 with LS -2.5  "(-2.8 in 2018); left femoral neck T-1.6; right femoral neck T-2.0      The 10-year ASCVD risk score (Colt LUONG, et al., 2019) is: 2.2%    Values used to calculate the score:      Age: 57 years      Sex: Female      Is Non- : No      Diabetic: No      Tobacco smoker: No      Systolic Blood Pressure: 124 mmHg      Is BP treated: No      HDL Cholesterol: 54 mg/dL      Total Cholesterol: 191 mg/dL      Objective:   /82 (BP Location: Right arm, Patient Position: Sitting, BP Method: Medium (Manual))   Pulse 66   Temp 98.7 °F (37.1 °C) (Oral)   Resp 18   Ht 5' 6" (1.676 m)   Wt 70.9 kg (156 lb 3.2 oz)   LMP 08/18/2010 (Approximate)   SpO2 98%   BMI 25.21 kg/m²      Body mass index is 25.21 kg/m².       Physical Examination:  General: Alert and awake in no apparent distress  HEENT: Normocephalic and atraumatic; Tms WNL  Eyes:  PERRL; EOMi with anicteric sclera and clear conjunctivae  Mouth:  Oropharynx clear and without exudate; moist mucous membranes  Neck:   Cervical nodes not enlarged; supple; no bruits  Cardio:  Regular rate and rhythm with normal S1 and S2; no murmurs or rubs  Resp:  CTAB; respirations unlabored; no wheezes, crackles or rhonchi  Abdom: Soft, NTND with normoactive bowel sounds; negative HSM  Extrem: Warm and well-perfused with no clubbing, cyanosis or edema  Skin:  No rashes, lesions, or color changes  Pulses:  2+ and symmetric distally  Neuro:  AAOx3; cooperative and pleasant with no focal deficits    Laboratory:      Most Recent Data:  Lab Results   Component Value Date    WBC 6.39 10/12/2022    HGB 14.4 10/12/2022    HCT 45.1 10/12/2022     10/12/2022    CHOL 191 10/12/2022    TRIG 106 10/12/2022    HDL 54 10/12/2022    TSH 1.791 10/12/2022    HGBA1C 5.2 10/12/2022              CBC:   WBC   Date Value Ref Range Status   10/12/2022 6.39 3.90 - 12.70 K/uL Final     Hemoglobin   Date Value Ref Range Status   10/12/2022 14.4 12.0 - 16.0 g/dL Final "     Hematocrit   Date Value Ref Range Status   10/12/2022 45.1 37.0 - 48.5 % Final     Platelets   Date Value Ref Range Status   10/12/2022 264 150 - 450 K/uL Final     MCV   Date Value Ref Range Status   10/12/2022 93 82 - 98 fL Final     RDW   Date Value Ref Range Status   10/12/2022 11.9 11.5 - 14.5 % Final     BMP:   Magnesium   Date Value Ref Range Status   10/12/2022 2.2 1.6 - 2.6 mg/dL Final     LFTs: No results found for: PROT, ALBUMIN, BILITOT, AST, ALKPHOS, ALT, GGT  Coags: No results found for: INR, PROTIME, PTT  FLP:      Lab Results   Component Value Date    CHOL 191 10/12/2022     Lab Results   Component Value Date    HDL 54 10/12/2022     Lab Results   Component Value Date    LDLCALC 115.8 10/12/2022     Lab Results   Component Value Date    TRIG 106 10/12/2022     Lab Results   Component Value Date    CHOLHDL 28.3 10/12/2022      DM:      Hemoglobin A1C   Date Value Ref Range Status   10/12/2022 5.2 4.0 - 5.6 % Final     Comment:     ADA Screening Guidelines:  5.7-6.4%  Consistent with prediabetes  >or=6.5%  Consistent with diabetes    High levels of fetal hemoglobin interfere with the HbA1C  assay. Heterozygous hemoglobin variants (HbS, HgC, etc)do  not significantly interfere with this assay.   However, presence of multiple variants may affect accuracy.       LDL Cholesterol   Date Value Ref Range Status   10/12/2022 115.8 63.0 - 159.0 mg/dL Final     Comment:     The National Cholesterol Education Program (NCEP) has set the  following guidelines (reference values) for LDL Cholesterol:  Optimal.......................<130 mg/dL  Borderline High...............130-159 mg/dL  High..........................160-189 mg/dL  Very High.....................>190 mg/dL       Thyroid:   TSH   Date Value Ref Range Status   10/12/2022 1.791 0.400 - 4.000 uIU/mL Final     Anemia: No results found for: IRON, TIBC, FERRITIN, [id number], FOLATE  Cardiac: No results found for: TROPONINI, CKTOTAL, CKMB, BNP  Urinalysis:  No results found for: LABURIN, COLORU, PHUA, CLARITYU, SPECGRAV, LABSPEC, NITRITE, PROTEINUR, GLUCOSEU, KETONESU, UROBILINOGEN, BILIRUBINUR, BLOODU, RBCU, WBCUA    Other Laboratory Data:      Other Results:  EKG (my interpretation):     Radiology:  Mammo Digital Diagnostic Left with Oswaldo, US Breast Left Limited  Narrative: Result:  Mammo Digital Diagnostic Left with Oswaldo  US Breast Left Limited    History:  Patient is 56 y.o. and is seen for left breast UOQ thickening     Films Compared:  Compared to: 10/01/2021 Mammo Digital Screening Bilat w/ Oswaldo, 03/10/2020   US Breast Left Limited, and 03/10/2020 Mammo Digital Diagnostic Left w/   Oswaldo    Findings:  Diagnostic Mammogram:  Computer-aided detection was utilized in the interpretation of this   examination. This procedure was performed using tomosynthesis.       The left breast has scattered areas of fibroglandular density. There is no   evidence of suspicious masses, microcalcifications or architectural   distortion.     Breast Ultrasound:  Limited Breast ultrasound was performed at the area of concern indicated   by the patient and the left axilla. Ultrasound evaluation demonstrates no   suspicious abnormality.   No axillary adenopathy is seen.  Impression: There is no mammographic or sonographic evidence of malignancy.    BI-RADS Category 1: Negative    Recommendation:  Return to annual screening mammogram schedule is recommended due in   October.     Your estimated lifetime risk of breast cancer (to age 85) based on   Tyrer-Cuzick risk assessment model is Tyrer-Cuzick: 11.89 %. According to   the American Cancer Society, patients with a lifetime breast cancer risk   of 20% or higher might benefit from supplemental screening tests.          Assessment/Plan     Chaya Tam is a 57 y.o. female with:  1. Preventative health care  -     Hepatitis C Antibody; Future; Expected date: 10/12/2022  -     Influenza - Quadrivalent (PF)    2. Elevated BP without  diagnosis of hypertension  Assessment & Plan:  -Check Bps at home weekly and prn symptoms for goal BP <130/80.  -cont healthy diet high in fiber, low fat dairy, lean protein, low in saturated/trans fat; high in Ca/Mag/K, 1.5 gm  Na diet; low in processed sugars and foods, ie no white sugars, bread, pasta, rice.   -Moderate exercise 30 min 5 times per week or 75 min intense exercise - consider yoga at [employer]  -reduce alcohol to 1-2 glasses per day  - no NSAIDs      Orders:  -     Lipid Panel; Future; Expected date: 10/12/2022  -     Cancel: Urinalysis; Future; Expected date: 10/12/2022  -     Cancel: Microalbumin/Creatinine Ratio, Urine; Future; Expected date: 10/12/2022  -     Hemoglobin A1C; Future; Expected date: 10/12/2022  -     CBC W/ AUTO DIFFERENTIAL; Future; Expected date: 10/12/2022  -     Magnesium; Future; Expected date: 10/12/2022    3. Palpitation    4. Palpitations  Assessment & Plan:  EKG today  -start yoga breathing/meditation  -check heart rate on own with normal 66   -cont Mag 500mg po daily and check Mag level    Orders:  -     IN OFFICE EKG 12-LEAD (to Muse)  -     TSH; Future; Expected date: 10/12/2022    5. Age-related osteoporosis without current pathological fracture  Assessment & Plan:  -keep appt with Endo to possibly start bisphosphonates again since pt tolerated it in the past  -cont Vit D 2000 units daily and increase weight-bearing exercise      6. Calculus of kidney  Assessment & Plan:  -cont follow-up with Dr. Coffey   -hydrate with 1-2 liters of fluids daily      7. Lumbar radiculopathy, chronic  Assessment & Plan:  -keep appt with Dr. Nunez this week for 2nd epidural   -cont Robaxin 500mg po bid prn      8. Slow transit constipation  Assessment & Plan:  -drink 1 liter of water daily  -cont Mag 500mg po daily      9. Herpes labialis  Assessment & Plan:  -cont Valtrex 1mg bid prn      10. Attention deficit disorder (ADD) without hyperactivity  Assessment & Plan:  Cont  Vyvanse 30mg daily as per psychiatrist      11. Secondary hyperparathyroidism  Assessment & Plan:  -cont evaluation and treatment by Dr. Nahomi Olivares including Vit D 2000 units daily without additional Calcium              I spent a total of 65 minutes on the day of the visit.This includes face to face time and non-face to face time preparing to see the patient (eg, review of tests), obtaining and/or reviewing separately obtained history, documenting clinical information in the electronic or other health record, independently interpreting results and communicating results to the patient/family/caregiver, or care coordinator.   Code Status:     Celeste Lopez MD

## 2022-10-12 NOTE — PATIENT INSTRUCTIONS
-Check Bps at home weekly and prn symptoms for goal BP <130/80.  -cont healthy diet high in fiber, low fat dairy, lean protein, low in saturated/trans fat; high in Ca/Mag/K, 1.5 gm  Na diet; low in processed sugars and foods, ie no white sugars, bread, pasta, rice.   -Moderate exercise 30 min 5 times per week or 75 min intense exercise  -reduce alcohol to 1-2 glasses per day  - no NSAIDs     [facility]    Routine labs today    Call [phone] to schedule to get COVID vaccine     Get Shingrex x 2    Send Dr. Lopez weekly Bps to [phone] with goal <130/80.    All of your core healthy metrics are met.

## 2022-10-12 NOTE — PROGRESS NOTES
Consent given to give flu shot, ID verified by name and , allergies reviewed and VIS Sheet given to patient, instructed to wait around for 15 minutes post administration.     Vaccine given tolerated well pt has no questions at this time.    SB

## 2022-10-12 NOTE — ASSESSMENT & PLAN NOTE
EKG today  -start yoga breathing/meditation  -check heart rate on own with normal 66   -cont Mag 500mg po daily and check Mag level

## 2022-10-12 NOTE — ASSESSMENT & PLAN NOTE
-cont evaluation and treatment by Dr. Nahomi Olivares including Vit D 2000 units daily without additional Calcium

## 2022-11-17 ENCOUNTER — PATIENT MESSAGE (OUTPATIENT)
Dept: PRIMARY CARE CLINIC | Facility: CLINIC | Age: 57
End: 2022-11-17
Payer: COMMERCIAL

## 2022-11-17 ENCOUNTER — OFFICE VISIT (OUTPATIENT)
Dept: URGENT CARE | Facility: CLINIC | Age: 57
End: 2022-11-17
Payer: COMMERCIAL

## 2022-11-17 VITALS
HEART RATE: 90 BPM | WEIGHT: 149 LBS | SYSTOLIC BLOOD PRESSURE: 154 MMHG | TEMPERATURE: 99 F | HEIGHT: 66 IN | OXYGEN SATURATION: 96 % | DIASTOLIC BLOOD PRESSURE: 94 MMHG | BODY MASS INDEX: 23.95 KG/M2 | RESPIRATION RATE: 16 BRPM

## 2022-11-17 DIAGNOSIS — J06.9 VIRAL URI: Primary | ICD-10-CM

## 2022-11-17 DIAGNOSIS — R53.83 FATIGUE, UNSPECIFIED TYPE: ICD-10-CM

## 2022-11-17 LAB
CTP QC/QA: YES
POC MOLECULAR INFLUENZA A AGN: NEGATIVE
POC MOLECULAR INFLUENZA B AGN: NEGATIVE

## 2022-11-17 PROCEDURE — 1159F PR MEDICATION LIST DOCUMENTED IN MEDICAL RECORD: ICD-10-PCS | Mod: CPTII,S$GLB,, | Performed by: NURSE PRACTITIONER

## 2022-11-17 PROCEDURE — 3080F DIAST BP >= 90 MM HG: CPT | Mod: CPTII,S$GLB,, | Performed by: NURSE PRACTITIONER

## 2022-11-17 PROCEDURE — 3044F HG A1C LEVEL LT 7.0%: CPT | Mod: CPTII,S$GLB,, | Performed by: NURSE PRACTITIONER

## 2022-11-17 PROCEDURE — 3008F PR BODY MASS INDEX (BMI) DOCUMENTED: ICD-10-PCS | Mod: CPTII,S$GLB,, | Performed by: NURSE PRACTITIONER

## 2022-11-17 PROCEDURE — 99213 PR OFFICE/OUTPT VISIT, EST, LEVL III, 20-29 MIN: ICD-10-PCS | Mod: S$GLB,,, | Performed by: NURSE PRACTITIONER

## 2022-11-17 PROCEDURE — 1159F MED LIST DOCD IN RCRD: CPT | Mod: CPTII,S$GLB,, | Performed by: NURSE PRACTITIONER

## 2022-11-17 PROCEDURE — 99213 OFFICE O/P EST LOW 20 MIN: CPT | Mod: S$GLB,,, | Performed by: NURSE PRACTITIONER

## 2022-11-17 PROCEDURE — 3080F PR MOST RECENT DIASTOLIC BLOOD PRESSURE >= 90 MM HG: ICD-10-PCS | Mod: CPTII,S$GLB,, | Performed by: NURSE PRACTITIONER

## 2022-11-17 PROCEDURE — 87502 INFLUENZA DNA AMP PROBE: CPT | Mod: QW,S$GLB,, | Performed by: NURSE PRACTITIONER

## 2022-11-17 PROCEDURE — 3044F PR MOST RECENT HEMOGLOBIN A1C LEVEL <7.0%: ICD-10-PCS | Mod: CPTII,S$GLB,, | Performed by: NURSE PRACTITIONER

## 2022-11-17 PROCEDURE — 1160F PR REVIEW ALL MEDS BY PRESCRIBER/CLIN PHARMACIST DOCUMENTED: ICD-10-PCS | Mod: CPTII,S$GLB,, | Performed by: NURSE PRACTITIONER

## 2022-11-17 PROCEDURE — 87502 POCT INFLUENZA A/B MOLECULAR: ICD-10-PCS | Mod: QW,S$GLB,, | Performed by: NURSE PRACTITIONER

## 2022-11-17 PROCEDURE — 3008F BODY MASS INDEX DOCD: CPT | Mod: CPTII,S$GLB,, | Performed by: NURSE PRACTITIONER

## 2022-11-17 PROCEDURE — 3077F PR MOST RECENT SYSTOLIC BLOOD PRESSURE >= 140 MM HG: ICD-10-PCS | Mod: CPTII,S$GLB,, | Performed by: NURSE PRACTITIONER

## 2022-11-17 PROCEDURE — 1160F RVW MEDS BY RX/DR IN RCRD: CPT | Mod: CPTII,S$GLB,, | Performed by: NURSE PRACTITIONER

## 2022-11-17 PROCEDURE — 3077F SYST BP >= 140 MM HG: CPT | Mod: CPTII,S$GLB,, | Performed by: NURSE PRACTITIONER

## 2022-11-17 NOTE — PROGRESS NOTES
"Subjective:       Patient ID: Chaya Tam is a 57 y.o. female.    Vitals:  height is 5' 6" (1.676 m) and weight is 67.6 kg (149 lb). Her temperature is 98.6 °F (37 °C). Her blood pressure is 154/94 (abnormal) and her pulse is 90. Her respiration is 16 and oxygen saturation is 96%.     Chief Complaint: Sore Throat    This is a 57 y.o. female who presents today with a chief complaint of  sore throat, chills, congestion, headaches, body aches, x fatigue x yesterday. Treated with Mucinex D and Ibuprofen.     Sore Throat   This is a new problem. The current episode started yesterday. The problem has been gradually worsening. Neither side of throat is experiencing more pain than the other. There has been no fever. The pain is at a severity of 0/10. The patient is experiencing no pain. Associated symptoms include congestion and headaches. Pertinent negatives include no abdominal pain, coughing, diarrhea, ear discharge, ear pain, neck pain, shortness of breath, trouble swallowing or vomiting. She has had no exposure to strep or mono. Treatments tried: Mucinex D and Ibuprofen. The treatment provided mild relief.     Constitution: Positive for fatigue, fever and generalized weakness. Negative for chills and sweating.   HENT:  Positive for congestion and sore throat. Negative for ear pain, ear discharge, tinnitus, hearing loss, sinus pain, sinus pressure, trouble swallowing and voice change.    Neck: Negative for neck pain and painful lymph nodes.   Cardiovascular:  Negative for chest pain, palpitations and sob on exertion.   Respiratory:  Negative for cough, sputum production, shortness of breath and wheezing.    Gastrointestinal:  Negative for abdominal pain, nausea, vomiting and diarrhea.   Musculoskeletal:  Negative for muscle ache.   Skin:  Negative for color change, pale and rash.   Allergic/Immunologic: Negative for sneezing.   Neurological:  Positive for headaches. Negative for numbness and tingling. "   Hematologic/Lymphatic: Negative for swollen lymph nodes.     Objective:      Physical Exam   Constitutional: She is oriented to person, place, and time. She appears well-developed. She is cooperative.  Non-toxic appearance. She does not appear ill. No distress.   HENT:   Head: Normocephalic and atraumatic.   Ears:   Right Ear: Hearing, tympanic membrane, external ear and ear canal normal.   Left Ear: Hearing, tympanic membrane, external ear and ear canal normal.   Nose: Congestion present. No mucosal edema, rhinorrhea or nasal deformity. No epistaxis. Right sinus exhibits no maxillary sinus tenderness and no frontal sinus tenderness. Left sinus exhibits no maxillary sinus tenderness and no frontal sinus tenderness.   Mouth/Throat: Uvula is midline and mucous membranes are normal. No trismus in the jaw. Normal dentition. No uvula swelling. Posterior oropharyngeal erythema present. No oropharyngeal exudate or posterior oropharyngeal edema.   Eyes: Conjunctivae and lids are normal. No scleral icterus.   Neck: Trachea normal and phonation normal. Neck supple. No edema present. No erythema present. No neck rigidity present.   Cardiovascular: Normal rate, regular rhythm and normal heart sounds.   Pulmonary/Chest: Effort normal and breath sounds normal. No stridor. No respiratory distress. She has no decreased breath sounds. She has no wheezes. She has no rhonchi. She has no rales. She exhibits no tenderness.   Abdominal: Normal appearance.   Musculoskeletal: Normal range of motion.         General: No deformity. Normal range of motion.      Cervical back: She exhibits no tenderness.   Lymphadenopathy:     She has cervical adenopathy.   Neurological: She is alert and oriented to person, place, and time. She exhibits normal muscle tone. Coordination normal.   Skin: Skin is warm, dry, intact, not diaphoretic and not pale.   Psychiatric: Her speech is normal and behavior is normal. Judgment and thought content normal.    Nursing note and vitals reviewed.      Results for orders placed or performed in visit on 11/17/22   POCT Influenza A/B MOLECULAR   Result Value Ref Range    POC Molecular Influenza A Ag Negative Negative, Not Reported    POC Molecular Influenza B Ag Negative Negative, Not Reported     Acceptable Yes        Assessment:       1. Viral URI    2. Fatigue, unspecified type          Plan:         Viral URI    Fatigue, unspecified type  -     POCT Influenza A/B MOLECULAR                 Patient Instructions   See additional patient Instructions provided    - Rest.    - Drink plenty of fluids.  - Viral upper respiratory infections typically run their course in 10-14 days.     - Tylenol or Ibuprofen as directed as needed for fever/pain. Avoid tylenol if you have a history of liver disease. Do not take ibuprofen if you have a history of GI bleeding, kidney disease, or if you take blood thinners.     - You can take over-the-counter claritin, zyrtec, allegra, or xyzal as directed. These are antihistamines that can help with runny nose, nasal congestion, sneezing, and helps to dry up post-nasal drip, which usually causes sore throat and cough.   - If you do NOT have high blood pressure, you may use a decongestant form (D)  of this medication (ie. Claritin- D, zyrtec-D, allegra-D) or if you do not take the D form, you can take sudafed (pseudoephedrine) over the counter, which is a decongestant. Do NOT take two decongestant (D) medications at the same time (such as mucinex-D and claritin-D or plain sudafed and claritin D)    - You can use Flonase (fluticasone) nasal spray as directed for sinus congestion and postnasal drip. This is a steroid nasal spray that works locally over time to decrease the inflammation in your nose/sinuses and help with allergic symptoms. This is not an quick- relief spray like afrin, but it works well if used daily.  Discontinue if you develop nose bleed  - use nasal saline prior to  Flonase.  - Use Ocean Spray Nasal Saline 1-3 puffs each nostril every 2-3 hours then blow out onto tissue. This is to irrigate the nasal passage way to clear the sinus openings. Use until sinus problem resolved.    - you can take plain Mucinex (guaifenesin) 1200 mg twice a day to help loosen mucous.     -warm salt water gargles can help with sore throat    - warm tea with honey can help with cough. Honey is a natural cough suppressant.    - Dextromethorphan (DM) is a cough suppressant over the counter (ie. mucinex DM, robitussin, delsym; dayquil/nyquil has DM as well.)    - Follow up with your PCP or specialty clinic as directed in the next 1-2 weeks if not improved or as needed.  You can call [phone] to schedule an appointment with the appropriate provider.      - Go to the ER if you develop new or worsening symptoms.     - You must understand that you have received an Urgent Care treatment only and that you may be released before all of your medical problems are known or treated.   - You, the patient, will arrange for follow up care as instructed.   - If your condition worsens or fails to improve we recommend that you receive another evaluation at the ER immediately or contact your PCP to discuss your concerns or return here.     Patient Instructions   - You must understand that you have received an Urgent Care treatment only and that you may be released before all of your medical problems are known or treated.   - You, the patient, will arrange for follow up care as instructed.   - If your condition worsens or fails to improve we recommend that you receive another evaluation at the ER immediately or contact your PCP to discuss your concerns or return here.     Advised on return/follow-up precautions. Advised on ER precautions. Answered all patient questions. Patient verbalized understanding and voiced agreement with current treatment plan.

## 2022-11-17 NOTE — PATIENT INSTRUCTIONS
See additional patient Instructions provided    - Rest.    - Drink plenty of fluids.  - Viral upper respiratory infections typically run their course in 10-14 days.     - Tylenol or Ibuprofen as directed as needed for fever/pain. Avoid tylenol if you have a history of liver disease. Do not take ibuprofen if you have a history of GI bleeding, kidney disease, or if you take blood thinners.     - You can take over-the-counter claritin, zyrtec, allegra, or xyzal as directed. These are antihistamines that can help with runny nose, nasal congestion, sneezing, and helps to dry up post-nasal drip, which usually causes sore throat and cough.   - If you do NOT have high blood pressure, you may use a decongestant form (D)  of this medication (ie. Claritin- D, zyrtec-D, allegra-D) or if you do not take the D form, you can take sudafed (pseudoephedrine) over the counter, which is a decongestant. Do NOT take two decongestant (D) medications at the same time (such as mucinex-D and claritin-D or plain sudafed and claritin D)    - You can use Flonase (fluticasone) nasal spray as directed for sinus congestion and postnasal drip. This is a steroid nasal spray that works locally over time to decrease the inflammation in your nose/sinuses and help with allergic symptoms. This is not an quick- relief spray like afrin, but it works well if used daily.  Discontinue if you develop nose bleed  - use nasal saline prior to Flonase.  - Use Ocean Spray Nasal Saline 1-3 puffs each nostril every 2-3 hours then blow out onto tissue. This is to irrigate the nasal passage way to clear the sinus openings. Use until sinus problem resolved.    - you can take plain Mucinex (guaifenesin) 1200 mg twice a day to help loosen mucous.     -warm salt water gargles can help with sore throat    - warm tea with honey can help with cough. Honey is a natural cough suppressant.    - Dextromethorphan (DM) is a cough suppressant over the counter (ie. mucinex DM,  robitussin, delsym; dayquil/nyquil has DM as well.)    - Follow up with your PCP or specialty clinic as directed in the next 1-2 weeks if not improved or as needed.  You can call [phone] to schedule an appointment with the appropriate provider.      - Go to the ER if you develop new or worsening symptoms.     - You must understand that you have received an Urgent Care treatment only and that you may be released before all of your medical problems are known or treated.   - You, the patient, will arrange for follow up care as instructed.   - If your condition worsens or fails to improve we recommend that you receive another evaluation at the ER immediately or contact your PCP to discuss your concerns or return here.     Patient Instructions   - You must understand that you have received an Urgent Care treatment only and that you may be released before all of your medical problems are known or treated.   - You, the patient, will arrange for follow up care as instructed.   - If your condition worsens or fails to improve we recommend that you receive another evaluation at the ER immediately or contact your PCP to discuss your concerns or return here.     Advised on return/follow-up precautions. Advised on ER precautions. Answered all patient questions. Patient verbalized understanding and voiced agreement with current treatment plan.

## 2023-01-11 ENCOUNTER — PATIENT MESSAGE (OUTPATIENT)
Dept: OBSTETRICS AND GYNECOLOGY | Facility: CLINIC | Age: 58
End: 2023-01-11
Payer: COMMERCIAL

## 2023-01-11 ENCOUNTER — TELEPHONE (OUTPATIENT)
Dept: OBSTETRICS AND GYNECOLOGY | Facility: CLINIC | Age: 58
End: 2023-01-11
Payer: COMMERCIAL

## 2023-01-11 DIAGNOSIS — Z79.890 POSTMENOPAUSAL HORMONE REPLACEMENT THERAPY: ICD-10-CM

## 2023-01-12 ENCOUNTER — TELEPHONE (OUTPATIENT)
Dept: OBSTETRICS AND GYNECOLOGY | Facility: CLINIC | Age: 58
End: 2023-01-12
Payer: COMMERCIAL

## 2023-01-12 RX ORDER — PROGESTERONE 200 MG/1
200 CAPSULE ORAL NIGHTLY
Qty: 90 CAPSULE | Refills: 0 | Status: SHIPPED | OUTPATIENT
Start: 2023-01-12 | End: 2023-05-25 | Stop reason: SDUPTHER

## 2023-02-27 DIAGNOSIS — Z12.31 OTHER SCREENING MAMMOGRAM: ICD-10-CM

## 2023-03-01 ENCOUNTER — PATIENT MESSAGE (OUTPATIENT)
Dept: ADMINISTRATIVE | Facility: HOSPITAL | Age: 58
End: 2023-03-01
Payer: COMMERCIAL

## 2023-03-08 ENCOUNTER — HOSPITAL ENCOUNTER (OUTPATIENT)
Dept: RADIOLOGY | Facility: HOSPITAL | Age: 58
Discharge: HOME | End: 2023-03-08
Attending: INTERNAL MEDICINE
Payer: COMMERCIAL

## 2023-03-08 VITALS — HEIGHT: 66 IN | WEIGHT: 148 LBS | BODY MASS INDEX: 23.78 KG/M2

## 2023-03-08 DIAGNOSIS — Z12.31 OTHER SCREENING MAMMOGRAM: ICD-10-CM

## 2023-03-08 PROCEDURE — 77067 SCR MAMMO BI INCL CAD: CPT | Mod: TC,PO

## 2023-03-08 PROCEDURE — 77063 BREAST TOMOSYNTHESIS BI: CPT | Mod: 26,,, | Performed by: RADIOLOGY

## 2023-03-08 PROCEDURE — 77067 MAMMO DIGITAL SCREENING BILAT WITH TOMO: ICD-10-PCS | Mod: 26,,, | Performed by: RADIOLOGY

## 2023-03-08 PROCEDURE — 77063 MAMMO DIGITAL SCREENING BILAT WITH TOMO: ICD-10-PCS | Mod: 26,,, | Performed by: RADIOLOGY

## 2023-03-08 PROCEDURE — 77067 SCR MAMMO BI INCL CAD: CPT | Mod: 26,,, | Performed by: RADIOLOGY

## 2023-03-17 ENCOUNTER — PATIENT MESSAGE (OUTPATIENT)
Dept: RESEARCH | Facility: HOSPITAL | Age: 58
End: 2023-03-17
Payer: COMMERCIAL

## 2023-04-26 ENCOUNTER — LAB VISIT (OUTPATIENT)
Dept: LAB | Facility: OTHER | Age: 58
End: 2023-04-26
Attending: OBSTETRICS & GYNECOLOGY
Payer: COMMERCIAL

## 2023-04-26 ENCOUNTER — OFFICE VISIT (OUTPATIENT)
Dept: OBSTETRICS AND GYNECOLOGY | Facility: CLINIC | Age: 58
End: 2023-04-26
Attending: OBSTETRICS & GYNECOLOGY
Payer: COMMERCIAL

## 2023-04-26 VITALS
HEIGHT: 66 IN | WEIGHT: 158.63 LBS | SYSTOLIC BLOOD PRESSURE: 144 MMHG | DIASTOLIC BLOOD PRESSURE: 94 MMHG | BODY MASS INDEX: 25.49 KG/M2

## 2023-04-26 DIAGNOSIS — Z79.890 POSTMENOPAUSAL HORMONE REPLACEMENT THERAPY: ICD-10-CM

## 2023-04-26 DIAGNOSIS — Z12.4 ENCOUNTER FOR PAPANICOLAOU SMEAR FOR CERVICAL CANCER SCREENING: ICD-10-CM

## 2023-04-26 DIAGNOSIS — Z01.419 ENCOUNTER FOR GYNECOLOGICAL EXAMINATION WITHOUT ABNORMAL FINDING: Primary | ICD-10-CM

## 2023-04-26 LAB
ESTRADIOL SERPL-MCNC: 39 PG/ML
PROGEST SERPL-MCNC: 4.8 NG/ML

## 2023-04-26 PROCEDURE — 84402 ASSAY OF FREE TESTOSTERONE: CPT | Performed by: OBSTETRICS & GYNECOLOGY

## 2023-04-26 PROCEDURE — 88175 CYTOPATH C/V AUTO FLUID REDO: CPT | Performed by: OBSTETRICS & GYNECOLOGY

## 2023-04-26 PROCEDURE — 99396 PR PREVENTIVE VISIT,EST,40-64: ICD-10-PCS | Mod: S$GLB,,, | Performed by: OBSTETRICS & GYNECOLOGY

## 2023-04-26 PROCEDURE — 36415 COLL VENOUS BLD VENIPUNCTURE: CPT | Performed by: OBSTETRICS & GYNECOLOGY

## 2023-04-26 PROCEDURE — 1159F MED LIST DOCD IN RCRD: CPT | Mod: CPTII,S$GLB,, | Performed by: OBSTETRICS & GYNECOLOGY

## 2023-04-26 PROCEDURE — 3080F DIAST BP >= 90 MM HG: CPT | Mod: CPTII,S$GLB,, | Performed by: OBSTETRICS & GYNECOLOGY

## 2023-04-26 PROCEDURE — 82670 ASSAY OF TOTAL ESTRADIOL: CPT | Performed by: OBSTETRICS & GYNECOLOGY

## 2023-04-26 PROCEDURE — 99396 PREV VISIT EST AGE 40-64: CPT | Mod: S$GLB,,, | Performed by: OBSTETRICS & GYNECOLOGY

## 2023-04-26 PROCEDURE — 99999 PR PBB SHADOW E&M-EST. PATIENT-LVL III: ICD-10-PCS | Mod: PBBFAC,,, | Performed by: OBSTETRICS & GYNECOLOGY

## 2023-04-26 PROCEDURE — 3008F BODY MASS INDEX DOCD: CPT | Mod: CPTII,S$GLB,, | Performed by: OBSTETRICS & GYNECOLOGY

## 2023-04-26 PROCEDURE — 87624 HPV HI-RISK TYP POOLED RSLT: CPT | Performed by: OBSTETRICS & GYNECOLOGY

## 2023-04-26 PROCEDURE — 3080F PR MOST RECENT DIASTOLIC BLOOD PRESSURE >= 90 MM HG: ICD-10-PCS | Mod: CPTII,S$GLB,, | Performed by: OBSTETRICS & GYNECOLOGY

## 2023-04-26 PROCEDURE — 3077F PR MOST RECENT SYSTOLIC BLOOD PRESSURE >= 140 MM HG: ICD-10-PCS | Mod: CPTII,S$GLB,, | Performed by: OBSTETRICS & GYNECOLOGY

## 2023-04-26 PROCEDURE — 84144 ASSAY OF PROGESTERONE: CPT | Performed by: OBSTETRICS & GYNECOLOGY

## 2023-04-26 PROCEDURE — 3077F SYST BP >= 140 MM HG: CPT | Mod: CPTII,S$GLB,, | Performed by: OBSTETRICS & GYNECOLOGY

## 2023-04-26 PROCEDURE — 99999 PR PBB SHADOW E&M-EST. PATIENT-LVL III: CPT | Mod: PBBFAC,,, | Performed by: OBSTETRICS & GYNECOLOGY

## 2023-04-26 PROCEDURE — 3008F PR BODY MASS INDEX (BMI) DOCUMENTED: ICD-10-PCS | Mod: CPTII,S$GLB,, | Performed by: OBSTETRICS & GYNECOLOGY

## 2023-04-26 PROCEDURE — 1159F PR MEDICATION LIST DOCUMENTED IN MEDICAL RECORD: ICD-10-PCS | Mod: CPTII,S$GLB,, | Performed by: OBSTETRICS & GYNECOLOGY

## 2023-04-26 RX ORDER — DESVENLAFAXINE SUCCINATE 50 MG/1
50 TABLET, EXTENDED RELEASE ORAL
COMMUNITY
Start: 2023-03-28

## 2023-04-26 NOTE — PROGRESS NOTES
Subjective:       Patient ID: Chaya Tam is a 58 y.o. female.    Chief Complaint:  Annual Exam and Gynecologic Exam      History of Present Illness  HPI    Chaya Tam is a 58 y.o. female  here for her annual GYN exam.  She is on compounded estrogen/Testosterone gel from [employer] and taking oral Prometrium 200mg nightly.   She is menopausal since age 45.   denies break through bleeding.   denies vaginal itching or irritation.  Denies vaginal discharge.  She is sexually active. She has had1 partner for the past 2 years(female partner) .     History of abnormal pap: No  Last Pap: approximate date 2020 and was normal(and negative for HR HPV)  Last MMG: normal-3-8-2023: -routine follow-up in 12 months  Last Colonoscopy:  colonoscopy 3 years ago with abnormalities.(Advised to return in 5 years)  denies domestic violence. She does feel safe at home.     Past Medical History:   Diagnosis Date    Hypertension     Kidney stones      Past Surgical History:   Procedure Laterality Date    ARTHROSCOPY OF KNEE Left 2019    Procedure: ARTHROSCOPY, KNEE;  Surgeon: Jacek Silveira MD;  Location: [facility];  Service: Orthopedics;  Laterality: Left;    COLONOSCOPY N/A 2020    Procedure: COLONOSCOPY;  Surgeon: Eddie Leigh MD;  [facility] ([address] [place] [address]);  Service: Endoscopy;  Laterality: N/A;  covid-20-[facility] [place]-2nd floor-BB  instructions emailed to patient-BB    CRYOABLATION  1999    DILATION AND CURETTAGE OF UTERUS      EXCISION OF MEDIAL MENISCUS OF KNEE Left 2019    Procedure: MENISCECTOMY, KNEE, MEDIAL;  Surgeon: Jacek Silveira MD;  Location: [facility];  Service: Orthopedics;  Laterality: Left;    kidney stones       Social History     Socioeconomic History    Marital status:    Tobacco Use    Smoking status: Never    Smokeless tobacco: Never   Substance and Sexual Activity    Alcohol use: Yes     Alcohol/week: 6.0 standard drinks     Types: 3 Glasses of wine, 3 Cans  "of beer per week     Comment: socially    Drug use: No    Sexual activity: Yes     Partners: Female     Comment: , new partner since      Family History   Problem Relation Age of Onset    Hypertension Father     Stroke Mother 76    Breast cancer Sister 48    Heart attack Neg Hx     Heart disease Neg Hx     Colon cancer Neg Hx     Ovarian cancer Neg Hx     Diabetes Neg Hx      OB History          3    Para   2    Term   2            AB   1    Living   2         SAB   1    IAB        Ectopic        Multiple        Live Births   2                 BP (!) 144/94   Ht 5' 6" (1.676 m)   Wt 71.9 kg (158 lb 9.6 oz)   LMP 2010 (Approximate)   BMI 25.60 kg/m²         GYN & OB History  Patient's last menstrual period was 2010 (approximate).   Date of Last Pap: 2023    OB History    Para Term  AB Living   3 2 2   1 2   SAB IAB Ectopic Multiple Live Births   1       2      # Outcome Date GA Lbr Benson/2nd Weight Sex Delivery Anes PTL Lv   3 Term      Vag-Spont   LEONORA   2 Term      Vag-Spont   LEONORA   1 SAB                Review of Systems  Review of Systems   Constitutional:  Negative for activity change, appetite change, fatigue and unexpected weight change.   HENT: Negative.     Eyes:  Negative for visual disturbance.   Respiratory:  Negative for shortness of breath and wheezing.    Cardiovascular:  Negative for chest pain, palpitations and leg swelling.   Gastrointestinal:  Negative for abdominal pain, bloating and blood in stool.   Endocrine: Negative for diabetes and hair loss.   Genitourinary:  Negative for decreased libido, dyspareunia, hot flashes, postmenopausal bleeding and vaginal dryness.   Musculoskeletal:  Negative for back pain and joint swelling.   Integumentary:  Negative for acne, hair changes and nipple discharge.   Neurological:  Negative for headaches.   Hematological:  Does not bruise/bleed easily.   Psychiatric/Behavioral:  Negative for depression and " sleep disturbance. The patient is not nervous/anxious.    Breast: Negative for mastodynia and nipple discharge        Objective:      Physical Exam:   Constitutional: She is oriented to person, place, and time. She appears well-developed and well-nourished.    HENT:   Head: Normocephalic and atraumatic.    Eyes: Pupils are equal, round, and reactive to light. EOM are normal.     Cardiovascular:  Normal rate and regular rhythm.             Pulmonary/Chest: Effort normal and breath sounds normal.   BREASTS:  no mass, no tenderness, no deformity and no retraction. Right breast exhibits no inverted nipple, no mass, no nipple discharge, no skin change, no tenderness, no bleeding and no swelling. Left breast exhibits no inverted nipple, no mass, no nipple discharge, no skin change, no tenderness, no bleeding and no swelling. Breasts are symmetrical.              Abdominal: Soft. Bowel sounds are normal.     Genitourinary:    Pelvic exam was performed with patient supine.      Genitourinary Comments: PELVIC: Normal external genitalia without lesions.  Normal hair distribution.  Adequate perineal body, normal urethral meatus.  Vagina  Moderately rugated, Minimally atrophic, without lesions or discharge.  Cervix pink, without lesions, discharge or tenderness.  No significant cystocele or rectocele.  Bimanual exam shows uterus to be normal size, regular, mobile and nontender.  Adnexa without masses or tenderness.    RECTAL:Deferred                 Musculoskeletal: Normal range of motion and moves all extremeties.       Neurological: She is alert and oriented to person, place, and time.    Skin: Skin is warm and dry.    Psychiatric: She has a normal mood and affect.            Assessment:        1. Encounter for gynecological examination without abnormal finding    2. Encounter for Papanicolaou smear for cervical cancer screening    3. Postmenopausal hormone replacement therapy                Plan:        Problem List Items  Addressed This Visit    None  Visit Diagnoses       Encounter for gynecological examination without abnormal finding    -  Primary    Encounter for Papanicolaou smear for cervical cancer screening        Relevant Orders    Liquid-Based Pap Smear, Screening    HPV High Risk Genotypes, PCR    Postmenopausal hormone replacement therapy        Relevant Medications    UNABLE TO FIND    Other Relevant Orders    Estradiol    Progesterone    Testosterone, Free             Follow up in about 1 year (around 4/26/2024).

## 2023-05-01 LAB — TESTOST FREE SERPL-MCNC: 2 PG/ML

## 2023-05-02 LAB
FINAL PATHOLOGIC DIAGNOSIS: NORMAL
Lab: NORMAL

## 2023-05-25 DIAGNOSIS — Z79.890 POSTMENOPAUSAL HORMONE REPLACEMENT THERAPY: ICD-10-CM

## 2023-05-29 RX ORDER — PROGESTERONE 200 MG/1
200 CAPSULE ORAL NIGHTLY
Qty: 90 CAPSULE | Refills: 2 | Status: SHIPPED | OUTPATIENT
Start: 2023-05-29 | End: 2024-05-28

## 2023-06-05 ENCOUNTER — PATIENT MESSAGE (OUTPATIENT)
Dept: OBSTETRICS AND GYNECOLOGY | Facility: CLINIC | Age: 58
End: 2023-06-05
Payer: COMMERCIAL

## 2023-12-07 ENCOUNTER — TELEPHONE (OUTPATIENT)
Dept: OBSTETRICS AND GYNECOLOGY | Facility: CLINIC | Age: 58
End: 2023-12-07
Payer: COMMERCIAL

## 2024-01-04 DIAGNOSIS — Z79.890 POSTMENOPAUSAL HORMONE REPLACEMENT THERAPY: ICD-10-CM

## 2024-03-27 DIAGNOSIS — Z12.31 OTHER SCREENING MAMMOGRAM: ICD-10-CM

## 2024-04-04 ENCOUNTER — HOSPITAL ENCOUNTER (OUTPATIENT)
Dept: RADIOLOGY | Facility: HOSPITAL | Age: 59
Discharge: HOME | End: 2024-04-04
Attending: INTERNAL MEDICINE
Payer: COMMERCIAL

## 2024-04-04 VITALS — HEIGHT: 66 IN | BODY MASS INDEX: 25.39 KG/M2 | WEIGHT: 158 LBS

## 2024-04-04 DIAGNOSIS — Z12.31 OTHER SCREENING MAMMOGRAM: ICD-10-CM

## 2024-04-04 PROCEDURE — 77063 BREAST TOMOSYNTHESIS BI: CPT | Mod: 26,,, | Performed by: RADIOLOGY

## 2024-04-04 PROCEDURE — 77067 SCR MAMMO BI INCL CAD: CPT | Mod: 26,,, | Performed by: RADIOLOGY

## 2024-04-04 PROCEDURE — 77067 SCR MAMMO BI INCL CAD: CPT | Mod: TC

## 2024-04-11 ENCOUNTER — PATIENT MESSAGE (OUTPATIENT)
Dept: PRIMARY CARE CLINIC | Facility: CLINIC | Age: 59
End: 2024-04-11
Payer: COMMERCIAL

## 2024-04-11 DIAGNOSIS — Z79.890 POSTMENOPAUSAL HORMONE REPLACEMENT THERAPY: ICD-10-CM

## 2024-04-11 RX ORDER — PROGESTERONE 200 MG/1
200 CAPSULE ORAL NIGHTLY
Qty: 90 CAPSULE | Refills: 2 | Status: SHIPPED | OUTPATIENT
Start: 2024-04-11 | End: 2025-04-11

## 2024-04-24 ENCOUNTER — LAB VISIT (OUTPATIENT)
Dept: LAB | Facility: HOSPITAL | Age: 59
End: 2024-04-24
Attending: INTERNAL MEDICINE
Payer: COMMERCIAL

## 2024-04-24 ENCOUNTER — PATIENT MESSAGE (OUTPATIENT)
Dept: PRIMARY CARE CLINIC | Facility: CLINIC | Age: 59
End: 2024-04-24

## 2024-04-24 ENCOUNTER — OFFICE VISIT (OUTPATIENT)
Dept: PRIMARY CARE CLINIC | Facility: CLINIC | Age: 59
End: 2024-04-24
Payer: COMMERCIAL

## 2024-04-24 VITALS
HEART RATE: 73 BPM | SYSTOLIC BLOOD PRESSURE: 120 MMHG | WEIGHT: 154.31 LBS | HEIGHT: 66 IN | DIASTOLIC BLOOD PRESSURE: 82 MMHG | OXYGEN SATURATION: 98 % | BODY MASS INDEX: 24.8 KG/M2

## 2024-04-24 DIAGNOSIS — M81.0 OSTEOPOROSIS, UNSPECIFIED OSTEOPOROSIS TYPE, UNSPECIFIED PATHOLOGICAL FRACTURE PRESENCE: ICD-10-CM

## 2024-04-24 DIAGNOSIS — Z00.00 PREVENTATIVE HEALTH CARE: Primary | ICD-10-CM

## 2024-04-24 DIAGNOSIS — B00.1 HERPES LABIALIS: ICD-10-CM

## 2024-04-24 DIAGNOSIS — E55.9 VITAMIN D DEFICIENCY: ICD-10-CM

## 2024-04-24 DIAGNOSIS — Z86.010 HISTORY OF COLON POLYPS: ICD-10-CM

## 2024-04-24 DIAGNOSIS — N25.81 SECONDARY HYPERPARATHYROIDISM: ICD-10-CM

## 2024-04-24 DIAGNOSIS — Z00.00 PREVENTATIVE HEALTH CARE: ICD-10-CM

## 2024-04-24 DIAGNOSIS — F98.8 ATTENTION DEFICIT DISORDER (ADD) WITHOUT HYPERACTIVITY: ICD-10-CM

## 2024-04-24 DIAGNOSIS — K59.01 SLOW TRANSIT CONSTIPATION: ICD-10-CM

## 2024-04-24 DIAGNOSIS — R00.2 PALPITATIONS: ICD-10-CM

## 2024-04-24 DIAGNOSIS — N95.1 HOT FLASHES DUE TO MENOPAUSE: ICD-10-CM

## 2024-04-24 DIAGNOSIS — J30.1 SEASONAL ALLERGIC RHINITIS DUE TO POLLEN: ICD-10-CM

## 2024-04-24 DIAGNOSIS — N20.0 CALCULUS OF KIDNEY: ICD-10-CM

## 2024-04-24 DIAGNOSIS — F51.02 ADJUSTMENT INSOMNIA: ICD-10-CM

## 2024-04-24 DIAGNOSIS — M54.16 LUMBAR RADICULOPATHY, CHRONIC: ICD-10-CM

## 2024-04-24 PROBLEM — R03.0 ELEVATED BP WITHOUT DIAGNOSIS OF HYPERTENSION: Status: RESOLVED | Noted: 2022-10-12 | Resolved: 2024-04-24

## 2024-04-24 LAB
25(OH)D3+25(OH)D2 SERPL-MCNC: 43 NG/ML (ref 30–96)
ALBUMIN SERPL BCP-MCNC: 4.2 G/DL (ref 3.5–5.2)
ALP SERPL-CCNC: 61 U/L (ref 55–135)
ALT SERPL W/O P-5'-P-CCNC: 24 U/L (ref 10–44)
ANION GAP SERPL CALC-SCNC: 8 MMOL/L (ref 8–16)
AST SERPL-CCNC: 19 U/L (ref 10–40)
BASOPHILS # BLD AUTO: 0.03 K/UL (ref 0–0.2)
BASOPHILS NFR BLD: 0.5 % (ref 0–1.9)
BILIRUB SERPL-MCNC: 0.8 MG/DL (ref 0.1–1)
BUN SERPL-MCNC: 13 MG/DL (ref 6–20)
CALCIUM SERPL-MCNC: 9.6 MG/DL (ref 8.7–10.5)
CHLORIDE SERPL-SCNC: 104 MMOL/L (ref 95–110)
CHOLEST SERPL-MCNC: 167 MG/DL (ref 120–199)
CHOLEST/HDLC SERPL: 3.3 {RATIO} (ref 2–5)
CO2 SERPL-SCNC: 28 MMOL/L (ref 23–29)
CREAT SERPL-MCNC: 0.8 MG/DL (ref 0.5–1.4)
DIFFERENTIAL METHOD BLD: NORMAL
EOSINOPHIL # BLD AUTO: 0.1 K/UL (ref 0–0.5)
EOSINOPHIL NFR BLD: 2.3 % (ref 0–8)
ERYTHROCYTE [DISTWIDTH] IN BLOOD BY AUTOMATED COUNT: 12.1 % (ref 11.5–14.5)
EST. GFR  (NO RACE VARIABLE): >60 ML/MIN/1.73 M^2
ESTIMATED AVG GLUCOSE: 105 MG/DL (ref 68–131)
GLUCOSE SERPL-MCNC: 90 MG/DL (ref 70–110)
HBA1C MFR BLD: 5.3 % (ref 4–5.6)
HCT VFR BLD AUTO: 42 % (ref 37–48.5)
HDLC SERPL-MCNC: 51 MG/DL (ref 40–75)
HDLC SERPL: 30.5 % (ref 20–50)
HGB BLD-MCNC: 14.1 G/DL (ref 12–16)
IMM GRANULOCYTES # BLD AUTO: 0.02 K/UL (ref 0–0.04)
IMM GRANULOCYTES NFR BLD AUTO: 0.3 % (ref 0–0.5)
LDLC SERPL CALC-MCNC: 96.8 MG/DL (ref 63–159)
LYMPHOCYTES # BLD AUTO: 1.8 K/UL (ref 1–4.8)
LYMPHOCYTES NFR BLD: 30 % (ref 18–48)
MCH RBC QN AUTO: 30.9 PG (ref 27–31)
MCHC RBC AUTO-ENTMCNC: 33.6 G/DL (ref 32–36)
MCV RBC AUTO: 92 FL (ref 82–98)
MONOCYTES # BLD AUTO: 0.5 K/UL (ref 0.3–1)
MONOCYTES NFR BLD: 7.5 % (ref 4–15)
NEUTROPHILS # BLD AUTO: 3.6 K/UL (ref 1.8–7.7)
NEUTROPHILS NFR BLD: 59.4 % (ref 38–73)
NONHDLC SERPL-MCNC: 116 MG/DL
NRBC BLD-RTO: 0 /100 WBC
PLATELET # BLD AUTO: 258 K/UL (ref 150–450)
PMV BLD AUTO: 10.1 FL (ref 9.2–12.9)
POTASSIUM SERPL-SCNC: 4.8 MMOL/L (ref 3.5–5.1)
PROT SERPL-MCNC: 6.9 G/DL (ref 6–8.4)
RBC # BLD AUTO: 4.57 M/UL (ref 4–5.4)
SODIUM SERPL-SCNC: 140 MMOL/L (ref 136–145)
TRIGL SERPL-MCNC: 96 MG/DL (ref 30–150)
WBC # BLD AUTO: 6.11 K/UL (ref 3.9–12.7)

## 2024-04-24 PROCEDURE — 3008F BODY MASS INDEX DOCD: CPT | Mod: CPTII,S$GLB,, | Performed by: INTERNAL MEDICINE

## 2024-04-24 PROCEDURE — 99396 PREV VISIT EST AGE 40-64: CPT | Mod: S$GLB,,, | Performed by: INTERNAL MEDICINE

## 2024-04-24 PROCEDURE — 80053 COMPREHEN METABOLIC PANEL: CPT | Performed by: INTERNAL MEDICINE

## 2024-04-24 PROCEDURE — 3074F SYST BP LT 130 MM HG: CPT | Mod: CPTII,S$GLB,, | Performed by: INTERNAL MEDICINE

## 2024-04-24 PROCEDURE — 80061 LIPID PANEL: CPT | Performed by: INTERNAL MEDICINE

## 2024-04-24 PROCEDURE — 1159F MED LIST DOCD IN RCRD: CPT | Mod: CPTII,S$GLB,, | Performed by: INTERNAL MEDICINE

## 2024-04-24 PROCEDURE — 82306 VITAMIN D 25 HYDROXY: CPT | Performed by: INTERNAL MEDICINE

## 2024-04-24 PROCEDURE — 85025 COMPLETE CBC W/AUTO DIFF WBC: CPT | Performed by: INTERNAL MEDICINE

## 2024-04-24 PROCEDURE — 3079F DIAST BP 80-89 MM HG: CPT | Mod: CPTII,S$GLB,, | Performed by: INTERNAL MEDICINE

## 2024-04-24 PROCEDURE — 83036 HEMOGLOBIN GLYCOSYLATED A1C: CPT | Performed by: INTERNAL MEDICINE

## 2024-04-24 PROCEDURE — 99999 PR PBB SHADOW E&M-EST. PATIENT-LVL V: CPT | Mod: PBBFAC,,, | Performed by: INTERNAL MEDICINE

## 2024-04-24 PROCEDURE — 36415 COLL VENOUS BLD VENIPUNCTURE: CPT | Mod: PN | Performed by: INTERNAL MEDICINE

## 2024-04-24 RX ORDER — LISDEXAMFETAMINE DIMESYLATE 40 MG/1
40 CAPSULE ORAL EVERY MORNING
COMMUNITY
Start: 2024-04-06

## 2024-04-24 RX ORDER — BLOOD-GLUCOSE SENSOR
EACH MISCELLANEOUS
COMMUNITY
Start: 2024-01-24 | End: 2024-04-24

## 2024-04-24 RX ORDER — VALACYCLOVIR HYDROCHLORIDE 1 G/1
2000 TABLET, FILM COATED ORAL EVERY 12 HOURS
Qty: 30 TABLET | Refills: 3 | Status: SHIPPED | OUTPATIENT
Start: 2024-04-24

## 2024-04-24 RX ORDER — LEVOFLOXACIN 500 MG/1
500 TABLET, FILM COATED ORAL
COMMUNITY
Start: 2023-11-13 | End: 2024-04-24

## 2024-04-24 RX ORDER — TRAZODONE HYDROCHLORIDE 50 MG/1
50 TABLET ORAL NIGHTLY
Qty: 90 TABLET | Refills: 3 | Status: SHIPPED | OUTPATIENT
Start: 2024-04-24

## 2024-04-24 RX ORDER — METHYLPREDNISOLONE 4 MG/1
TABLET ORAL
COMMUNITY
Start: 2023-11-13 | End: 2024-04-24

## 2024-04-24 NOTE — PATIENT INSTRUCTIONS
Labs today  Get Shingrex, flu, COVID   Refer for eye and DEXa at [facility]     Drink 2 liters of water daily  Avoid high sodium and high oxalate foods ie beans, raspberries, chocolate, beets, grapefruit, processed meats, seeds, nuts, yams, potatoes, eggplant, tofu, wheat bran, spinach, pumpkin, rhubarb, swiss chard, soy, Vit C rich foods

## 2024-04-24 NOTE — ASSESSMENT & PLAN NOTE
I have reviewed discharge instructions with the patient. The patient verbalized understanding. Patient left ED via Discharge Method: ambulatory to Home with transport from family. The patient is ambulatory upon exit and appears in no acute distress. The patient has been provided discharge instructions, prescriptions x3, and follow up information. The patient does not have any questions at this time. Opportunity for questions and clarification provided. Patient given 3 scripts. To continue your aftercare when you leave the hospital, you may receive an automated call from our care team to check in on how you are doing. This is a free service and part of our promise to provide the best care and service to meet your aftercare needs.  If you have questions, or wish to unsubscribe from this service please call [phone]. Thank you for Choosing our [facility] Emergency Department. Needs to drink 1-2 litersl     Drink 2 liters of water daily  Avoid high sodium and high oxalate foods ie beans, raspberries, chocolate, beets, grapefruit, processed meats, seeds, nuts, yams, potatoes, eggplant, tofu, wheat bran, spinach, pumpkin, rhubarb, swiss chard, soy, Vit C rich foods

## 2024-04-24 NOTE — ASSESSMENT & PLAN NOTE
Order DEXA now in July 2024 -  will start fosamax 70mg weekly if still has osteoporosis  Check Vit D level

## 2024-04-24 NOTE — ASSESSMENT & PLAN NOTE
Cont Vyvanse 40mg daily   Monitor for side effects ie abdominal pain, N/V, headaches, insomnia, weight loss, appetite suppression   Take frequent holidays

## 2024-04-24 NOTE — PROGRESS NOTES
[facility]/Pediatric[facility] Progress Note      Chief Complaint     Annual Exam       Subjective:      History of Present Illness:  Chaya Tam is a 59 y.o. female     ADD: her psychiatrist will be retiring so requests that I refill Vyvanse 40mg daily as needed    Depression: well-controlled on Pristiq 50mg daily       Osteoporosis LS T-2.5 (-2.8 in 2018): had taken fosamax x 1 year and then stopped in Summer 2020. Taking Vit D 2000 units daily but no longer on Caltrate.  Vit D level 81 in 2021     Right Calcium  oxalate Nephrolithiasis:  2017 dx'ed by Dr. Coffey with stent then removal and now with persistent in right lower kidney. Is NOT drinking 1 liter of water daily     Insomnia: refill trazodone 50mg po qhs      Herpes Labialis: uses Valtrex prn; now has a upper lip     HRT: uses compounded estradiol gel qhs and prometrium 200mg qhs as per Dr. Parada     AR: takes flonase prn      Back spasms:  uses robaxin prn     Palpitations/Constipation: resolved with Mag 500mg qhs with complete resolution     PMH: dad HTN  at 91 y/o from viral encephalitis; Mom had CVA amyloidosis dx'ed at 77 y/o  in 80s.  Younger sister with HTN on 3 BP meds; older brother is preDM.      SH: no tobacco; wine/beer/bourbon - decreased from daily to weekend approx 1 month ago. No drugs;  but lives with significant other and is very happy; all of her adult 3 kids are doing well; works from home for [employer]      Menopause: 44 years then went on hormones approx 6 years on HRT on compounded estrogen/progesterone formulation to minimize BTB by Dr. Lopez.      Past Medical History:  Past Medical History:   Diagnosis Date    Hypertension     Kidney stones     Secondary hyperparathyroidism 2022: Ca 10.3, PTH 15, Vit D 81         Past Surgical History:  Past Surgical History:   Procedure Laterality Date    ARTHROSCOPY OF KNEE Left 2019    Procedure: ARTHROSCOPY, KNEE;  Surgeon: Jacek TATE  MD Jordana;  Location: [facility];  Service: Orthopedics;  Laterality: Left;    COLONOSCOPY N/A 07/30/2020    Procedure: COLONOSCOPY;  Surgeon: Eddie Leigh MD;  Location: [facility] ([facility]);  Service: Endoscopy;  Laterality: N/A;  covid-7/27/20-[facility]2nd floor-[facility]  instructions emailed to patient-BB    CRYOABLATION  04/14/1999    DILATION AND CURETTAGE OF UTERUS      EXCISION OF MEDIAL MENISCUS OF KNEE Left 01/18/2019    Procedure: MENISCECTOMY, KNEE, MEDIAL;  Surgeon: Jacek Silveira MD;  Location: [facility] OR;  Service: Orthopedics;  Laterality: Left;    kidney stones         Allergies:  Review of patient's allergies indicates:   Allergen Reactions    Bactrim [sulfamethoxazole-trimethoprim] Rash       Home Medications:  Current Outpatient Medications   Medication Sig Dispense Refill    AA-prot-lysine-methio-vit C-B6 (A/G PRO) 50-12.5-16.7 mg Tab       cholecalciferol, vitamin D3, (VITAMIN D3) 10 mcg (400 unit) Tab       desvenlafaxine succinate (PRISTIQ) 50 MG Tb24 Take 50 mg by mouth.      fluticasone propionate (FLONASE) 50 mcg/actuation nasal spray 1 spray by Each Nostril route 2 (two) times daily.      mywon-ht-0-dha-epa-phospho-ast 1,000-230-60 mg Cap       Lactobacillus acidophilus (PROBIOTIC) 10 billion cell Cap       magnesium oxide 500 mg Cap       methocarbamoL (ROBAXIN) 500 MG Tab Take 500 mg by mouth 2 (two) times daily.      multivitamin with iron Tab Take by mouth.      progesterone (PROMETRIUM) 200 MG capsule Take 1 capsule (200 mg total) by mouth nightly. 90 capsule 2    UNABLE TO FIND Apply 0.5 g topically every evening. Estradiol 2mg/g: Testosterone 1%(50mg/5g)  (1mg estradiol/ 5mg Test) nightly(1/2 gram nightly or two clicks nightly) 30 g 5    VYVANSE 40 mg Cap Take 40 mg by mouth every morning.      traZODone (DESYREL) 50 MG tablet Take 1 tablet (50 mg total) by mouth every evening. 90 tablet 3    valACYclovir (VALTREX) 1000 MG tablet Take 2 tablets (2,000 mg total) by mouth every 12 (twelve)  hours. 30 tablet 3     No current facility-administered medications for this visit.     Facility-Administered Medications Ordered in Other Visits   Medication Dose Route Frequency Provider Last Rate Last Admin    0.9%  NaCl infusion   Intravenous Continuous Jacek Silveira MD   New Bag at 07/30/20 0828    0.9%  NaCl infusion   Intravenous Continuous Jacek Silveira MD            Family History:  Family History   Problem Relation Name Age of Onset    Hypertension Father      Stroke Mother  76    Breast cancer Sister  48    Heart attack Neg Hx      Heart disease Neg Hx      Colon cancer Neg Hx      Ovarian cancer Neg Hx      Diabetes Neg Hx         Social History:  Social History     Tobacco Use    Smoking status: Never    Smokeless tobacco: Never   Substance Use Topics    Alcohol use: Yes     Alcohol/week: 6.0 standard drinks of alcohol     Types: 3 Glasses of wine, 3 Cans of beer per week     Comment: socially    Drug use: No       Review of Systems:  Pertinent positives and negatives listed in HPI. All other systems are reviewed and are negative.    Health Maintaince :   Health Maintenance Topics with due status: Not Due       Topic Last Completion Date    Colorectal Cancer Screening 07/30/2020    TETANUS VACCINE 08/06/2021    Cervical Cancer Screening 04/26/2023    Mammogram 04/04/2024    Lipid Panel 04/24/2024           Eye: needs  Dental: UTD    Immunizations:   Tdap: n/a.  Influenza: needs.  Pneumovax: n/a  Shingrex x 2: needs  COVID: needs   Hepatitis C:   Cancer Screening:  PAP: UTD Dr. Parada 4/2023   Mammogram: UTD 4/2024  Colonoscopy: needs 7/2025 due to 1mm sessile transverse colon   DEXA:  needs repeat in July 2024  LDCT: n/a    The 10-year ASCVD risk score (Colt LUONG, et al., 2019) is: 2.4%    Values used to calculate the score:      Age: 59 years      Sex: Female      Is Non- : No      Diabetic: No      Tobacco smoker: No      Systolic Blood Pressure: 120 mmHg      Is  "BP treated: No      HDL Cholesterol: 51 mg/dL      Total Cholesterol: 167 mg/dL      Objective:   /82 (BP Location: Right arm, Patient Position: Sitting, BP Method: Medium (Manual))   Pulse 73   Ht 5' 6" (1.676 m)   Wt 70 kg (154 lb 5.2 oz)   LMP 08/18/2010 (Approximate)   SpO2 98%   BMI 24.91 kg/m²      Body mass index is 24.91 kg/m².       Physical Examination:  General: Alert and awake in no apparent distress  HEENT: Normocephalic and atraumatic; Tms WNL  Eyes:  PERRL; EOMi with anicteric sclera and clear conjunctivae  Mouth:  Oropharynx clear and without exudate; moist mucous membranes  Neck:   Cervical nodes not enlarged; supple; no bruits  Cardio:  Regular rate and rhythm with normal S1 and S2; no murmurs or rubs  Resp:  CTAB; respirations unlabored; no wheezes, crackles or rhonchi  Abdom: Soft, NTND with normoactive bowel sounds; negative HSM  Extrem: Warm and well-perfused with no clubbing, cyanosis or edema  Skin:  No rashes, lesions, or color changes  Pulses:  2+ and symmetric distally  Neuro:  AAOx3; cooperative and pleasant with no focal deficits    Laboratory:      Most Recent Data:  Lab Results   Component Value Date    WBC 6.11 04/24/2024    HGB 14.1 04/24/2024    HCT 42.0 04/24/2024     04/24/2024    CHOL 167 04/24/2024    TRIG 96 04/24/2024    HDL 51 04/24/2024    ALT 24 04/24/2024    AST 19 04/24/2024     04/24/2024    K 4.8 04/24/2024     04/24/2024    BUN 13 04/24/2024    CO2 28 04/24/2024    TSH 1.791 10/12/2022    HGBA1C 5.3 04/24/2024              CBC:   WBC   Date Value Ref Range Status   04/24/2024 6.11 3.90 - 12.70 K/uL Final     Hemoglobin   Date Value Ref Range Status   04/24/2024 14.1 12.0 - 16.0 g/dL Final     Hematocrit   Date Value Ref Range Status   04/24/2024 42.0 37.0 - 48.5 % Final     Platelets   Date Value Ref Range Status   04/24/2024 258 150 - 450 K/uL Final     MCV   Date Value Ref Range Status   04/24/2024 92 82 - 98 fL Final     RDW   Date " "Value Ref Range Status   04/24/2024 12.1 11.5 - 14.5 % Final     BMP:   Sodium   Date Value Ref Range Status   04/24/2024 140 136 - 145 mmol/L Final     Potassium   Date Value Ref Range Status   04/24/2024 4.8 3.5 - 5.1 mmol/L Final     Chloride   Date Value Ref Range Status   04/24/2024 104 95 - 110 mmol/L Final     CO2   Date Value Ref Range Status   04/24/2024 28 23 - 29 mmol/L Final     BUN   Date Value Ref Range Status   04/24/2024 13 6 - 20 mg/dL Final     Creatinine   Date Value Ref Range Status   04/24/2024 0.8 0.5 - 1.4 mg/dL Final     Glucose   Date Value Ref Range Status   04/24/2024 90 70 - 110 mg/dL Final     Calcium   Date Value Ref Range Status   04/24/2024 9.6 8.7 - 10.5 mg/dL Final     Magnesium   Date Value Ref Range Status   10/12/2022 2.2 1.6 - 2.6 mg/dL Final     LFTs:   Total Protein   Date Value Ref Range Status   04/24/2024 6.9 6.0 - 8.4 g/dL Final     Albumin   Date Value Ref Range Status   04/24/2024 4.2 3.5 - 5.2 g/dL Final     Total Bilirubin   Date Value Ref Range Status   04/24/2024 0.8 0.1 - 1.0 mg/dL Final     Comment:     For infants and newborns, interpretation of results should be based  on gestational age, weight and in agreement with clinical  observations.    Premature Infant recommended reference ranges:  Up to 24 hours.............<8.0 mg/dL  Up to 48 hours............<12.0 mg/dL  3-5 days..................<15.0 mg/dL  6-29 days.................<15.0 mg/dL       AST   Date Value Ref Range Status   04/24/2024 19 10 - 40 U/L Final     Alkaline Phosphatase   Date Value Ref Range Status   04/24/2024 61 55 - 135 U/L Final     ALT   Date Value Ref Range Status   04/24/2024 24 10 - 44 U/L Final     Coags: No results found for: "INR", "PROTIME", "PTT"  FLP:      Lab Results   Component Value Date    CHOL 167 04/24/2024    CHOL 191 10/12/2022     Lab Results   Component Value Date    HDL 51 04/24/2024    HDL 54 10/12/2022     Lab Results   Component Value Date    LDLCALC 96.8 " "04/24/2024    LDLCALC 115.8 10/12/2022     Lab Results   Component Value Date    TRIG 96 04/24/2024    TRIG 106 10/12/2022     Lab Results   Component Value Date    CHOLHDL 30.5 04/24/2024    CHOLHDL 28.3 10/12/2022      DM:      Hemoglobin A1C   Date Value Ref Range Status   04/24/2024 5.3 4.0 - 5.6 % Final     Comment:     ADA Screening Guidelines:  5.7-6.4%  Consistent with prediabetes  >or=6.5%  Consistent with diabetes    High levels of fetal hemoglobin interfere with the HbA1C  assay. Heterozygous hemoglobin variants (HbS, HgC, etc)do  not significantly interfere with this assay.   However, presence of multiple variants may affect accuracy.     10/12/2022 5.2 4.0 - 5.6 % Final     Comment:     ADA Screening Guidelines:  5.7-6.4%  Consistent with prediabetes  >or=6.5%  Consistent with diabetes    High levels of fetal hemoglobin interfere with the HbA1C  assay. Heterozygous hemoglobin variants (HbS, HgC, etc)do  not significantly interfere with this assay.   However, presence of multiple variants may affect accuracy.       LDL Cholesterol   Date Value Ref Range Status   04/24/2024 96.8 63.0 - 159.0 mg/dL Final     Comment:     The National Cholesterol Education Program (NCEP) has set the  following guidelines (reference values) for LDL Cholesterol:  Optimal.......................<130 mg/dL  Borderline High...............130-159 mg/dL  High..........................160-189 mg/dL  Very High.....................>190 mg/dL       Creatinine   Date Value Ref Range Status   04/24/2024 0.8 0.5 - 1.4 mg/dL Final     Thyroid:   TSH   Date Value Ref Range Status   10/12/2022 1.791 0.400 - 4.000 uIU/mL Final     Anemia: No results found for: "IRON", "TIBC", "FERRITIN", "VJCOAPTU28", "FOLATE"  Cardiac: No results found for: "TROPONINI", "CKTOTAL", "CKMB", "BNP"  Urinalysis:   Color, UA   Date Value Ref Range Status   04/24/2024 Yellow Yellow, Straw, Cecilia Final     Specific Gravity, UA   Date Value Ref Range Status "   04/24/2024 1.020 1.005 - 1.030 Final     Nitrite, UA   Date Value Ref Range Status   04/24/2024 Negative Negative Final     Ketones, UA   Date Value Ref Range Status   04/24/2024 Negative Negative Final     WBC, UA   Date Value Ref Range Status   04/24/2024 1 0 - 5 /hpf Final       Other Results:  EKG (my interpretation):     Radiology:  Mammo Digital Screening Bilat w/ Oswaldo  Narrative: Result:  Mammo Digital Screening Bilat w/ Oswaldo    History:  Patient is 59 y.o. and is seen for a screening mammogram.    Films Compared:  Compared to: 03/08/2023 Mammo Digital Screening Bilat w/ Oswaldo, 02/18/2022   US Breast Left Limited, 02/18/2022 Mammo Digital Diagnostic Left with   Oswaldo, and 10/01/2021 Mammo Digital Screening Bilat w/ Oswaldo     Findings:  This procedure was performed using tomosynthesis.   Computer-aided detection was utilized in the interpretation of this   examination.    The breasts have scattered areas of fibroglandular density. There is no   evidence of suspicious masses, microcalcifications or architectural   distortion.  Impression:    No mammographic evidence of malignancy.    BI-RADS Category 1: Negative    Recommendation:  Routine screening mammogram in 1 year is recommended.          Assessment/Plan     Chaya Tam is a 59 y.o. female with:  1. Preventative health care  Assessment & Plan:  Labs today  Get Shingrex, flu, COVID   Refer for eye and DEXa at [facility]     Orders:  -     Ambulatory referral/consult to Optometry; Future; Expected date: 04/24/2024  -     Lipid Panel; Future; Expected date: 04/24/2024  -     Hemoglobin A1C; Future; Expected date: 04/24/2024  -     Urinalysis; Future; Expected date: 04/24/2024  -     Comprehensive Metabolic Panel; Future; Expected date: 04/24/2024  -     CBC Auto Differential; Future; Expected date: 04/24/2024    2. Attention deficit disorder (ADD) without hyperactivity  Overview:  Dx'ed as an adult     Assessment & Plan:  Cont Vyvanse 40mg daily   Monitor  for side effects ie abdominal pain, N/V, headaches, insomnia, weight loss, appetite suppression   Take frequent holidays        3. Osteoporosis, unspecified osteoporosis type, unspecified pathological fracture presence  Overview:  DEXA:  7/2022 with LS -2.5 (-2.8 in 2018); left femoral neck T-1.6; right femoral neck T-2.0      Assessment & Plan:  Order DEXA now in July 2024 -  will start fosamax 70mg weekly if still has osteoporosis  Check Vit D level     Orders:  -     DXA Bone Density Axial Skeleton 1 or more sites; Future; Expected date: 04/24/2024    4. Palpitations  Assessment & Plan:  Cont Mag oxide 500mg daily       5. Vitamin D deficiency  -     Vitamin D; Future; Expected date: 04/24/2024    6. Herpes labialis  Assessment & Plan:  Refill Valtrex 2 gm po bid x 2 doses     Orders:  -     valACYclovir (VALTREX) 1000 MG tablet; Take 2 tablets (2,000 mg total) by mouth every 12 (twelve) hours.  Dispense: 30 tablet; Refill: 3    7. Adjustment insomnia  Assessment & Plan:  Refill Trazodone 50mg po qhs     Orders:  -     traZODone (DESYREL) 50 MG tablet; Take 1 tablet (50 mg total) by mouth every evening.  Dispense: 90 tablet; Refill: 3    8. Calculus of kidney  Overview:  017 dx'ed by Dr. Coffey with stent then removal and now with persistent in right lower kidney. Drinking 1 liter of sparkling water     Due to Calcium oxalate    Assessment & Plan:  Needs to drink 1-2 litersl     Drink 2 liters of water daily  Avoid high sodium and high oxalate foods ie beans, raspberries, chocolate, beets, grapefruit, processed meats, seeds, nuts, yams, potatoes, eggplant, tofu, wheat bran, spinach, pumpkin, rhubarb, swiss chard, soy, Vit C rich foods        9. Slow transit constipation  Assessment & Plan:  Resolved with Mag oxide 500mg daily   Drink 2 liters of water daily       10. Secondary hyperparathyroidism  Overview:  7/2022: Ca 10.3, PTH 15, Vit D 81    Assessment & Plan:  Check PTH today       11. Lumbar radiculopathy,  chronic  Assessment & Plan:  Take robaxin 50mg prn   Start yoga or pilates      12. History of colon polyps  Overview:  needs 7/2025 due to 1mm sessile transverse colon     Assessment & Plan:  Repeat in 7/2025       13. Hot flashes due to menopause  Assessment & Plan:   Cont compounded estradiol gel qhs and prometrium 200mg qhs         14. Seasonal allergic rhinitis due to pollen  Assessment & Plan:  Cont flonase 1 sq bid prn               I spent a total of 36 minutes on the day of the visit.This includes face to face time and non-face to face time preparing to see the patient (eg, review of tests), obtaining and/or reviewing separately obtained history, documenting clinical information in the electronic or other health record, independently interpreting results and communicating results to the patient/family/caregiver, or care coordinator.   Code Status:     Celeste Lopez MD

## 2024-04-25 RX ORDER — CHOLECALCIFEROL (VITAMIN D3) 25 MCG
1000 TABLET ORAL DAILY
COMMUNITY
Start: 2024-04-24

## 2024-04-25 NOTE — TELEPHONE ENCOUNTER
Pt sent portal msg indicating that she increased her Vit D intake to 1000 mg and believe she has a yeast infection, tried treating OTC with Monistat but still have symptoms.    Please review portal msg and respond,  Thank You.

## 2024-05-07 ENCOUNTER — PATIENT MESSAGE (OUTPATIENT)
Dept: PRIMARY CARE CLINIC | Facility: CLINIC | Age: 59
End: 2024-05-07
Payer: COMMERCIAL

## 2024-05-07 RX ORDER — LISDEXAMFETAMINE DIMESYLATE 40 MG/1
40 CAPSULE ORAL DAILY
Qty: 90 CAPSULE | Refills: 0 | Status: SHIPPED | OUTPATIENT
Start: 2024-05-07

## 2024-05-07 NOTE — TELEPHONE ENCOUNTER
No care due was identified.  [facility] Embedded Care Due Messages. Reference number: [id number].   5/07/2024 2:56:53 PM CDT

## 2024-05-09 ENCOUNTER — HOSPITAL ENCOUNTER (OUTPATIENT)
Dept: RADIOLOGY | Facility: HOSPITAL | Age: 59
Discharge: HOME | End: 2024-05-09
Attending: INTERNAL MEDICINE
Payer: COMMERCIAL

## 2024-05-09 DIAGNOSIS — M81.0 OSTEOPOROSIS, UNSPECIFIED OSTEOPOROSIS TYPE, UNSPECIFIED PATHOLOGICAL FRACTURE PRESENCE: ICD-10-CM

## 2024-05-09 PROCEDURE — 77080 DXA BONE DENSITY AXIAL: CPT | Mod: 26,,, | Performed by: INTERNAL MEDICINE

## 2024-05-09 PROCEDURE — 77080 DXA BONE DENSITY AXIAL: CPT | Mod: TC

## 2024-07-10 ENCOUNTER — PATIENT MESSAGE (OUTPATIENT)
Dept: PRIMARY CARE CLINIC | Facility: CLINIC | Age: 59
End: 2024-07-10
Payer: COMMERCIAL

## 2024-07-10 DIAGNOSIS — F41.9 ANXIETY: Primary | ICD-10-CM

## 2024-07-11 RX ORDER — DESVENLAFAXINE SUCCINATE 50 MG/1
50 TABLET, EXTENDED RELEASE ORAL DAILY
Qty: 90 TABLET | Refills: 2 | Status: SHIPPED | OUTPATIENT
Start: 2024-07-11

## 2024-07-11 NOTE — TELEPHONE ENCOUNTER
No care due was identified.  [facility] Embedded Care Due Messages. Reference number: [id number].   7/11/2024 7:47:45 AM CDT

## 2024-07-11 NOTE — TELEPHONE ENCOUNTER
Pt requesting PCP take over her Pristiq rx, states previously prescribed by psychiatrist. Pended     LOV with Jessica, Celeste Saldaña MD , 4/24/2024

## 2024-07-24 ENCOUNTER — OFFICE VISIT (OUTPATIENT)
Dept: OPTOMETRY | Facility: CLINIC | Age: 59
End: 2024-07-24
Payer: COMMERCIAL

## 2024-07-24 ENCOUNTER — OFFICE VISIT (OUTPATIENT)
Dept: PRIMARY CARE CLINIC | Facility: CLINIC | Age: 59
End: 2024-07-24
Payer: COMMERCIAL

## 2024-07-24 DIAGNOSIS — Z46.0 FITTING AND ADJUSTMENT OF SPECTACLES AND CONTACT LENSES: Primary | ICD-10-CM

## 2024-07-24 DIAGNOSIS — Z00.00 PREVENTATIVE HEALTH CARE: ICD-10-CM

## 2024-07-24 DIAGNOSIS — H52.4 MYOPIA WITH ASTIGMATISM AND PRESBYOPIA, BILATERAL: ICD-10-CM

## 2024-07-24 DIAGNOSIS — H52.13 MYOPIA WITH ASTIGMATISM AND PRESBYOPIA, BILATERAL: ICD-10-CM

## 2024-07-24 DIAGNOSIS — F98.8 ATTENTION DEFICIT DISORDER (ADD) WITHOUT HYPERACTIVITY: Primary | ICD-10-CM

## 2024-07-24 DIAGNOSIS — H52.203 MYOPIA WITH ASTIGMATISM AND PRESBYOPIA, BILATERAL: ICD-10-CM

## 2024-07-24 DIAGNOSIS — K64.4 EXTERNAL HEMORRHOIDS: ICD-10-CM

## 2024-07-24 DIAGNOSIS — H25.13 SENILE NUCLEAR SCLEROSIS, BILATERAL: Primary | ICD-10-CM

## 2024-07-24 PROCEDURE — 99999 PR PBB SHADOW E&M-EST. PATIENT-LVL II: CPT | Mod: PBBFAC,,, | Performed by: OPTOMETRIST

## 2024-07-24 PROCEDURE — 92310 CONTACT LENS FITTING OU: CPT | Mod: CSM,S$GLB,, | Performed by: OPTOMETRIST

## 2024-07-24 PROCEDURE — 99213 OFFICE O/P EST LOW 20 MIN: CPT | Mod: 95,,, | Performed by: INTERNAL MEDICINE

## 2024-07-24 PROCEDURE — 99999 PR PBB SHADOW E&M-EST. PATIENT-LVL III: CPT | Mod: PBBFAC,,, | Performed by: OPTOMETRIST

## 2024-07-24 PROCEDURE — 3044F HG A1C LEVEL LT 7.0%: CPT | Mod: CPTII,95,, | Performed by: INTERNAL MEDICINE

## 2024-07-24 RX ORDER — HYDROCORTISONE 25 MG/G
CREAM TOPICAL 2 TIMES DAILY
Qty: 20 G | Refills: 0 | Status: SHIPPED | OUTPATIENT
Start: 2024-07-24 | End: 2024-07-24

## 2024-07-24 RX ORDER — HYDROCORTISONE ACETATE PRAMOXINE HCL 2.5; 1 G/100G; G/100G
CREAM TOPICAL 3 TIMES DAILY
Qty: 30 G | Refills: 0 | Status: SHIPPED | OUTPATIENT
Start: 2024-07-24

## 2024-07-24 NOTE — PROGRESS NOTES
HPI     Contact Lens Fit     Additional comments: CL Fit           Comments    SHELLEY: 2022  Chief complaint (CC): Pt is having trouble with her near va with her   current multifocal CL's. Distance va is doing well.  Glasses? + not with her  Contacts? +  H/o eye surgery, injections or laser: -  H/o eye injury: -  Known eye conditions? -  Family h/o eye conditions? -  Eye gtts? -      (-) Flashes (-)  Floaters (-) Mucous   (-)  Tearing (-) Itching (-) Burning   (-) Headaches (-) Eye Pain/discomfort (-) Irritation   (-)  Redness (-) Double vision (+) Blurry vision    Diabetic? -  A1c? -            Last edited by Tevin Max on 7/24/2024  8:14 AM.            Assessment /Plan     For exam results, see Encounter Report.      Senile nuclear sclerosis, bilateral  Nuclear sclerotic cataract - not visually significant. Observe.    Myopia with astigmatism and presbyopia, bilateral  SRx released to patient. Patient educated on lens options.   CLRx trials released. Order trials. Ok for pt to pickup.   RTC DFE only      Pt is going on trip to [facility] in late August

## 2024-07-24 NOTE — PROGRESS NOTES
[facility] Progress Note      Audiovisual Visit  Location:  [place], [place]      Chief Complaint     No chief complaint on file.  External hemorrhoids and ADD    Subjective:      History of Present Illness:  Chaya Tam is a 59 y.o. female     ADD: doing well on Vyvanse 40mg daily prn; no side effects     External hemorrhoids:  c/o hemorrhoid associated constipation last week; has been using sitz baths with epsom salts but still painful and large (size of grape).    Past Medical History:  Past Medical History:   Diagnosis Date    Hypertension     Kidney stones     Secondary hyperparathyroidism 08/25/2022 7/2022: Ca 10.3, PTH 15, Vit D 81         Past Surgical History:  Past Surgical History:   Procedure Laterality Date    ARTHROSCOPY OF KNEE Left 01/18/2019    Procedure: ARTHROSCOPY, KNEE;  Surgeon: Jacek Silveira MD;  Location: [facility];  Service: Orthopedics;  Laterality: Left;    COLONOSCOPY N/A 07/30/2020    Procedure: COLONOSCOPY;  Surgeon: Eddie Leigh MD;  Location: [facility] ([address] [place] [address]);  Service: Endoscopy;  Laterality: N/A;  covid-7/27/20-[facility] [place]2nd floor[place]  instructions emailed to patient-BB    CRYOABLATION  04/14/1999    DILATION AND CURETTAGE OF UTERUS      EXCISION OF MEDIAL MENISCUS OF KNEE Left 01/18/2019    Procedure: MENISCECTOMY, KNEE, MEDIAL;  Surgeon: Jacek Silveira MD;  Location: [facility];  Service: Orthopedics;  Laterality: Left;    kidney stones         Allergies:  Review of patient's allergies indicates:   Allergen Reactions    Bactrim [sulfamethoxazole-trimethoprim] Rash       Home Medications:  Current Outpatient Medications   Medication Sig Dispense Refill    AA-prot-lysine-methio-vit C-B6 (A/G PRO) 50-12.5-16.7 mg Tab       desvenlafaxine succinate (PRISTIQ) 50 MG Tb24 Take 1 tablet (50 mg total) by mouth once daily. 90 tablet 2    fluticasone propionate (FLONASE) 50 mcg/actuation nasal spray 1 spray by Each Nostril route 2 (two) times daily.       hydrocortisone-pramoxine (ANALPRAM-HC) 2.5-1 % Crea Place rectally 3 (three) times daily. 30 g 0    krill-om-3-dha-epa-phospho-ast 1,000-230-60 mg Cap       Lactobacillus acidophilus (PROBIOTIC) 10 billion cell Cap       lisdexamfetamine (VYVANSE) 40 MG Cap Take 1 capsule (40 mg total) by mouth once daily. 90 capsule 0    magnesium oxide 500 mg Cap       progesterone (PROMETRIUM) 200 MG capsule Take 1 capsule (200 mg total) by mouth nightly. 90 capsule 2    traZODone (DESYREL) 50 MG tablet Take 1 tablet (50 mg total) by mouth every evening. 90 tablet 3    UNABLE TO FIND Apply 0.5 g topically every evening. Estradiol 2mg/g: Testosterone 1%(50mg/5g)  (1mg estradiol/ 5mg Test) nightly(1/2 gram nightly or two clicks nightly) 30 g 5     No current facility-administered medications for this visit.     Facility-Administered Medications Ordered in Other Visits   Medication Dose Route Frequency Provider Last Rate Last Admin    0.9%  NaCl infusion   Intravenous Continuous Jacek Silveira MD   New Bag at 07/30/20 0828    0.9%  NaCl infusion   Intravenous Continuous Jacek Silveira MD            Family History:  Family History   Problem Relation Name Age of Onset    Hypertension Father      Stroke Mother  76    Breast cancer Sister  48    Heart attack Neg Hx      Heart disease Neg Hx      Colon cancer Neg Hx      Ovarian cancer Neg Hx      Diabetes Neg Hx         Social History:  Social History     Tobacco Use    Smoking status: Never    Smokeless tobacco: Never   Substance Use Topics    Alcohol use: Yes     Alcohol/week: 6.0 standard drinks of alcohol     Types: 3 Glasses of wine, 3 Cans of beer per week     Comment: socially    Drug use: No       Review of Systems:  Pertinent positives and negatives listed in HPI. All other systems are reviewed and are negative.    Health Maintaince :   Health Maintenance Topics with due status: Not Due       Topic Last Completion Date    Colorectal Cancer Screening 07/30/2020     TETANUS VACCINE 08/06/2021    Influenza Vaccine 10/12/2022    Cervical Cancer Screening 04/26/2023    Mammogram 04/04/2024    Lipid Panel 04/24/2024    DEXA Scan 05/09/2024           Eye:   Dental:     Immunizations:     The 10-year ASCVD risk score (Colt LUONG, et al., 2019) is: 2.4%    Values used to calculate the score:      Age: 59 years      Sex: Female      Is Non- : No      Diabetic: No      Tobacco smoker: No      Systolic Blood Pressure: 120 mmHg      Is BP treated: No      HDL Cholesterol: 51 mg/dL      Total Cholesterol: 167 mg/dL      Objective:   LMP 08/18/2010 (Approximate)      There is no height or weight on file to calculate BMI.       Physical Examination:  General: Alert and awake in no apparent distress  Neuro:  AAOx3; cooperative and pleasant with no focal deficits    Laboratory:      Most Recent Data:  Lab Results   Component Value Date    WBC 6.11 04/24/2024    HGB 14.1 04/24/2024    HCT 42.0 04/24/2024     04/24/2024    CHOL 167 04/24/2024    TRIG 96 04/24/2024    HDL 51 04/24/2024    ALT 24 04/24/2024    AST 19 04/24/2024     04/24/2024    K 4.8 04/24/2024     04/24/2024    BUN 13 04/24/2024    CO2 28 04/24/2024    TSH 1.791 10/12/2022    HGBA1C 5.3 04/24/2024              CBC:   WBC   Date Value Ref Range Status   04/24/2024 6.11 3.90 - 12.70 K/uL Final     Hemoglobin   Date Value Ref Range Status   04/24/2024 14.1 12.0 - 16.0 g/dL Final     Hematocrit   Date Value Ref Range Status   04/24/2024 42.0 37.0 - 48.5 % Final     Platelets   Date Value Ref Range Status   04/24/2024 258 150 - 450 K/uL Final     MCV   Date Value Ref Range Status   04/24/2024 92 82 - 98 fL Final     RDW   Date Value Ref Range Status   04/24/2024 12.1 11.5 - 14.5 % Final     BMP:   Sodium   Date Value Ref Range Status   04/24/2024 140 136 - 145 mmol/L Final     Potassium   Date Value Ref Range Status   04/24/2024 4.8 3.5 - 5.1 mmol/L Final     Chloride   Date Value Ref Range  "Status   04/24/2024 104 95 - 110 mmol/L Final     CO2   Date Value Ref Range Status   04/24/2024 28 23 - 29 mmol/L Final     BUN   Date Value Ref Range Status   04/24/2024 13 6 - 20 mg/dL Final     Creatinine   Date Value Ref Range Status   04/24/2024 0.8 0.5 - 1.4 mg/dL Final     Glucose   Date Value Ref Range Status   04/24/2024 90 70 - 110 mg/dL Final     Calcium   Date Value Ref Range Status   04/24/2024 9.6 8.7 - 10.5 mg/dL Final     Magnesium   Date Value Ref Range Status   10/12/2022 2.2 1.6 - 2.6 mg/dL Final     LFTs:   Total Protein   Date Value Ref Range Status   04/24/2024 6.9 6.0 - 8.4 g/dL Final     Albumin   Date Value Ref Range Status   04/24/2024 4.2 3.5 - 5.2 g/dL Final     Total Bilirubin   Date Value Ref Range Status   04/24/2024 0.8 0.1 - 1.0 mg/dL Final     Comment:     For infants and newborns, interpretation of results should be based  on gestational age, weight and in agreement with clinical  observations.    Premature Infant recommended reference ranges:  Up to 24 hours.............<8.0 mg/dL  Up to 48 hours............<12.0 mg/dL  3-5 days..................<15.0 mg/dL  6-29 days.................<15.0 mg/dL       AST   Date Value Ref Range Status   04/24/2024 19 10 - 40 U/L Final     Alkaline Phosphatase   Date Value Ref Range Status   04/24/2024 61 55 - 135 U/L Final     ALT   Date Value Ref Range Status   04/24/2024 24 10 - 44 U/L Final     Coags: No results found for: "INR", "PROTIME", "PTT"  FLP:      Lab Results   Component Value Date    CHOL 167 04/24/2024    CHOL 191 10/12/2022     Lab Results   Component Value Date    HDL 51 04/24/2024    HDL 54 10/12/2022     Lab Results   Component Value Date    LDLCALC 96.8 04/24/2024    LDLCALC 115.8 10/12/2022     Lab Results   Component Value Date    TRIG 96 04/24/2024    TRIG 106 10/12/2022     Lab Results   Component Value Date    CHOLHDL 30.5 04/24/2024    CHOLHDL 28.3 10/12/2022      DM:      Hemoglobin A1C   Date Value Ref Range Status " "  04/24/2024 5.3 4.0 - 5.6 % Final     Comment:     ADA Screening Guidelines:  5.7-6.4%  Consistent with prediabetes  >or=6.5%  Consistent with diabetes    High levels of fetal hemoglobin interfere with the HbA1C  assay. Heterozygous hemoglobin variants (HbS, HgC, etc)do  not significantly interfere with this assay.   However, presence of multiple variants may affect accuracy.     10/12/2022 5.2 4.0 - 5.6 % Final     Comment:     ADA Screening Guidelines:  5.7-6.4%  Consistent with prediabetes  >or=6.5%  Consistent with diabetes    High levels of fetal hemoglobin interfere with the HbA1C  assay. Heterozygous hemoglobin variants (HbS, HgC, etc)do  not significantly interfere with this assay.   However, presence of multiple variants may affect accuracy.       LDL Cholesterol   Date Value Ref Range Status   04/24/2024 96.8 63.0 - 159.0 mg/dL Final     Comment:     The National Cholesterol Education Program (NCEP) has set the  following guidelines (reference values) for LDL Cholesterol:  Optimal.......................<130 mg/dL  Borderline High...............130-159 mg/dL  High..........................160-189 mg/dL  Very High.....................>190 mg/dL       Creatinine   Date Value Ref Range Status   04/24/2024 0.8 0.5 - 1.4 mg/dL Final     Thyroid:   TSH   Date Value Ref Range Status   10/12/2022 1.791 0.400 - 4.000 uIU/mL Final     Anemia: No results found for: "IRON", "TIBC", "FERRITIN", "[id number]", "FOLATE"  Cardiac: No results found for: "TROPONINI", "CKTOTAL", "CKMB", "BNP"  Urinalysis:   Color, UA   Date Value Ref Range Status   04/24/2024 Yellow Yellow, Straw, Cecilia Final     Specific Gravity, UA   Date Value Ref Range Status   04/24/2024 1.020 1.005 - 1.030 Final     Nitrite, UA   Date Value Ref Range Status   04/24/2024 Negative Negative Final     Ketones, UA   Date Value Ref Range Status   04/24/2024 Negative Negative Final     WBC, UA   Date Value Ref Range Status   04/24/2024 1 0 - 5 /hpf Final "       Other Results:  EKG (my interpretation):     Radiology:  DXA Bone Density Axial Skeleton 1 or more sites  Narrative: EXAMINATION:  DXA BONE DENSITY AXIAL SKELETON 1 OR MORE SITES    CLINICAL HISTORY:  Age-related osteoporosis without current pathological fracture.  60 y/o female with no history of fractures.  She had a hysterectomy at 45 y/o.  She is taking Vit D, Multivitamins and HRT.  She has a history of Renal Stones.  She does not exercise or smoke.    TECHNIQUE:  DXA specification: [facility] (S/N [id number])    Bone Mineral Density scanning was performed over the hip and lumbar spine.    Review of the images confirms satisfactory positioning and technique.    COMPARISON:  No Comparisons    FINDINGS:  Lumbar spine (L1-L4):               BMD is 0.798 g/cm2, T-score is -2.3, and Z-score is 0.9.    Total hip:                                BMD is 0.862 g/cm2, T-score is -0.7, and Z-score is 0.2.    Femoral neck:                          BMD is 0.640 g/cm2, T-score is -1.9, and Z-score is -0.6.    Distal 1/3 radius:                      BMD is 0.582 g/cm2, T-score is -1.8, and Z-score is -0.5.    FRAX:    8.8% risk of a major osteoporotic fracture in the next 10 years.    0.9% risk of hip fracture in the next 10 years.  Impression: *Low bone mass (Osteopenia); FRAX calculations do not support treatment as osteoporosis.  Wide variation in BMD between L3 and L4 vertebral bodies.    RECOMMENDATIONS:  *Daily calcium intake 3526-7907 mg, dietary sources preferred; Vitamin D 5709-6819 IU daily.  *Weight bearing exercise and fall precautions.  *If dedicated imaging is negative for vertebral fracture, then no additional treatment is recommended at this time. If positive for fracture, would treat as osteoporosis.  *Repeat BMD in 2 years.    EXPLANATION OF RESULTS:  T-score compares these results to the average bone density of a 20-29 year-old of the same gender.    Z-score compares this result to the  average bone density to people of the same age, gender, and race.    The amounts indicate the number of standard deviations above or below the mean.    * Osteoporosis is generally defined as having a T-score between less than or equal to -2.5.    * Low bone mass (osteopenia) is generally defined as having a T-score between -1.0 and -2.5.    * The normal range is generally defined as having a T-score greater than or equal to -1.0.    * Calculated FRAX scores for fracture risk prediction may not be accurate in the setting of certain clinical factors such as pharmacologic therapy for osteoporosis, prior fragility fractures, high dose glucocorticoid use.    Electronically signed by: Denisse Mariscal MD  Date:    05/17/2024  Time:    11:26          Assessment/Plan     Chaya Tam is a 59 y.o. female with:  1. Attention deficit disorder (ADD) without hyperactivity  Overview:  Dx'ed as an adult     Assessment & Plan:  Cont Vyvanse 40mg daily prn   Monitor for side effects ie abdominal pain, N/V, headaches, insomnia, weight loss, appetite suppression   Take frequent holidays    Schedule next appt in-person in 3 mo      2. External hemorrhoids  Assessment & Plan:  Cont Sitz baths with epsom salt  Analapram 2.5% HC 2.5mg cream tid- qid   Contact Dr. Lopez if severe pain, thrombosed or fails to go away  Hydrate 6-8 glasses of water and avoid constipation    Orders:  -     Discontinue: hydrocortisone 2.5 % cream; Apply topically 2 (two) times daily.  Dispense: 20 g; Refill: 0  -     hydrocortisone-pramoxine (ANALPRAM-HC) 2.5-1 % Crea; Place rectally 3 (three) times daily.  Dispense: 30 g; Refill: 0             I spent 17 min with this pt that includes face to face time and non-face to face time preparing to see the patient (eg, review of tests), obtaining and/or reviewing separately obtained history, documenting clinical information in the electronic or other health record, independently interpreting results and  communicating results to the patient/family/caregiver, or care coordinator.   Code Status:     Celeste Lopez MD

## 2024-07-24 NOTE — ASSESSMENT & PLAN NOTE
Cont Vyvanse 40mg daily prn   Monitor for side effects ie abdominal pain, N/V, headaches, insomnia, weight loss, appetite suppression   Take frequent holidays    Schedule next appt in-person in 3 mo

## 2024-07-24 NOTE — ASSESSMENT & PLAN NOTE
Cont Sitz baths with epsom salt  Analapram 2.5% HC 2.5mg cream tid- qid   Contact Dr. Lopez if severe pain, thrombosed or fails to go away  Hydrate 6-8 glasses of water and avoid constipation

## 2024-07-29 PROBLEM — Z00.00 PREVENTATIVE HEALTH CARE: Status: RESOLVED | Noted: 2024-04-24 | Resolved: 2024-07-29

## 2024-08-11 ENCOUNTER — PATIENT MESSAGE (OUTPATIENT)
Dept: OPTOMETRY | Facility: CLINIC | Age: 59
End: 2024-08-11
Payer: COMMERCIAL

## 2024-08-13 DIAGNOSIS — F98.8 ATTENTION DEFICIT DISORDER (ADD) WITHOUT HYPERACTIVITY: Primary | ICD-10-CM

## 2024-08-13 RX ORDER — LISDEXAMFETAMINE DIMESYLATE 40 MG/1
40 CAPSULE ORAL DAILY
Qty: 90 CAPSULE | Refills: 0 | Status: SHIPPED | OUTPATIENT
Start: 2024-08-13

## 2024-08-13 NOTE — TELEPHONE ENCOUNTER
No care due was identified.  [facility] Embedded Care Due Messages. Reference number: [id number].   8/13/2024 3:36:44 PM CDT

## 2024-09-09 ENCOUNTER — PATIENT MESSAGE (OUTPATIENT)
Dept: OBSTETRICS AND GYNECOLOGY | Facility: CLINIC | Age: 59
End: 2024-09-09
Payer: COMMERCIAL

## 2024-09-09 DIAGNOSIS — Z79.890 POSTMENOPAUSAL HORMONE REPLACEMENT THERAPY: ICD-10-CM

## 2024-09-09 RX ORDER — PROGESTERONE 200 MG/1
200 CAPSULE ORAL NIGHTLY
Qty: 90 CAPSULE | Refills: 0 | Status: SHIPPED | OUTPATIENT
Start: 2024-09-09 | End: 2025-09-09

## 2024-09-25 ENCOUNTER — OFFICE VISIT (OUTPATIENT)
Dept: OBSTETRICS AND GYNECOLOGY | Facility: CLINIC | Age: 59
End: 2024-09-25
Attending: OBSTETRICS & GYNECOLOGY
Payer: COMMERCIAL

## 2024-09-25 VITALS
DIASTOLIC BLOOD PRESSURE: 91 MMHG | SYSTOLIC BLOOD PRESSURE: 132 MMHG | WEIGHT: 158 LBS | HEART RATE: 72 BPM | BODY MASS INDEX: 25.39 KG/M2 | HEIGHT: 66 IN

## 2024-09-25 DIAGNOSIS — N39.41 URGE INCONTINENCE OF URINE: ICD-10-CM

## 2024-09-25 DIAGNOSIS — Z01.419 ENCOUNTER FOR GYNECOLOGICAL EXAMINATION WITHOUT ABNORMAL FINDING: Primary | ICD-10-CM

## 2024-09-25 DIAGNOSIS — Z79.890 POSTMENOPAUSAL HORMONE REPLACEMENT THERAPY: ICD-10-CM

## 2024-09-25 PROCEDURE — 87086 URINE CULTURE/COLONY COUNT: CPT | Performed by: OBSTETRICS & GYNECOLOGY

## 2024-09-25 PROCEDURE — 87077 CULTURE AEROBIC IDENTIFY: CPT | Performed by: OBSTETRICS & GYNECOLOGY

## 2024-09-25 PROCEDURE — 3008F BODY MASS INDEX DOCD: CPT | Mod: CPTII,S$GLB,, | Performed by: OBSTETRICS & GYNECOLOGY

## 2024-09-25 PROCEDURE — 1159F MED LIST DOCD IN RCRD: CPT | Mod: CPTII,S$GLB,, | Performed by: OBSTETRICS & GYNECOLOGY

## 2024-09-25 PROCEDURE — 99396 PREV VISIT EST AGE 40-64: CPT | Mod: S$GLB,,, | Performed by: OBSTETRICS & GYNECOLOGY

## 2024-09-25 PROCEDURE — 3075F SYST BP GE 130 - 139MM HG: CPT | Mod: CPTII,S$GLB,, | Performed by: OBSTETRICS & GYNECOLOGY

## 2024-09-25 PROCEDURE — 99999 PR PBB SHADOW E&M-EST. PATIENT-LVL III: CPT | Mod: PBBFAC,,, | Performed by: OBSTETRICS & GYNECOLOGY

## 2024-09-25 PROCEDURE — 87186 SC STD MICRODIL/AGAR DIL: CPT | Performed by: OBSTETRICS & GYNECOLOGY

## 2024-09-25 PROCEDURE — 3044F HG A1C LEVEL LT 7.0%: CPT | Mod: CPTII,S$GLB,, | Performed by: OBSTETRICS & GYNECOLOGY

## 2024-09-25 PROCEDURE — 87088 URINE BACTERIA CULTURE: CPT | Performed by: OBSTETRICS & GYNECOLOGY

## 2024-09-25 PROCEDURE — 3080F DIAST BP >= 90 MM HG: CPT | Mod: CPTII,S$GLB,, | Performed by: OBSTETRICS & GYNECOLOGY

## 2024-09-25 RX ORDER — PROGESTERONE 200 MG/1
200 CAPSULE ORAL NIGHTLY
Qty: 90 CAPSULE | Refills: 3 | Status: SHIPPED | OUTPATIENT
Start: 2024-09-25 | End: 2025-09-25

## 2024-09-25 NOTE — PROGRESS NOTES
Subjective:       Patient ID: Chaya Tam is a 59 y.o. female.    Chief Complaint:  Well Woman, Gynecologic Exam, and postmenopausal hormone replacement      History of Present Illness  Gynecologic Exam  Associated symptoms include frequency and urgency. Pertinent negatives include no abdominal pain, back pain, dysuria or headaches.       Chaya Tam is a 59 y.o. female  here for her annual GYN exam.    She is menopausal since about age 45 and on compounded HRT from [facility]. Doing well and wants to continue.  Current HRT:  Estradiol 2mg/g: Testosterone 1%(50mg/5g) (2 clicks or 0.5 g nightly)  Micronized progesterone 200mg HS  She does report increasing urge/stress incontinence recently.    denies break through bleeding.   denies vaginal itching or irritation.  Denies vaginal discharge.  She is sexually active. She has had1 partner for 4 years .     History of abnormal pap: Yes - many years ago  Last Pap: approximate date 2023 and was normal(and negative for HR HPV)  Last MMG: normal-2024:BI-RADS Category 1: Negative -routine follow-up in 12 months  Last Dexa:2024:*Low bone mass (Osteopenia); FRAX calculations do not support treatment as osteoporosis. Wide variation in BMD between L3 and L4 vertebral bodies.   Last Colonoscopy:  colonoscopy 4 years ago without abnormalities.  denies domestic violence. She does feel safe at home.     Past Medical History:   Diagnosis Date    Abnormal Pap smear of cervix     Hypertension     Kidney stones     Secondary hyperparathyroidism 2022: Ca 10.3, PTH 15, Vit D 81       Past Surgical History:   Procedure Laterality Date    ARTHROSCOPY OF KNEE Left 2019    Procedure: ARTHROSCOPY, KNEE;  Surgeon: Jacek Silveira MD;  Location: [facility];  Service: Orthopedics;  Laterality: Left;    COLONOSCOPY N/A 2020    Procedure: COLONOSCOPY;  Surgeon: Eddie Leigh MD;  Location: [address]);  Service: Endoscopy;   Laterality: N/A;  [place] [place]  instructions emailed to patient-BB    COLPOSCOPY      CRYOABLATION  04/14/1999    DILATION AND CURETTAGE OF UTERUS      EXCISION OF MEDIAL MENISCUS OF KNEE Left 01/18/2019    Procedure: MENISCECTOMY, KNEE, MEDIAL;  Surgeon: Jacek Silveira MD;  Location: [facility];  Service: Orthopedics;  Laterality: Left;    kidney stones       Social History     Socioeconomic History    Marital status:    Tobacco Use    Smoking status: Never    Smokeless tobacco: Never   Substance and Sexual Activity    Alcohol use: Yes     Alcohol/week: 6.0 standard drinks of alcohol     Types: 3 Glasses of wine, 3 Cans of beer per week     Comment: socially    Drug use: No    Sexual activity: Yes     Partners: Female     Birth control/protection: Post-menopausal     Comment: , new partner since 2020     Social Determinants of Health     Financial Resource Strain: Low Risk  (4/22/2024)    Overall Financial Resource Strain (CARDIA)     Difficulty of Paying Living Expenses: Not hard at all   Food Insecurity: No Food Insecurity (4/22/2024)    Hunger Vital Sign     Worried About Running Out of Food in the Last Year: Never true     Ran Out of Food in the Last Year: Never true   Transportation Needs: No Transportation Needs (4/22/2024)    PRAPARE - Transportation     Lack of Transportation (Medical): No     Lack of Transportation (Non-Medical): No   Physical Activity: Insufficiently Active (4/22/2024)    Exercise Vital Sign     Days of Exercise per Week: 1 day     Minutes of Exercise per Session: 20 min   Stress: No Stress Concern Present (4/22/2024)    [employer] [place] [employer] - Occupational Stress Questionnaire     Feeling of Stress : Only a little   Housing Stability: Unknown (4/22/2024)    Housing Stability Vital Sign     Unable to Pay for Housing in the Last Year: No     Family History   Problem Relation Name Age of Onset    Hypertension Father      Stroke Mother  76  "   Breast cancer Sister  48    Heart attack Neg Hx      Heart disease Neg Hx      Colon cancer Neg Hx      Ovarian cancer Neg Hx      Diabetes Neg Hx      Cancer Neg Hx      Uterine cancer Neg Hx      Cervical cancer Neg Hx       OB History          3    Para   2    Term   2            AB   1    Living   2         SAB   1    IAB        Ectopic        Multiple        Live Births   2                 BP (!) 132/91   Pulse 72   Ht 5' 6" (1.676 m)   Wt 71.7 kg (158 lb)   LMP 2010 (Approximate)   BMI 25.50 kg/m²         GYN & OB History  Patient's last menstrual period was 2010 (approximate).   Date of Last Pap: 2023    OB History    Para Term  AB Living   3 2 2   1 2   SAB IAB Ectopic Multiple Live Births   1       2      # Outcome Date GA Lbr Benson/2nd Weight Sex Type Anes PTL Lv   3 Term      Vag-Spont   LEONORA   2 Term      Vag-Spont   LEONORA   1 SAB                Review of Systems  Review of Systems   Constitutional:  Negative for activity change, appetite change, fatigue and unexpected weight change.   HENT: Negative.     Eyes:  Negative for visual disturbance.   Respiratory:  Negative for shortness of breath and wheezing.    Cardiovascular:  Negative for chest pain, palpitations and leg swelling.   Gastrointestinal:  Negative for abdominal pain, bloating and blood in stool.   Endocrine: Negative for diabetes and hair loss.   Genitourinary:  Positive for bladder incontinence, frequency and urgency. Negative for decreased libido, dyspareunia, dysuria, hot flashes and postmenopausal bleeding.   Musculoskeletal:  Negative for back pain and joint swelling.   Integumentary:  Negative for acne, hair changes and nipple discharge.   Neurological:  Negative for headaches.   Hematological:  Does not bruise/bleed easily.   Psychiatric/Behavioral:  Negative for depression and sleep disturbance. The patient is not nervous/anxious.    Breast: Negative for mastodynia and nipple " discharge          Objective:      Physical Exam:   Constitutional: She is oriented to person, place, and time. She appears well-developed and well-nourished.    HENT:   Head: Normocephalic and atraumatic.    Eyes: Pupils are equal, round, and reactive to light. EOM are normal.     Cardiovascular:  Normal rate and regular rhythm.             Pulmonary/Chest: Effort normal and breath sounds normal.   BREASTS:  no mass, no tenderness, no deformity and no retraction. Right breast exhibits no inverted nipple, no mass, no nipple discharge, no skin change, no tenderness, no bleeding and no swelling. Left breast exhibits no inverted nipple, no mass, no nipple discharge, no skin change, no tenderness, no bleeding and no swelling. Breasts are symmetrical.              Abdominal: Soft. Bowel sounds are normal.     Genitourinary:    Pelvic exam was performed with patient supine.      Genitourinary Comments: PELVIC: Normal external genitalia without lesions.  Normal hair distribution.  Adequate perineal body, normal urethral meatus.  Vagina moist and Moderately rugated without lesions or discharge.  Cervix pink, without lesions, with small endocervical polyp, no discharge or tenderness.  No significant cystocele or rectocele.  Bimanual exam shows uterus to be normal size, regular, mobile and nontender.  Adnexa without masses or tenderness.                   Musculoskeletal: Normal range of motion and moves all extremeties.       Neurological: She is alert and oriented to person, place, and time.    Skin: Skin is warm and dry.    Psychiatric: She has a normal mood and affect.              Assessment:        1. Encounter for gynecological examination without abnormal finding    2. Postmenopausal hormone replacement therapy    3. Urge incontinence of urine                Plan:        Problem List Items Addressed This Visit    None  Visit Diagnoses       Encounter for gynecological examination without abnormal finding    -  Primary     Postmenopausal hormone replacement therapy        Relevant Medications    progesterone (PROMETRIUM) 200 MG capsule    UNABLE TO FIND:Estradiol 2mg/g: Testosterone 1%(50mg/5g) (2 clicks or 0.5 g nightly)     Urge incontinence of urine        Relevant Orders    Urine culture             Follow up in about 1 year (around 9/25/2025).

## 2024-09-27 DIAGNOSIS — N39.0 UTI (URINARY TRACT INFECTION), UNCOMPLICATED: Primary | ICD-10-CM

## 2024-09-28 LAB
BACTERIA UR CULT: ABNORMAL
BACTERIA UR CULT: ABNORMAL

## 2024-09-30 ENCOUNTER — TELEPHONE (OUTPATIENT)
Dept: OBSTETRICS AND GYNECOLOGY | Facility: CLINIC | Age: 59
End: 2024-09-30
Payer: COMMERCIAL

## 2024-09-30 RX ORDER — AMOXICILLIN AND CLAVULANATE POTASSIUM 875; 125 MG/1; MG/1
1 TABLET, FILM COATED ORAL 2 TIMES DAILY
Qty: 14 TABLET | Refills: 0 | Status: SHIPPED | OUTPATIENT
Start: 2024-09-30 | End: 2024-10-07

## 2024-09-30 NOTE — TELEPHONE ENCOUNTER
----- Message from Ariadna Parada MD sent at 9/30/2024  8:12 AM CDT -----  Please let her know that I sent in a RX to treat UTI

## 2024-09-30 NOTE — TELEPHONE ENCOUNTER
Called patient to inform per Dr Parada urine came back with infection and antibiotic has been sent to [facility]'s to treat. Patient said ok thanks.

## 2024-11-19 DIAGNOSIS — F98.8 ATTENTION DEFICIT DISORDER (ADD) WITHOUT HYPERACTIVITY: ICD-10-CM

## 2024-11-19 RX ORDER — LISDEXAMFETAMINE DIMESYLATE 40 MG/1
40 CAPSULE ORAL DAILY
Qty: 90 CAPSULE | Refills: 0 | Status: SHIPPED | OUTPATIENT
Start: 2024-11-19

## 2024-11-19 NOTE — TELEPHONE ENCOUNTER
No care due was identified.  [facility] Embedded Care Due Messages. Reference number: [id number].   11/19/2024 3:14:12 PM CST

## 2024-11-25 ENCOUNTER — PATIENT MESSAGE (OUTPATIENT)
Dept: PRIMARY CARE CLINIC | Facility: CLINIC | Age: 59
End: 2024-11-25
Payer: COMMERCIAL

## 2024-11-26 ENCOUNTER — OFFICE VISIT (OUTPATIENT)
Dept: PRIMARY CARE CLINIC | Facility: CLINIC | Age: 59
End: 2024-11-26
Payer: COMMERCIAL

## 2024-11-26 DIAGNOSIS — K64.4 EXTERNAL HEMORRHOIDS: ICD-10-CM

## 2024-11-26 DIAGNOSIS — F98.8 ATTENTION DEFICIT DISORDER (ADD) WITHOUT HYPERACTIVITY: Primary | ICD-10-CM

## 2024-11-26 PROCEDURE — 99214 OFFICE O/P EST MOD 30 MIN: CPT | Mod: 95,,, | Performed by: INTERNAL MEDICINE

## 2024-11-26 PROCEDURE — G2211 COMPLEX E/M VISIT ADD ON: HCPCS | Mod: 95,,, | Performed by: INTERNAL MEDICINE

## 2024-11-26 PROCEDURE — 3044F HG A1C LEVEL LT 7.0%: CPT | Mod: CPTII,95,, | Performed by: INTERNAL MEDICINE

## 2024-11-26 NOTE — PROGRESS NOTES
[facility] Progress Note      Audiovisual Visit  Location:  [place], [place]      Chief Complaint     No chief complaint on file.  External hemorrhoids and ADD    Subjective:      History of Present Illness:  Chaya Tam is a 59 y.o. female     ADD: doing well on Vyvanse 40mg daily prn; no side effects; taking holidays from the medication    External hemorrhoids:  resolved with application of Analapram and sitz baths  Past Medical History:  Past Medical History:   Diagnosis Date    Abnormal Pap smear of cervix     Hypertension     Kidney stones     Secondary hyperparathyroidism 08/25/2022 7/2022: Ca 10.3, PTH 15, Vit D 81         Past Surgical History:  Past Surgical History:   Procedure Laterality Date    ARTHROSCOPY OF KNEE Left 01/18/2019    Procedure: ARTHROSCOPY, KNEE;  Surgeon: Jacek Silveira MD;  Location: [facility];  Service: Orthopedics;  Laterality: Left;    COLONOSCOPY N/A 07/30/2020    Procedure: COLONOSCOPY;  Surgeon: Eddie Leigh MD;  Location: [facility] ([address] [place] [address]);  Service: Endoscopy;  Laterality: N/A;  covid-7/27/20-[facility]-2nd floor-[facility]  instructions emailed to patient-BB    COLPOSCOPY      CRYOABLATION  04/14/1999    DILATION AND CURETTAGE OF UTERUS      EXCISION OF MEDIAL MENISCUS OF KNEE Left 01/18/2019    Procedure: MENISCECTOMY, KNEE, MEDIAL;  Surgeon: Jacek Silveira MD;  Location: [facility];  Service: Orthopedics;  Laterality: Left;    kidney stones         Allergies:  Review of patient's allergies indicates:   Allergen Reactions    Bactrim [sulfamethoxazole-trimethoprim] Rash       Home Medications:  Current Outpatient Medications   Medication Sig Dispense Refill    AA-prot-lysine-methio-vit C-B6 (A/G PRO) 50-12.5-16.7 mg Tab       desvenlafaxine succinate (PRISTIQ) 50 MG Tb24 Take 1 tablet (50 mg total) by mouth once daily. 90 tablet 2    fluticasone propionate (FLONASE) 50 mcg/actuation nasal spray 1 spray by Each Nostril route 2 (two) times daily.       hydrocortisone-pramoxine (ANALPRAM-HC) 2.5-1 % Crea Place rectally 3 (three) times daily. 30 g 0    krill-om-3-dha-epa-phospho-ast 1,000-230-60 mg Cap       Lactobacillus acidophilus (PROBIOTIC) 10 billion cell Cap       lisdexamfetamine (VYVANSE) 40 MG Cap Take 1 capsule (40 mg total) by mouth once daily. 90 capsule 0    magnesium oxide 500 mg Cap       progesterone (PROMETRIUM) 200 MG capsule Take 1 capsule (200 mg total) by mouth nightly. 90 capsule 3    traZODone (DESYREL) 50 MG tablet Take 1 tablet (50 mg total) by mouth every evening. 90 tablet 3    UNABLE TO FIND Apply 0.5 g topically every evening. Estradiol 2mg/g: Testosterone 1%(50mg/5g)  (1mg estradiol/ 5mg Test) nightly(1/2 gram nightly or two clicks nightly) 30 g 5     No current facility-administered medications for this visit.     Facility-Administered Medications Ordered in Other Visits   Medication Dose Route Frequency Provider Last Rate Last Admin    0.9%  NaCl infusion   Intravenous Continuous Jacek Silveira MD   New Bag at 07/30/20 0828    0.9%  NaCl infusion   Intravenous Continuous Jacek Silveira MD            Family History:  Family History   Problem Relation Name Age of Onset    Hypertension Father      Stroke Mother  76    Breast cancer Sister  48    Heart attack Neg Hx      Heart disease Neg Hx      Colon cancer Neg Hx      Ovarian cancer Neg Hx      Diabetes Neg Hx      Cancer Neg Hx      Uterine cancer Neg Hx      Cervical cancer Neg Hx         Social History:  Social History     Tobacco Use    Smoking status: Never    Smokeless tobacco: Never   Substance Use Topics    Alcohol use: Yes     Alcohol/week: 6.0 standard drinks of alcohol     Types: 3 Glasses of wine, 3 Cans of beer per week     Comment: socially    Drug use: No       Review of Systems:  Pertinent positives and negatives listed in HPI. All other systems are reviewed and are negative.    Health Maintaince :   Health Maintenance Topics with due status: Not Due        Topic Last Completion Date    Colorectal Cancer Screening 07/30/2020    TETANUS VACCINE 08/06/2021    Cervical Cancer Screening 04/26/2023    Mammogram 04/04/2024    Hemoglobin A1c (Diabetic Prevention Screening) 04/24/2024    Lipid Panel 04/24/2024    DEXA Scan 05/09/2024    RSV Vaccine (Age 60+ and Pregnant patients) Not Due           Eye:   Dental:     Immunizations:     The 10-year ASCVD risk score (Colt LUONG, et al., 2019) is: 2.9%    Values used to calculate the score:      Age: 59 years      Sex: Female      Is Non- : No      Diabetic: No      Tobacco smoker: No      Systolic Blood Pressure: 132 mmHg      Is BP treated: No      HDL Cholesterol: 51 mg/dL      Total Cholesterol: 167 mg/dL      Objective:   LMP 08/18/2010 (Approximate)      There is no height or weight on file to calculate BMI.       Physical Examination:  General: Alert and awake in no apparent distress  Neuro:  AAOx3; cooperative and pleasant with no focal deficits    Laboratory:      Most Recent Data:  Lab Results   Component Value Date    WBC 6.11 04/24/2024    HGB 14.1 04/24/2024    HCT 42.0 04/24/2024     04/24/2024    CHOL 167 04/24/2024    TRIG 96 04/24/2024    HDL 51 04/24/2024    ALT 24 04/24/2024    AST 19 04/24/2024     04/24/2024    K 4.8 04/24/2024     04/24/2024    BUN 13 04/24/2024    CO2 28 04/24/2024    TSH 1.791 10/12/2022    HGBA1C 5.3 04/24/2024              CBC:   WBC   Date Value Ref Range Status   04/24/2024 6.11 3.90 - 12.70 K/uL Final     Hemoglobin   Date Value Ref Range Status   04/24/2024 14.1 12.0 - 16.0 g/dL Final     Hematocrit   Date Value Ref Range Status   04/24/2024 42.0 37.0 - 48.5 % Final     Platelets   Date Value Ref Range Status   04/24/2024 258 150 - 450 K/uL Final     MCV   Date Value Ref Range Status   04/24/2024 92 82 - 98 fL Final     RDW   Date Value Ref Range Status   04/24/2024 12.1 11.5 - 14.5 % Final     BMP:   Sodium   Date Value Ref Range Status  "  04/24/2024 140 136 - 145 mmol/L Final     Potassium   Date Value Ref Range Status   04/24/2024 4.8 3.5 - 5.1 mmol/L Final     Chloride   Date Value Ref Range Status   04/24/2024 104 95 - 110 mmol/L Final     CO2   Date Value Ref Range Status   04/24/2024 28 23 - 29 mmol/L Final     BUN   Date Value Ref Range Status   04/24/2024 13 6 - 20 mg/dL Final     Creatinine   Date Value Ref Range Status   04/24/2024 0.8 0.5 - 1.4 mg/dL Final     Glucose   Date Value Ref Range Status   04/24/2024 90 70 - 110 mg/dL Final     Calcium   Date Value Ref Range Status   04/24/2024 9.6 8.7 - 10.5 mg/dL Final     Magnesium   Date Value Ref Range Status   10/12/2022 2.2 1.6 - 2.6 mg/dL Final     LFTs:   Total Protein   Date Value Ref Range Status   04/24/2024 6.9 6.0 - 8.4 g/dL Final     Albumin   Date Value Ref Range Status   04/24/2024 4.2 3.5 - 5.2 g/dL Final     Total Bilirubin   Date Value Ref Range Status   04/24/2024 0.8 0.1 - 1.0 mg/dL Final     Comment:     For infants and newborns, interpretation of results should be based  on gestational age, weight and in agreement with clinical  observations.    Premature Infant recommended reference ranges:  Up to 24 hours.............<8.0 mg/dL  Up to 48 hours............<12.0 mg/dL  3-5 days..................<15.0 mg/dL  6-29 days.................<15.0 mg/dL       AST   Date Value Ref Range Status   04/24/2024 19 10 - 40 U/L Final     Alkaline Phosphatase   Date Value Ref Range Status   04/24/2024 61 55 - 135 U/L Final     ALT   Date Value Ref Range Status   04/24/2024 24 10 - 44 U/L Final     Coags: No results found for: "INR", "PROTIME", "PTT"  FLP:      Lab Results   Component Value Date    CHOL 167 04/24/2024    CHOL 191 10/12/2022     Lab Results   Component Value Date    HDL 51 04/24/2024    HDL 54 10/12/2022     Lab Results   Component Value Date    LDLCALC 96.8 04/24/2024    LDLCALC 115.8 10/12/2022     Lab Results   Component Value Date    TRIG 96 04/24/2024    TRIG 106 " "10/12/2022     Lab Results   Component Value Date    CHOLHDL 30.5 04/24/2024    CHOLHDL 28.3 10/12/2022      DM:      Hemoglobin A1C   Date Value Ref Range Status   04/24/2024 5.3 4.0 - 5.6 % Final     Comment:     ADA Screening Guidelines:  5.7-6.4%  Consistent with prediabetes  >or=6.5%  Consistent with diabetes    High levels of fetal hemoglobin interfere with the HbA1C  assay. Heterozygous hemoglobin variants (HbS, HgC, etc)do  not significantly interfere with this assay.   However, presence of multiple variants may affect accuracy.     10/12/2022 5.2 4.0 - 5.6 % Final     Comment:     ADA Screening Guidelines:  5.7-6.4%  Consistent with prediabetes  >or=6.5%  Consistent with diabetes    High levels of fetal hemoglobin interfere with the HbA1C  assay. Heterozygous hemoglobin variants (HbS, HgC, etc)do  not significantly interfere with this assay.   However, presence of multiple variants may affect accuracy.       LDL Cholesterol   Date Value Ref Range Status   04/24/2024 96.8 63.0 - 159.0 mg/dL Final     Comment:     The National Cholesterol Education Program (NCEP) has set the  following guidelines (reference values) for LDL Cholesterol:  Optimal.......................<130 mg/dL  Borderline High...............130-159 mg/dL  High..........................160-189 mg/dL  Very High.....................>190 mg/dL       Creatinine   Date Value Ref Range Status   04/24/2024 0.8 0.5 - 1.4 mg/dL Final     Thyroid:   TSH   Date Value Ref Range Status   10/12/2022 1.791 0.400 - 4.000 uIU/mL Final     Anemia: No results found for: "IRON", "TIBC", "FERRITIN", "[id number]", "FOLATE"  Cardiac: No results found for: "TROPONINI", "CKTOTAL", "CKMB", "BNP"  Urinalysis:   Urine Culture, Routine   Date Value Ref Range Status   09/25/2024 ENTEROCOCCUS FAECALIS  10,000 - 49,999 cfu/ml   (A)  Final   09/25/2024 (A)  Final    COAGULASE-NEGATIVE STAPHYLOCOCCUS SPECIES  10,000 - 49,999 cfu/ml  Susceptibility testing not routinely " performed.       Color, UA   Date Value Ref Range Status   04/24/2024 Yellow Yellow, Straw, Cecilia Final     Specific Gravity, UA   Date Value Ref Range Status   04/24/2024 1.020 1.005 - 1.030 Final     Nitrite, UA   Date Value Ref Range Status   04/24/2024 Negative Negative Final     Ketones, UA   Date Value Ref Range Status   04/24/2024 Negative Negative Final     WBC, UA   Date Value Ref Range Status   04/24/2024 1 0 - 5 /hpf Final       Other Results:  EKG (my interpretation):     Radiology:  DXA Bone Density Axial Skeleton 1 or more sites  Narrative: EXAMINATION:  DXA BONE DENSITY AXIAL SKELETON 1 OR MORE SITES    CLINICAL HISTORY:  Age-related osteoporosis without current pathological fracture.  58 y/o female with no history of fractures.  She had a hysterectomy at 45 y/o.  She is taking Vit D, Multivitamins and HRT.  She has a history of Renal Stones.  She does not exercise or smoke.    TECHNIQUE:  DXA specification: [facility] (S/N [id number])    Bone Mineral Density scanning was performed over the hip and lumbar spine.    Review of the images confirms satisfactory positioning and technique.    COMPARISON:  No Comparisons    FINDINGS:  Lumbar spine (L1-L4):               BMD is 0.798 g/cm2, T-score is -2.3, and Z-score is 0.9.    Total hip:                                BMD is 0.862 g/cm2, T-score is -0.7, and Z-score is 0.2.    Femoral neck:                          BMD is 0.640 g/cm2, T-score is -1.9, and Z-score is -0.6.    Distal 1/3 radius:                      BMD is 0.582 g/cm2, T-score is -1.8, and Z-score is -0.5.    FRAX:    8.8% risk of a major osteoporotic fracture in the next 10 years.    0.9% risk of hip fracture in the next 10 years.  Impression: *Low bone mass (Osteopenia); FRAX calculations do not support treatment as osteoporosis.  Wide variation in BMD between L3 and L4 vertebral bodies.    RECOMMENDATIONS:  *Daily calcium intake 7847-2315 mg, dietary sources preferred;  Vitamin D 0435-1437 IU daily.  *Weight bearing exercise and fall precautions.  *If dedicated imaging is negative for vertebral fracture, then no additional treatment is recommended at this time. If positive for fracture, would treat as osteoporosis.  *Repeat BMD in 2 years.    EXPLANATION OF RESULTS:  T-score compares these results to the average bone density of a 20-29 year-old of the same gender.    Z-score compares this result to the average bone density to people of the same age, gender, and race.    The amounts indicate the number of standard deviations above or below the mean.    * Osteoporosis is generally defined as having a T-score between less than or equal to -2.5.    * Low bone mass (osteopenia) is generally defined as having a T-score between -1.0 and -2.5.    * The normal range is generally defined as having a T-score greater than or equal to -1.0.    * Calculated FRAX scores for fracture risk prediction may not be accurate in the setting of certain clinical factors such as pharmacologic therapy for osteoporosis, prior fragility fractures, high dose glucocorticoid use.    Electronically signed by: Denisse Mariscal MD  Date:    05/17/2024  Time:    11:26          Assessment/Plan     Chaya Tam is a 59 y.o. female with:  1. Attention deficit disorder (ADD) without hyperactivity  Overview:  Dx'ed as an adult     Assessment & Plan:  Cont Vyvanse 40mg daily prn   Monitor for side effects ie abdominal pain, N/V, headaches, insomnia, weight loss, appetite suppression   Take frequent holidays    Schedule next appt in-person in 3 mo      2. External hemorrhoids  Assessment & Plan:  Resolved with sitz baths with epsom salt  Analapram 2.5% HC 2.5mg cream tid- qid   Contact Dr. Lopez if severe pain, thrombosed or fails to go away  Hydrate 6-8 glasses of water and avoid constipation                 I spent with this pt that includes face to face time and non-face to face time preparing to see the patient (eg,  review of tests), obtaining and/or reviewing separately obtained history, documenting clinical information in the electronic or other health record, independently interpreting results and communicating results to the patient/family/caregiver, or care coordinator.   Code Status:     Celeste oLpez MD

## 2024-11-26 NOTE — ASSESSMENT & PLAN NOTE
Resolved with sitz baths with epsom salt  Analapram 2.5% HC 2.5mg cream tid- qid   Contact Dr. Lopez if severe pain, thrombosed or fails to go away  Hydrate 6-8 glasses of water and avoid constipation

## 2024-12-20 DIAGNOSIS — Z79.890 POSTMENOPAUSAL HORMONE REPLACEMENT THERAPY: ICD-10-CM

## 2024-12-20 RX ORDER — PROGESTERONE 200 MG/1
CAPSULE ORAL NIGHTLY
Qty: 90 CAPSULE | Refills: 3 | Status: SHIPPED | OUTPATIENT
Start: 2024-12-20

## 2024-12-21 NOTE — TELEPHONE ENCOUNTER
Refill Decision Note   Chaya Tam  is requesting a refill authorization.  Brief Assessment and Rationale for Refill:  Approve     Medication Therapy Plan:        Comments:     Note composed:10:07 PM 12/20/2024

## 2025-02-05 ENCOUNTER — PATIENT MESSAGE (OUTPATIENT)
Dept: OBSTETRICS AND GYNECOLOGY | Facility: CLINIC | Age: 60
End: 2025-02-05
Payer: COMMERCIAL

## 2025-02-05 DIAGNOSIS — Z79.890 POSTMENOPAUSAL HORMONE REPLACEMENT THERAPY: ICD-10-CM

## 2025-02-25 ENCOUNTER — OFFICE VISIT (OUTPATIENT)
Dept: PRIMARY CARE CLINIC | Facility: CLINIC | Age: 60
End: 2025-02-25
Payer: COMMERCIAL

## 2025-02-25 VITALS
WEIGHT: 161.19 LBS | BODY MASS INDEX: 25.9 KG/M2 | HEART RATE: 78 BPM | DIASTOLIC BLOOD PRESSURE: 80 MMHG | OXYGEN SATURATION: 99 % | SYSTOLIC BLOOD PRESSURE: 122 MMHG | HEIGHT: 66 IN

## 2025-02-25 DIAGNOSIS — F51.02 ADJUSTMENT INSOMNIA: ICD-10-CM

## 2025-02-25 DIAGNOSIS — Z23 NEED FOR VACCINATION: ICD-10-CM

## 2025-02-25 DIAGNOSIS — M81.0 OSTEOPOROSIS, UNSPECIFIED OSTEOPOROSIS TYPE, UNSPECIFIED PATHOLOGICAL FRACTURE PRESENCE: ICD-10-CM

## 2025-02-25 DIAGNOSIS — N95.1 HOT FLASHES DUE TO MENOPAUSE: ICD-10-CM

## 2025-02-25 DIAGNOSIS — R00.2 PALPITATIONS: ICD-10-CM

## 2025-02-25 DIAGNOSIS — K59.01 SLOW TRANSIT CONSTIPATION: ICD-10-CM

## 2025-02-25 DIAGNOSIS — F98.8 ATTENTION DEFICIT DISORDER (ADD) WITHOUT HYPERACTIVITY: Primary | ICD-10-CM

## 2025-02-25 DIAGNOSIS — M62.830 BACK SPASM: ICD-10-CM

## 2025-02-25 DIAGNOSIS — Z00.00 PREVENTATIVE HEALTH CARE: ICD-10-CM

## 2025-02-25 PROCEDURE — 90471 IMMUNIZATION ADMIN: CPT | Mod: S$GLB,,, | Performed by: INTERNAL MEDICINE

## 2025-02-25 PROCEDURE — 3079F DIAST BP 80-89 MM HG: CPT | Mod: CPTII,S$GLB,, | Performed by: INTERNAL MEDICINE

## 2025-02-25 PROCEDURE — 3008F BODY MASS INDEX DOCD: CPT | Mod: CPTII,S$GLB,, | Performed by: INTERNAL MEDICINE

## 2025-02-25 PROCEDURE — 90656 IIV3 VACC NO PRSV 0.5 ML IM: CPT | Mod: S$GLB,,, | Performed by: INTERNAL MEDICINE

## 2025-02-25 PROCEDURE — 99214 OFFICE O/P EST MOD 30 MIN: CPT | Mod: 25,S$GLB,, | Performed by: INTERNAL MEDICINE

## 2025-02-25 PROCEDURE — 1159F MED LIST DOCD IN RCRD: CPT | Mod: CPTII,S$GLB,, | Performed by: INTERNAL MEDICINE

## 2025-02-25 PROCEDURE — 3074F SYST BP LT 130 MM HG: CPT | Mod: CPTII,S$GLB,, | Performed by: INTERNAL MEDICINE

## 2025-02-25 PROCEDURE — 99999 PR PBB SHADOW E&M-EST. PATIENT-LVL IV: CPT | Mod: PBBFAC,,, | Performed by: INTERNAL MEDICINE

## 2025-02-25 RX ORDER — TRIAMCINOLONE ACETONIDE 1 MG/G
PASTE DENTAL
COMMUNITY
Start: 2025-01-07

## 2025-02-25 RX ORDER — MUPIROCIN 20 MG/G
OINTMENT TOPICAL
COMMUNITY
Start: 2025-01-07

## 2025-02-25 RX ORDER — LISDEXAMFETAMINE DIMESYLATE 40 MG/1
40 CAPSULE ORAL DAILY
Qty: 90 CAPSULE | Refills: 0 | Status: SHIPPED | OUTPATIENT
Start: 2025-02-25

## 2025-02-25 RX ORDER — METHOCARBAMOL 500 MG/1
500 TABLET, FILM COATED ORAL 2 TIMES DAILY PRN
Qty: 40 TABLET | Refills: 1 | Status: SHIPPED | OUTPATIENT
Start: 2025-02-25 | End: 2025-04-06

## 2025-02-25 NOTE — PROGRESS NOTES
[facility]ediatric[facility] Progress Note    Chief Complaint     No chief complaint on file.  External hemorrhoids and ADD    Subjective:      History of Present Illness:  Chaya Tam is a 59 y.o. female     ADD: doing well on Vyvanse 40mg daily prn; no side effects; taking holidays from the medication    External hemorrhoids:  resolved with application of Analapram and sitz baths  Past Medical History:  Past Medical History:   Diagnosis Date    Abnormal Pap smear of cervix     Hypertension     Kidney stones     Secondary hyperparathyroidism 08/25/2022 7/2022: Ca 10.3, PTH 15, Vit D 81         Past Surgical History:  Past Surgical History:   Procedure Laterality Date    ARTHROSCOPY OF KNEE Left 01/18/2019    Procedure: ARTHROSCOPY, KNEE;  Surgeon: Jacek Silveira MD;  Location: [facility];  Service: Orthopedics;  Laterality: Left;    COLONOSCOPY N/A 07/30/2020    Procedure: COLONOSCOPY;  Surgeon: Eddie Leigh MD;  Location: [address]);  Service: Endoscopy;  Laterality: N/A;  covid-7/27/20[place] [place]  instructions emailed to patient-BB    COLPOSCOPY      CRYOABLATION  04/14/1999    DILATION AND CURETTAGE OF UTERUS      EXCISION OF MEDIAL MENISCUS OF KNEE Left 01/18/2019    Procedure: MENISCECTOMY, KNEE, MEDIAL;  Surgeon: Jacek Silveira MD;  Location: [facility];  Service: Orthopedics;  Laterality: Left;    kidney stones         Allergies:  Review of patient's allergies indicates:   Allergen Reactions    Bactrim [sulfamethoxazole-trimethoprim] Rash       Home Medications:  Current Outpatient Medications   Medication Sig Dispense Refill    AA-prot-lysine-methio-vit C-B6 (A/G PRO) 50-12.5-16.7 mg Tab       desvenlafaxine succinate (PRISTIQ) 50 MG Tb24 Take 1 tablet (50 mg total) by mouth once daily. 90 tablet 2    fluticasone propionate (FLONASE) 50 mcg/actuation nasal spray 1 spray by Each Nostril route 2 (two) times daily.      hydrocortisone-pramoxine (ANALPRAM-HC) 2.5-1 % Crea  Place rectally 3 (three) times daily. 30 g 0    krill-om-3-dha-epa-phospho-ast 1,000-230-60 mg Cap       Lactobacillus acidophilus (PROBIOTIC) 10 billion cell Cap       lisdexamfetamine (VYVANSE) 40 MG Cap Take 1 capsule (40 mg total) by mouth once daily. 90 capsule 0    magnesium oxide 500 mg Cap       progesterone (PROMETRIUM) 200 MG capsule TAKE 1 CAPSULE BY MOUTH EVERY NIGHT AT BEDTIME 90 capsule 3    traZODone (DESYREL) 50 MG tablet Take 1 tablet (50 mg total) by mouth every evening. 90 tablet 3    UNABLE TO FIND Apply 0.5 g topically every evening. Estradiol 2mg/g: Testosterone 1%(50mg/5g)  (1mg estradiol/ 5mg Test) nightly(1/2 gram nightly or two clicks nightly) 30 g 5     No current facility-administered medications for this visit.     Facility-Administered Medications Ordered in Other Visits   Medication Dose Route Frequency Provider Last Rate Last Admin    0.9%  NaCl infusion   Intravenous Continuous Jacek Silveira MD   New Bag at 07/30/20 0828    0.9%  NaCl infusion   Intravenous Continuous Jacek Silveira MD            Family History:  Family History   Problem Relation Name Age of Onset    Hypertension Father      Stroke Mother  76    Breast cancer Sister  48    Heart attack Neg Hx      Heart disease Neg Hx      Colon cancer Neg Hx      Ovarian cancer Neg Hx      Diabetes Neg Hx      Cancer Neg Hx      Uterine cancer Neg Hx      Cervical cancer Neg Hx         Social History:  Social History     Tobacco Use    Smoking status: Never    Smokeless tobacco: Never   Substance Use Topics    Alcohol use: Yes     Alcohol/week: 6.0 standard drinks of alcohol     Types: 3 Glasses of wine, 3 Cans of beer per week     Comment: socially    Drug use: No       Review of Systems:  Pertinent positives and negatives listed in HPI. All other systems are reviewed and are negative.    Health Maintaince :   Health Maintenance Topics with due status: Not Due       Topic Last Completion Date    Colorectal Cancer  Screening 07/30/2020    TETANUS VACCINE 08/06/2021    Cervical Cancer Screening 04/26/2023    Hemoglobin A1c (Diabetic Prevention Screening) 04/24/2024    Lipid Panel 04/24/2024    DEXA Scan 05/09/2024    RSV Vaccine (Age 60+ and Pregnant patients) Not Due           Eye:   Dental:     Immunizations:     The 10-year ASCVD risk score (Colt LUONG, et al., 2019) is: 2.9%    Values used to calculate the score:      Age: 59 years      Sex: Female      Is Non- : No      Diabetic: No      Tobacco smoker: No      Systolic Blood Pressure: 132 mmHg      Is BP treated: No      HDL Cholesterol: 51 mg/dL      Total Cholesterol: 167 mg/dL      Objective:   LMP 08/18/2010 (Approximate)      There is no height or weight on file to calculate BMI.       Physical Examination:  General: Alert and awake in no apparent distress  Neuro:  AAOx3; cooperative and pleasant with no focal deficits    Laboratory:      Most Recent Data:  Lab Results   Component Value Date    WBC 6.11 04/24/2024    HGB 14.1 04/24/2024    HCT 42.0 04/24/2024     04/24/2024    CHOL 167 04/24/2024    TRIG 96 04/24/2024    HDL 51 04/24/2024    ALT 24 04/24/2024    AST 19 04/24/2024     04/24/2024    K 4.8 04/24/2024     04/24/2024    BUN 13 04/24/2024    CO2 28 04/24/2024    TSH 1.791 10/12/2022    HGBA1C 5.3 04/24/2024              CBC:   WBC   Date Value Ref Range Status   04/24/2024 6.11 3.90 - 12.70 K/uL Final     Hemoglobin   Date Value Ref Range Status   04/24/2024 14.1 12.0 - 16.0 g/dL Final     Hematocrit   Date Value Ref Range Status   04/24/2024 42.0 37.0 - 48.5 % Final     Platelets   Date Value Ref Range Status   04/24/2024 258 150 - 450 K/uL Final     MCV   Date Value Ref Range Status   04/24/2024 92 82 - 98 fL Final     RDW   Date Value Ref Range Status   04/24/2024 12.1 11.5 - 14.5 % Final     BMP:   Sodium   Date Value Ref Range Status   04/24/2024 140 136 - 145 mmol/L Final     Potassium   Date Value Ref Range  "Status   04/24/2024 4.8 3.5 - 5.1 mmol/L Final     Chloride   Date Value Ref Range Status   04/24/2024 104 95 - 110 mmol/L Final     CO2   Date Value Ref Range Status   04/24/2024 28 23 - 29 mmol/L Final     BUN   Date Value Ref Range Status   04/24/2024 13 6 - 20 mg/dL Final     Creatinine   Date Value Ref Range Status   04/24/2024 0.8 0.5 - 1.4 mg/dL Final     Glucose   Date Value Ref Range Status   04/24/2024 90 70 - 110 mg/dL Final     Calcium   Date Value Ref Range Status   04/24/2024 9.6 8.7 - 10.5 mg/dL Final     Magnesium   Date Value Ref Range Status   10/12/2022 2.2 1.6 - 2.6 mg/dL Final     LFTs:   Total Protein   Date Value Ref Range Status   04/24/2024 6.9 6.0 - 8.4 g/dL Final     Albumin   Date Value Ref Range Status   04/24/2024 4.2 3.5 - 5.2 g/dL Final     Total Bilirubin   Date Value Ref Range Status   04/24/2024 0.8 0.1 - 1.0 mg/dL Final     Comment:     For infants and newborns, interpretation of results should be based  on gestational age, weight and in agreement with clinical  observations.    Premature Infant recommended reference ranges:  Up to 24 hours.............<8.0 mg/dL  Up to 48 hours............<12.0 mg/dL  3-5 days..................<15.0 mg/dL  6-29 days.................<15.0 mg/dL       AST   Date Value Ref Range Status   04/24/2024 19 10 - 40 U/L Final     Alkaline Phosphatase   Date Value Ref Range Status   04/24/2024 61 55 - 135 U/L Final     ALT   Date Value Ref Range Status   04/24/2024 24 10 - 44 U/L Final     Coags: No results found for: "INR", "PROTIME", "PTT"  FLP:      Lab Results   Component Value Date    CHOL 167 04/24/2024    CHOL 191 10/12/2022     Lab Results   Component Value Date    HDL 51 04/24/2024    HDL 54 10/12/2022     Lab Results   Component Value Date    LDLCALC 96.8 04/24/2024    LDLCALC 115.8 10/12/2022     Lab Results   Component Value Date    TRIG 96 04/24/2024    TRIG 106 10/12/2022     Lab Results   Component Value Date    CHOLHDL 30.5 04/24/2024    " "CHOLHDL 28.3 10/12/2022      DM:      Hemoglobin A1C   Date Value Ref Range Status   04/24/2024 5.3 4.0 - 5.6 % Final     Comment:     ADA Screening Guidelines:  5.7-6.4%  Consistent with prediabetes  >or=6.5%  Consistent with diabetes    High levels of fetal hemoglobin interfere with the HbA1C  assay. Heterozygous hemoglobin variants (HbS, HgC, etc)do  not significantly interfere with this assay.   However, presence of multiple variants may affect accuracy.     10/12/2022 5.2 4.0 - 5.6 % Final     Comment:     ADA Screening Guidelines:  5.7-6.4%  Consistent with prediabetes  >or=6.5%  Consistent with diabetes    High levels of fetal hemoglobin interfere with the HbA1C  assay. Heterozygous hemoglobin variants (HbS, HgC, etc)do  not significantly interfere with this assay.   However, presence of multiple variants may affect accuracy.       LDL Cholesterol   Date Value Ref Range Status   04/24/2024 96.8 63.0 - 159.0 mg/dL Final     Comment:     The National Cholesterol Education Program (NCEP) has set the  following guidelines (reference values) for LDL Cholesterol:  Optimal.......................<130 mg/dL  Borderline High...............130-159 mg/dL  High..........................160-189 mg/dL  Very High.....................>190 mg/dL       Creatinine   Date Value Ref Range Status   04/24/2024 0.8 0.5 - 1.4 mg/dL Final     Thyroid:   TSH   Date Value Ref Range Status   10/12/2022 1.791 0.400 - 4.000 uIU/mL Final     Anemia: No results found for: "IRON", "TIBC", "FERRITIN", "SGGLPUOC06", "FOLATE"  Cardiac: No results found for: "TROPONINI", "CKTOTAL", "CKMB", "BNP"  Urinalysis:   Urine Culture, Routine   Date Value Ref Range Status   09/25/2024 ENTEROCOCCUS FAECALIS  10,000 - 49,999 cfu/ml   (A)  Final   09/25/2024 (A)  Final    COAGULASE-NEGATIVE STAPHYLOCOCCUS SPECIES  10,000 - 49,999 cfu/ml  Susceptibility testing not routinely performed.       Color, UA   Date Value Ref Range Status   04/24/2024 Yellow Yellow, " Straw, Cecilia Final     Specific Gravity, UA   Date Value Ref Range Status   04/24/2024 1.020 1.005 - 1.030 Final     Nitrite, UA   Date Value Ref Range Status   04/24/2024 Negative Negative Final     Ketones, UA   Date Value Ref Range Status   04/24/2024 Negative Negative Final     WBC, UA   Date Value Ref Range Status   04/24/2024 1 0 - 5 /hpf Final       Other Results:  EKG (my interpretation):     Radiology:  DXA Bone Density Axial Skeleton 1 or more sites  Narrative: EXAMINATION:  DXA BONE DENSITY AXIAL SKELETON 1 OR MORE SITES    CLINICAL HISTORY:  Age-related osteoporosis without current pathological fracture.  58 y/o female with no history of fractures.  She had a hysterectomy at 43 y/o.  She is taking Vit D, Multivitamins and HRT.  She has a history of Renal Stones.  She does not exercise or smoke.    TECHNIQUE:  DXA specification: [facility] (S/N [id number])    Bone Mineral Density scanning was performed over the hip and lumbar spine.    Review of the images confirms satisfactory positioning and technique.    COMPARISON:  No Comparisons    FINDINGS:  Lumbar spine (L1-L4):               BMD is 0.798 g/cm2, T-score is -2.3, and Z-score is 0.9.    Total hip:                                BMD is 0.862 g/cm2, T-score is -0.7, and Z-score is 0.2.    Femoral neck:                          BMD is 0.640 g/cm2, T-score is -1.9, and Z-score is -0.6.    Distal 1/3 radius:                      BMD is 0.582 g/cm2, T-score is -1.8, and Z-score is -0.5.    FRAX:    8.8% risk of a major osteoporotic fracture in the next 10 years.    0.9% risk of hip fracture in the next 10 years.  Impression: *Low bone mass (Osteopenia); FRAX calculations do not support treatment as osteoporosis.  Wide variation in BMD between L3 and L4 vertebral bodies.    RECOMMENDATIONS:  *Daily calcium intake 4835-9833 mg, dietary sources preferred; Vitamin D 9780-5185 IU daily.  *Weight bearing exercise and fall precautions.  *If  dedicated imaging is negative for vertebral fracture, then no additional treatment is recommended at this time. If positive for fracture, would treat as osteoporosis.  *Repeat BMD in 2 years.    EXPLANATION OF RESULTS:  T-score compares these results to the average bone density of a 20-29 year-old of the same gender.    Z-score compares this result to the average bone density to people of the same age, gender, and race.    The amounts indicate the number of standard deviations above or below the mean.    * Osteoporosis is generally defined as having a T-score between less than or equal to -2.5.    * Low bone mass (osteopenia) is generally defined as having a T-score between -1.0 and -2.5.    * The normal range is generally defined as having a T-score greater than or equal to -1.0.    * Calculated FRAX scores for fracture risk prediction may not be accurate in the setting of certain clinical factors such as pharmacologic therapy for osteoporosis, prior fragility fractures, high dose glucocorticoid use.    Electronically signed by: Denisse Mariscal MD  Date:    05/17/2024  Time:    11:26          Assessment/Plan     Chaya Tam is a 59 y.o. female with:  {There are no diagnoses linked to this encounter. (Refresh or delete this SmartLink)}             I spent with this pt that includes face to face time and non-face to face time preparing to see the patient (eg, review of tests), obtaining and/or reviewing separately obtained history, documenting clinical information in the electronic or other health record, independently interpreting results and communicating results to the patient/family/caregiver, or care coordinator.   Code Status:     Celeste Lopez MD

## 2025-02-25 NOTE — PROGRESS NOTES
[facility] Progress Note      Chief Complaint     Medication Refill   ADD f/u     Subjective:      History of Present Illness:  Chaya Tam is a 59 y.o. female     ADD: refill Vyvanse 40mg daily as needed; minimizes on weekends with no side effects; refill today     Depression: well-controlled on Pristiq 50mg daily     Osteoporosis LS T-2.5 (-2.8 in 2018): had taken fosamax x 1 year and then stopped in Summer 2020. Taking Vit D 2000 units daily but no longer on Caltrate.  Vit D level 81 in 2021    Vit D def'y: now taking 2 caps bid (vit D with Calcium)      Insomnia: stable on  trazodone 50mg po qhs      HRT: uses compounded estradiol gel qhs and prometrium 200mg qhs as per Dr. Parada     Back spasms:  uses robaxin prn     Palpitations/Constipation: resolved with Mag 500mg qhs with complete resolution     PMH: dad HTN  at 89 y/o from viral encephalitis; Mom had CVA amyloidosis dx'ed at 77 y/o  in 80s.  Younger sister with HTN on 3 BP meds; older brother is preDM.      SH: no tobacco; wine/beer/bourbon - decreased from daily to weekend approx 1 month ago. No drugs;  but lives with significant other and is very happy; all of her adult 3 kids are doing well; works from home for [employer] - works 4 days per week; at office only on       Menopause: 44 years then went on hormones approx 6 years on HRT on compounded estrogen/progesterone formulation to minimize BTB by Dr. Parada     Past Medical History:  Past Medical History:   Diagnosis Date    Abnormal Pap smear of cervix     Hypertension     Kidney stones     Secondary hyperparathyroidism 2022: Ca 10.3, PTH 15, Vit D 81         Past Surgical History:  Past Surgical History:   Procedure Laterality Date    ARTHROSCOPY OF KNEE Left 2019    Procedure: ARTHROSCOPY, KNEE;  Surgeon: Jacek Silveira MD;  Location: [facility];  Service: Orthopedics;  Laterality: Left;    COLONOSCOPY N/A 2020     Procedure: COLONOSCOPY;  Surgeon: Eddie Leigh MD;  Location: [facility] (4TH FLR);  Service: Endoscopy;  Laterality: N/A;  covid-7/27/20-[facility] [place]-2nd floor-[facility]  instructions emailed to patient-BB    COLPOSCOPY      CRYOABLATION  04/14/1999    DILATION AND CURETTAGE OF UTERUS      EXCISION OF MEDIAL MENISCUS OF KNEE Left 01/18/2019    Procedure: MENISCECTOMY, KNEE, MEDIAL;  Surgeon: Jacek Silveira MD;  Location: [facility];  Service: Orthopedics;  Laterality: Left;    kidney stones         Allergies:  Review of patient's allergies indicates:   Allergen Reactions    Bactrim [sulfamethoxazole-trimethoprim] Rash       Home Medications:  Current Outpatient Medications   Medication Sig Dispense Refill    AA-prot-lysine-methio-vit C-B6 (A/G PRO) 50-12.5-16.7 mg Tab       desvenlafaxine succinate (PRISTIQ) 50 MG Tb24 Take 1 tablet (50 mg total) by mouth once daily. 90 tablet 2    fluticasone propionate (FLONASE) 50 mcg/actuation nasal spray 1 spray by Each Nostril route 2 (two) times daily.      ozxbv-nb-0-dha-epa-phospho-ast 1,000-230-60 mg Cap       Lactobacillus acidophilus (PROBIOTIC) 10 billion cell Cap       magnesium oxide 500 mg Cap       mupirocin (BACTROBAN) 2 % ointment Apply topically.      progesterone (PROMETRIUM) 200 MG capsule TAKE 1 CAPSULE BY MOUTH EVERY NIGHT AT BEDTIME 90 capsule 3    traZODone (DESYREL) 50 MG tablet Take 1 tablet (50 mg total) by mouth every evening. 90 tablet 3    triamcinolone acetonide 0.1% (KENALOG) 0.1 % paste SMARTSIG:TO TEETH      UNABLE TO FIND Apply 0.5 g topically every evening. Estradiol 2mg/g: Testosterone 1%(50mg/5g)  (1mg estradiol/ 5mg Test) nightly(1/2 gram nightly or two clicks nightly) 30 g 5    lisdexamfetamine (VYVANSE) 40 MG Cap Take 1 capsule (40 mg total) by mouth once daily. 90 capsule 0    methocarbamoL (ROBAXIN) 500 MG Tab Take 1 tablet (500 mg total) by mouth 2 (two) times daily as needed (muscle stiffiness). 40 tablet 1     No current facility-administered  medications for this visit.     Facility-Administered Medications Ordered in Other Visits   Medication Dose Route Frequency Provider Last Rate Last Admin    0.9%  NaCl infusion   Intravenous Continuous Jacek Silveira MD   New Bag at 07/30/20 0828    0.9%  NaCl infusion   Intravenous Continuous Jacek Silveira MD            Family History:  Family History   Problem Relation Name Age of Onset    Hypertension Father      Stroke Mother  76    Breast cancer Sister  48    Heart attack Neg Hx      Heart disease Neg Hx      Colon cancer Neg Hx      Ovarian cancer Neg Hx      Diabetes Neg Hx      Cancer Neg Hx      Uterine cancer Neg Hx      Cervical cancer Neg Hx         Social History:  Social History     Tobacco Use    Smoking status: Never    Smokeless tobacco: Never   Substance Use Topics    Alcohol use: Yes     Alcohol/week: 6.0 standard drinks of alcohol     Types: 3 Glasses of wine, 3 Cans of beer per week     Comment: socially    Drug use: No       Review of Systems:  Pertinent positives and negatives listed in HPI. All other systems are reviewed and are negative.    Health Maintaince :   Health Maintenance Topics with due status: Not Due       Topic Last Completion Date    Colorectal Cancer Screening 07/30/2020    TETANUS VACCINE 08/06/2021    Cervical Cancer Screening 04/26/2023    Hemoglobin A1c (Diabetic Prevention Screening) 04/24/2024    Lipid Panel 04/24/2024    DEXA Scan 05/09/2024    RSV Vaccine (Age 60+ and Pregnant patients) Not Due           Eye: needs  Dental: UTD    Immunizations:   Tdap: n/a.  Influenza: needs.  Pneumovax: n/a  Shingrex x 2: needs  COVID: needs   Hepatitis C:   Cancer Screening:  PAP: UTD Dr. Parada 9/2024   Mammogram: UTD 4/2024  Colonoscopy: needs 7/2025 due to 1mm sessile transverse colon   DEXA: 5/2024 Lumbar spine (L1-L4):               BMD is 0.798 g/cm2, T-score is -2.3, and Z-score is 0.9.     Total hip:                                BMD is 0.862 g/cm2, T-score is  "-0.7, and Z-score is 0.2.     Femoral neck:                          BMD is 0.640 g/cm2, T-score is -1.9, and Z-score is -0.6.     Distal 1/3 radius:                      BMD is 0.582 g/cm2, T-score is -1.8, and Z-score is -0.5.     FRAX:     8.8% risk of a major osteoporotic fracture in the next 10 years.     0.9% risk of hip fracture in the next 10 years.   needs repeat in July 2024  LDCT: n/a    The 10-year ASCVD risk score (Colt LUONG, et al., 2019) is: 2.5%    Values used to calculate the score:      Age: 59 years      Sex: Female      Is Non- : No      Diabetic: No      Tobacco smoker: No      Systolic Blood Pressure: 122 mmHg      Is BP treated: No      HDL Cholesterol: 51 mg/dL      Total Cholesterol: 167 mg/dL      Objective:   /80 (BP Location: Left arm, Patient Position: Sitting)   Pulse 78   Ht 5' 6" (1.676 m)   Wt 73.1 kg (161 lb 2.5 oz)   LMP 08/18/2010 (Approximate)   SpO2 99%   BMI 26.01 kg/m²      Body mass index is 26.01 kg/m².       Physical Examination:  General: Alert and awake in no apparent distress  Neck:   Cervical nodes not enlarged; supple; no bruits  Cardio:  Regular rate and rhythm with normal S1 and S2; no murmurs or rubs  Resp:  CTAB; respirations unlabored; no wheezes, crackles or rhonchi  Abdom: Soft, NTND with normoactive bowel sounds; negative HSM  Extrem: Warm and well-perfused with no clubbing, cyanosis or edema  Neuro:  AAOx3; cooperative and pleasant with no focal deficits    Laboratory:      Most Recent Data:  Lab Results   Component Value Date    WBC 6.11 04/24/2024    HGB 14.1 04/24/2024    HCT 42.0 04/24/2024     04/24/2024    CHOL 167 04/24/2024    TRIG 96 04/24/2024    HDL 51 04/24/2024    ALT 24 04/24/2024    AST 19 04/24/2024     04/24/2024    K 4.8 04/24/2024     04/24/2024    BUN 13 04/24/2024    CO2 28 04/24/2024    TSH 1.791 10/12/2022    HGBA1C 5.3 04/24/2024              CBC:   WBC   Date Value Ref Range Status "   04/24/2024 6.11 3.90 - 12.70 K/uL Final     Hemoglobin   Date Value Ref Range Status   04/24/2024 14.1 12.0 - 16.0 g/dL Final     Hematocrit   Date Value Ref Range Status   04/24/2024 42.0 37.0 - 48.5 % Final     Platelets   Date Value Ref Range Status   04/24/2024 258 150 - 450 K/uL Final     MCV   Date Value Ref Range Status   04/24/2024 92 82 - 98 fL Final     RDW   Date Value Ref Range Status   04/24/2024 12.1 11.5 - 14.5 % Final     BMP:   Sodium   Date Value Ref Range Status   04/24/2024 140 136 - 145 mmol/L Final     Potassium   Date Value Ref Range Status   04/24/2024 4.8 3.5 - 5.1 mmol/L Final     Chloride   Date Value Ref Range Status   04/24/2024 104 95 - 110 mmol/L Final     CO2   Date Value Ref Range Status   04/24/2024 28 23 - 29 mmol/L Final     BUN   Date Value Ref Range Status   04/24/2024 13 6 - 20 mg/dL Final     Creatinine   Date Value Ref Range Status   04/24/2024 0.8 0.5 - 1.4 mg/dL Final     Glucose   Date Value Ref Range Status   04/24/2024 90 70 - 110 mg/dL Final     Calcium   Date Value Ref Range Status   04/24/2024 9.6 8.7 - 10.5 mg/dL Final     Magnesium   Date Value Ref Range Status   10/12/2022 2.2 1.6 - 2.6 mg/dL Final     LFTs:   Total Protein   Date Value Ref Range Status   04/24/2024 6.9 6.0 - 8.4 g/dL Final     Albumin   Date Value Ref Range Status   04/24/2024 4.2 3.5 - 5.2 g/dL Final     Total Bilirubin   Date Value Ref Range Status   04/24/2024 0.8 0.1 - 1.0 mg/dL Final     Comment:     For infants and newborns, interpretation of results should be based  on gestational age, weight and in agreement with clinical  observations.    Premature Infant recommended reference ranges:  Up to 24 hours.............<8.0 mg/dL  Up to 48 hours............<12.0 mg/dL  3-5 days..................<15.0 mg/dL  6-29 days.................<15.0 mg/dL       AST   Date Value Ref Range Status   04/24/2024 19 10 - 40 U/L Final     Alkaline Phosphatase   Date Value Ref Range Status   04/24/2024 61 55 -  "135 U/L Final     ALT   Date Value Ref Range Status   04/24/2024 24 10 - 44 U/L Final     Coags: No results found for: "INR", "PROTIME", "PTT"  FLP:      Lab Results   Component Value Date    CHOL 167 04/24/2024    CHOL 191 10/12/2022     Lab Results   Component Value Date    HDL 51 04/24/2024    HDL 54 10/12/2022     Lab Results   Component Value Date    LDLCALC 96.8 04/24/2024    LDLCALC 115.8 10/12/2022     Lab Results   Component Value Date    TRIG 96 04/24/2024    TRIG 106 10/12/2022     Lab Results   Component Value Date    CHOLHDL 30.5 04/24/2024    CHOLHDL 28.3 10/12/2022      DM:      Hemoglobin A1C   Date Value Ref Range Status   04/24/2024 5.3 4.0 - 5.6 % Final     Comment:     ADA Screening Guidelines:  5.7-6.4%  Consistent with prediabetes  >or=6.5%  Consistent with diabetes    High levels of fetal hemoglobin interfere with the HbA1C  assay. Heterozygous hemoglobin variants (HbS, HgC, etc)do  not significantly interfere with this assay.   However, presence of multiple variants may affect accuracy.     10/12/2022 5.2 4.0 - 5.6 % Final     Comment:     ADA Screening Guidelines:  5.7-6.4%  Consistent with prediabetes  >or=6.5%  Consistent with diabetes    High levels of fetal hemoglobin interfere with the HbA1C  assay. Heterozygous hemoglobin variants (HbS, HgC, etc)do  not significantly interfere with this assay.   However, presence of multiple variants may affect accuracy.       LDL Cholesterol   Date Value Ref Range Status   04/24/2024 96.8 63.0 - 159.0 mg/dL Final     Comment:     The National Cholesterol Education Program (NCEP) has set the  following guidelines (reference values) for LDL Cholesterol:  Optimal.......................<130 mg/dL  Borderline High...............130-159 mg/dL  High..........................160-189 mg/dL  Very High.....................>190 mg/dL       Creatinine   Date Value Ref Range Status   04/24/2024 0.8 0.5 - 1.4 mg/dL Final     Thyroid:   TSH   Date Value Ref Range " "Status   10/12/2022 1.791 0.400 - 4.000 uIU/mL Final     Anemia: No results found for: "IRON", "TIBC", "FERRITIN", "[id number]", "FOLATE"  Cardiac: No results found for: "TROPONINI", "CKTOTAL", "CKMB", "BNP"  Urinalysis:   Urine Culture, Routine   Date Value Ref Range Status   09/25/2024 ENTEROCOCCUS FAECALIS  10,000 - 49,999 cfu/ml   (A)  Final   09/25/2024 (A)  Final    COAGULASE-NEGATIVE STAPHYLOCOCCUS SPECIES  10,000 - 49,999 cfu/ml  Susceptibility testing not routinely performed.       Color, UA   Date Value Ref Range Status   04/24/2024 Yellow Yellow, Straw, Cecilia Final     Specific Gravity, UA   Date Value Ref Range Status   04/24/2024 1.020 1.005 - 1.030 Final     Nitrite, UA   Date Value Ref Range Status   04/24/2024 Negative Negative Final     Ketones, UA   Date Value Ref Range Status   04/24/2024 Negative Negative Final     WBC, UA   Date Value Ref Range Status   04/24/2024 1 0 - 5 /hpf Final       Other Results:  EKG (my interpretation):     Radiology:  DXA Bone Density Axial Skeleton 1 or more sites  Narrative: EXAMINATION:  DXA BONE DENSITY AXIAL SKELETON 1 OR MORE SITES    CLINICAL HISTORY:  Age-related osteoporosis without current pathological fracture.  60 y/o female with no history of fractures.  She had a hysterectomy at 45 y/o.  She is taking Vit D, Multivitamins and HRT.  She has a history of Renal Stones.  She does not exercise or smoke.    TECHNIQUE:  DXA specification: [facility] (S/N [id number])    Bone Mineral Density scanning was performed over the hip and lumbar spine.    Review of the images confirms satisfactory positioning and technique.    COMPARISON:  No Comparisons    FINDINGS:  Lumbar spine (L1-L4):               BMD is 0.798 g/cm2, T-score is -2.3, and Z-score is 0.9.    Total hip:                                BMD is 0.862 g/cm2, T-score is -0.7, and Z-score is 0.2.    Femoral neck:                          BMD is 0.640 g/cm2, T-score is -1.9, and Z-score is " -0.6.    Distal 1/3 radius:                      BMD is 0.582 g/cm2, T-score is -1.8, and Z-score is -0.5.    FRAX:    8.8% risk of a major osteoporotic fracture in the next 10 years.    0.9% risk of hip fracture in the next 10 years.  Impression: *Low bone mass (Osteopenia); FRAX calculations do not support treatment as osteoporosis.  Wide variation in BMD between L3 and L4 vertebral bodies.    RECOMMENDATIONS:  *Daily calcium intake 4345-5963 mg, dietary sources preferred; Vitamin D 3631-9723 IU daily.  *Weight bearing exercise and fall precautions.  *If dedicated imaging is negative for vertebral fracture, then no additional treatment is recommended at this time. If positive for fracture, would treat as osteoporosis.  *Repeat BMD in 2 years.    EXPLANATION OF RESULTS:  T-score compares these results to the average bone density of a 20-29 year-old of the same gender.    Z-score compares this result to the average bone density to people of the same age, gender, and race.    The amounts indicate the number of standard deviations above or below the mean.    * Osteoporosis is generally defined as having a T-score between less than or equal to -2.5.    * Low bone mass (osteopenia) is generally defined as having a T-score between -1.0 and -2.5.    * The normal range is generally defined as having a T-score greater than or equal to -1.0.    * Calculated FRAX scores for fracture risk prediction may not be accurate in the setting of certain clinical factors such as pharmacologic therapy for osteoporosis, prior fragility fractures, high dose glucocorticoid use.    Electronically signed by: Denisse Mariscal MD  Date:    05/17/2024  Time:    11:26          Assessment/Plan     Chaya Tam is a 59 y.o. female with:  1. Attention deficit disorder (ADD) without hyperactivity  Overview:  Dx'ed as an adult     Assessment & Plan:  Refill  Vyvanse 40mg daily prn   Monitor for side effects ie abdominal pain, N/V, headaches,  insomnia, weight loss, appetite suppression   Take frequent holidays    Schedule next appt in-person in 3 mo    Orders:  -     lisdexamfetamine (VYVANSE) 40 MG Cap; Take 1 capsule (40 mg total) by mouth once daily.  Dispense: 90 capsule; Refill: 0    2. Adjustment insomnia  Assessment & Plan:  Stable Trazodone 50mg po qhs       3. Hot flashes due to menopause  Assessment & Plan:   Cont compounded estradiol gel qhs and prometrium 200mg qhs         4. Osteoporosis, unspecified osteoporosis type, unspecified pathological fracture presence  Overview:  DEXA:  7/2022 with LS -2.5 (-2.8 in 2018); left femoral neck T-1.6; right femoral neck T-2.0      Assessment & Plan:    Cont to hold fosamax 70mg weekly   Check Vit D level in 4/2025   Repeat DEXA July 2026 and determine need for restarting fosamax       5. Preventative health care  Assessment & Plan:  Flu vaccine today   Refill Vyvanse  Annual visit in 4/2025 with labs prior       6. Back spasm  Assessment & Plan:  Refill Robaxin 500mg daily prn       7. Slow transit constipation  Assessment & Plan:  Resolved with Mag oxide 500mg daily   Drink 2 liters of water daily       8. Palpitations  Assessment & Plan:  Stable on Mag oxide 500mg daily       Other orders  -     methocarbamoL (ROBAXIN) 500 MG Tab; Take 1 tablet (500 mg total) by mouth 2 (two) times daily as needed (muscle stiffiness).  Dispense: 40 tablet; Refill: 1               I spent a total of 36 minutes on the day of the visit.This includes face to face time and non-face to face time preparing to see the patient (eg, review of tests), obtaining and/or reviewing separately obtained history, documenting clinical information in the electronic or other health record, independently interpreting results and communicating results to the patient/family/caregiver, or care coordinator.   Code Status:     Celeste Lopez MD

## 2025-02-25 NOTE — ASSESSMENT & PLAN NOTE
Refill  Vyvanse 40mg daily prn   Monitor for side effects ie abdominal pain, N/V, headaches, insomnia, weight loss, appetite suppression   Take frequent holidays    Schedule next appt in-person in 3 mo

## 2025-02-25 NOTE — PATIENT INSTRUCTIONS
Attention deficit disorder (ADD) without hyperactivity  Assessment & Plan:  Refill  Vyvanse 40mg daily prn   Monitor for side effects ie abdominal pain, N/V, headaches, insomnia, weight loss, appetite suppression   Take frequent holidays    Schedule next appt in-person in 3 mo      Adjustment insomnia  Assessment & Plan:  Stable Trazodone 50mg po qhs       Hot flashes due to menopause  Assessment & Plan:   Cont compounded estradiol gel qhs and prometrium 200mg qhs         Osteoporosis, unspecified osteoporosis type, unspecified pathological fracture presence  Assessment & Plan:  Order DEXA now in July 2024 -  will start fosamax 70mg weekly if still has osteoporosis  Check Vit D level in 4/2025   Repeat DEXA July 2026 and determine need for restarting fosamax       Preventative health care  Assessment & Plan:  Flu vaccine today   Refill Vyvanse  Annual visit in 4/2025 with labs prior       Other orders  -     methocarbamoL (ROBAXIN) 500 MG Tab; Take 1 tablet (500 mg total) by mouth 2 (two) times daily as needed (muscle stiffiness).  Dispense: 40 tablet; Refill: 1

## 2025-02-25 NOTE — ASSESSMENT & PLAN NOTE
Cont to hold fosamax 70mg weekly   Check Vit D level in 4/2025   Repeat DEXA July 2026 and determine need for restarting fosamax

## 2025-03-14 DIAGNOSIS — F41.9 ANXIETY: ICD-10-CM

## 2025-03-14 RX ORDER — METHOCARBAMOL 500 MG/1
500 TABLET, FILM COATED ORAL 2 TIMES DAILY PRN
Qty: 40 TABLET | Refills: 1 | Status: SHIPPED | OUTPATIENT
Start: 2025-03-14 | End: 2025-04-23

## 2025-03-14 RX ORDER — DESVENLAFAXINE 50 MG/1
50 TABLET, FILM COATED, EXTENDED RELEASE ORAL
Qty: 90 TABLET | Refills: 0 | Status: SHIPPED | OUTPATIENT
Start: 2025-03-14

## 2025-03-14 NOTE — TELEPHONE ENCOUNTER
Care Due:                  Date            Visit Type   Department     Provider  --------------------------------------------------------------------------------                                MYCHART                              FOLLOWUP/OF  [facility]  Last Visit: 02-      [facility] VISIT   Care           Celeste Lopez  Next Visit: None Scheduled  None         None Found                                                            Last  Test          Frequency    Reason                     Performed    Due Date  --------------------------------------------------------------------------------    Cr..........  12 months..  desvenlafaxine...........  04- 04-    [facility] Embedded Care Due Messages. Reference number: [id number].   3/14/2025 11:55:45 AM CDT

## 2025-03-15 NOTE — TELEPHONE ENCOUNTER
Refill Routing Note   Medication(s) are not appropriate for processing by [facility] for the following reason(s):        Outside of protocol    [facility] action(s):  Route  Approve   Requires labs : Yes             Appointments  past 12m or future 3m with PCP    Date Provider   Last Visit   2/25/2025 Celeste Lopez MD   Next Visit   Visit date not found Celeste Lopez MD   ED visits in past 90 days: 0        Note composed:8:13 PM 03/14/2025

## 2025-03-25 ENCOUNTER — PATIENT MESSAGE (OUTPATIENT)
Dept: PRIMARY CARE CLINIC | Facility: CLINIC | Age: 60
End: 2025-03-25
Payer: COMMERCIAL

## 2025-03-25 DIAGNOSIS — R30.0 DYSURIA: Primary | ICD-10-CM

## 2025-05-01 DIAGNOSIS — Z12.31 OTHER SCREENING MAMMOGRAM: ICD-10-CM

## 2025-05-28 DIAGNOSIS — F51.02 ADJUSTMENT INSOMNIA: ICD-10-CM

## 2025-05-28 RX ORDER — TRAZODONE HYDROCHLORIDE 50 MG/1
50 TABLET ORAL NIGHTLY
Qty: 90 TABLET | Refills: 2 | Status: SHIPPED | OUTPATIENT
Start: 2025-05-28

## 2025-05-29 NOTE — TELEPHONE ENCOUNTER
Provider Staff:  Action required for this patient    Requires labs      Please see care gap opportunities below in Care Due Message.    Thanks!  [facility]     Appointments      Date Provider   Last Visit   2/25/2025 Celeste Lopez MD   Next Visit   Visit date not found Celeste Lopez MD     Refill Decision Note   Chaya Tam  is requesting a refill authorization.  Brief Assessment and Rationale for Refill:  Approve     Medication Therapy Plan:  Lab(s) recommended: CMP      Comments:     Note composed:9:53 PM 05/28/2025

## 2025-05-29 NOTE — TELEPHONE ENCOUNTER
Care Due:                  Date            Visit Type   Department     Provider  --------------------------------------------------------------------------------                                MYCHART                              FOLLOWUP/OF  [facility]  Last Visit: 02-      [facility] VISIT   CARE           Celeste Lopez  Next Visit: None Scheduled  None         None Found                                                            Last  Test          Frequency    Reason                     Performed    Due Date  --------------------------------------------------------------------------------    Cr..........  12 months..  desvenlafaxine...........  04- 04-    [facility] Embedded Care Due Messages. Reference number: [id number].   5/28/2025 9:00:31 PM CDT

## 2025-06-10 NOTE — PROGRESS NOTES
[facility] Progress Note      Audiovisual Visit  Location:  [place], [place]      Chief Complaint     No chief complaint on file.   ADD f/u     Subjective:      History of Present Illness:  Chaya Tam is a 60 y.o. female     ADD: refill Vyvanse 40mg daily as needed; minimizes on weekends with no side effects; refill today     Depression: well-controlled on Pristiq 50mg daily     Insomnia: stable on  trazodone 50mg po qhs      HRT: uses compounded estradiol gel qhs and prometrium 200mg qhs as per Dr. Parada   Menopause: 44 years then went on hormones approx 6 years on HRT on compounded estrogen/progesterone formulation to minimize BTB by Dr. Parada     Past Medical History:  Past Medical History:   Diagnosis Date    Abnormal Pap smear of cervix     Hypertension     Kidney stones     Secondary hyperparathyroidism 08/25/2022 7/2022: Ca 10.3, PTH 15, Vit D 81         Past Surgical History:  Past Surgical History:   Procedure Laterality Date    ARTHROSCOPY OF KNEE Left 01/18/2019    Procedure: ARTHROSCOPY, KNEE;  Surgeon: Jacek Silveira MD;  Location: [facility];  Service: Orthopedics;  Laterality: Left;    COLONOSCOPY N/A 07/30/2020    Procedure: COLONOSCOPY;  Surgeon: Eddie Leigh MD;  Location: [facility] (4TH FLR);  Service: Endoscopy;  Laterality: N/A;  covid-7/27/20-[facility]-2nd floor-BB  instructions emailed to patient-BB    COLPOSCOPY      CRYOABLATION  04/14/1999    DILATION AND CURETTAGE OF UTERUS      EXCISION OF MEDIAL MENISCUS OF KNEE Left 01/18/2019    Procedure: MENISCECTOMY, KNEE, MEDIAL;  Surgeon: Jacek Silveira MD;  Location: [facility];  Service: Orthopedics;  Laterality: Left;    kidney stones         Allergies:  Review of patient's allergies indicates:   Allergen Reactions    Bactrim [sulfamethoxazole-trimethoprim] Rash       Home Medications:  Current Outpatient Medications   Medication Sig Dispense Refill    AA-prot-lysine-methio-vit C-B6 (A/G PRO)  50-12.5-16.7 mg Tab       desvenlafaxine succinate (PRISTIQ) 50 MG Tb24 TAKE 1 TABLET BY MOUTH EVERY DAY 90 tablet 0    fluticasone propionate (FLONASE) 50 mcg/actuation nasal spray 1 spray by Each Nostril route 2 (two) times daily.      ebhoa-tm-6-dha-epa-phospho-ast 1,000-230-60 mg Cap       Lactobacillus acidophilus (PROBIOTIC) 10 billion cell Cap       lisdexamfetamine (VYVANSE) 40 MG Cap Take 1 capsule (40 mg total) by mouth once daily. 90 capsule 0    magnesium oxide 500 mg Cap       mupirocin (BACTROBAN) 2 % ointment Apply topically.      progesterone (PROMETRIUM) 200 MG capsule TAKE 1 CAPSULE BY MOUTH EVERY NIGHT AT BEDTIME 90 capsule 3    traZODone (DESYREL) 50 MG tablet Take 1 tablet (50 mg total) by mouth every evening. 90 tablet 2    triamcinolone acetonide 0.1% (KENALOG) 0.1 % paste SMARTSIG:TO TEETH      UNABLE TO FIND Apply 0.5 g topically every evening. Estradiol 2mg/g: Testosterone 1%(50mg/5g)  (1mg estradiol/ 5mg Test) nightly(1/2 gram nightly or two clicks nightly) 30 g 5     No current facility-administered medications for this visit.     Facility-Administered Medications Ordered in Other Visits   Medication Dose Route Frequency Provider Last Rate Last Admin    0.9%  NaCl infusion   Intravenous Continuous Jacek Silveira MD   New Bag at 07/30/20 0828    0.9%  NaCl infusion   Intravenous Continuous Jacek Silveira MD            Family History:  Family History   Problem Relation Name Age of Onset    Hypertension Father      Stroke Mother  76    Breast cancer Sister  48    Heart attack Neg Hx      Heart disease Neg Hx      Colon cancer Neg Hx      Ovarian cancer Neg Hx      Diabetes Neg Hx      Cancer Neg Hx      Uterine cancer Neg Hx      Cervical cancer Neg Hx         Social History:  Social History     Tobacco Use    Smoking status: Never    Smokeless tobacco: Never   Substance Use Topics    Alcohol use: Yes     Alcohol/week: 6.0 standard drinks of alcohol     Types: 3 Glasses of wine, 3  Cans of beer per week     Comment: socially    Drug use: No       Review of Systems:  Pertinent positives and negatives listed in HPI. All other systems are reviewed and are negative.    Health Maintaince :   Health Maintenance Topics with due status: Not Due       Topic Last Completion Date    TETANUS VACCINE 08/06/2021    Cervical Cancer Screening 04/26/2023    Hemoglobin A1c (Diabetic Prevention Screening) 04/24/2024    Lipid Panel 04/24/2024    DEXA Scan 05/09/2024           Eye: needs  Dental: UTD    Immunizations:   Tdap: n/a.  Influenza: needs.  Pneumovax: n/a  Shingrex x 2: needs  COVID: needs   Hepatitis C:   Cancer Screening:  PAP: UTD Dr. Parada 9/2024   Mammogram: UTD 4/2024  Colonoscopy: needs 7/2025 due to 1mm sessile transverse colon   DEXA: 5/2024 Lumbar spine (L1-L4):               BMD is 0.798 g/cm2, T-score is -2.3, and Z-score is 0.9.     Total hip:                                BMD is 0.862 g/cm2, T-score is -0.7, and Z-score is 0.2.     Femoral neck:                          BMD is 0.640 g/cm2, T-score is -1.9, and Z-score is -0.6.     Distal 1/3 radius:                      BMD is 0.582 g/cm2, T-score is -1.8, and Z-score is -0.5.     FRAX:     8.8% risk of a major osteoporotic fracture in the next 10 years.     0.9% risk of hip fracture in the next 10 years.   needs repeat in July 2024  LDCT: n/a    The 10-year ASCVD risk score (Colt LUONG, et al., 2019) is: 2.7%    Values used to calculate the score:      Age: 60 years      Sex: Female      Is Non- : No      Diabetic: No      Tobacco smoker: No      Systolic Blood Pressure: 122 mmHg      Is BP treated: No      HDL Cholesterol: 51 mg/dL      Total Cholesterol: 167 mg/dL      Objective:   LMP 08/18/2010 (Approximate)      There is no height or weight on file to calculate BMI.       Physical Examination:  General: Alert and awake in no apparent distress  Neck:   Cervical nodes not enlarged; supple; no  bruits  Cardio:  Regular rate and rhythm with normal S1 and S2; no murmurs or rubs  Resp:  CTAB; respirations unlabored; no wheezes, crackles or rhonchi  Abdom: Soft, NTND with normoactive bowel sounds; negative HSM  Extrem: Warm and well-perfused with no clubbing, cyanosis or edema  Neuro:  AAOx3; cooperative and pleasant with no focal deficits    Laboratory:      Most Recent Data:  Lab Results   Component Value Date    WBC 6.11 04/24/2024    HGB 14.1 04/24/2024    HCT 42.0 04/24/2024     04/24/2024    CHOL 167 04/24/2024    TRIG 96 04/24/2024    HDL 51 04/24/2024    ALT 24 04/24/2024    AST 19 04/24/2024     04/24/2024    K 4.8 04/24/2024     04/24/2024    BUN 13 04/24/2024    CO2 28 04/24/2024    TSH 1.791 10/12/2022    HGBA1C 5.3 04/24/2024              CBC:   WBC   Date Value Ref Range Status   04/24/2024 6.11 3.90 - 12.70 K/uL Final     Hemoglobin   Date Value Ref Range Status   04/24/2024 14.1 12.0 - 16.0 g/dL Final     Hematocrit   Date Value Ref Range Status   04/24/2024 42.0 37.0 - 48.5 % Final     Platelets   Date Value Ref Range Status   04/24/2024 258 150 - 450 K/uL Final     MCV   Date Value Ref Range Status   04/24/2024 92 82 - 98 fL Final     RDW   Date Value Ref Range Status   04/24/2024 12.1 11.5 - 14.5 % Final     BMP:   Sodium   Date Value Ref Range Status   04/24/2024 140 136 - 145 mmol/L Final     Potassium   Date Value Ref Range Status   04/24/2024 4.8 3.5 - 5.1 mmol/L Final     Chloride   Date Value Ref Range Status   04/24/2024 104 95 - 110 mmol/L Final     CO2   Date Value Ref Range Status   04/24/2024 28 23 - 29 mmol/L Final     BUN   Date Value Ref Range Status   04/24/2024 13 6 - 20 mg/dL Final     Creatinine   Date Value Ref Range Status   04/24/2024 0.8 0.5 - 1.4 mg/dL Final     Glucose   Date Value Ref Range Status   04/24/2024 90 70 - 110 mg/dL Final     Calcium   Date Value Ref Range Status   04/24/2024 9.6 8.7 - 10.5 mg/dL Final     Magnesium   Date Value Ref  "Range Status   10/12/2022 2.2 1.6 - 2.6 mg/dL Final     LFTs:   Total Protein   Date Value Ref Range Status   04/24/2024 6.9 6.0 - 8.4 g/dL Final     Albumin   Date Value Ref Range Status   04/24/2024 4.2 3.5 - 5.2 g/dL Final     Total Bilirubin   Date Value Ref Range Status   04/24/2024 0.8 0.1 - 1.0 mg/dL Final     Comment:     For infants and newborns, interpretation of results should be based  on gestational age, weight and in agreement with clinical  observations.    Premature Infant recommended reference ranges:  Up to 24 hours.............<8.0 mg/dL  Up to 48 hours............<12.0 mg/dL  3-5 days..................<15.0 mg/dL  6-29 days.................<15.0 mg/dL       AST   Date Value Ref Range Status   04/24/2024 19 10 - 40 U/L Final     Alkaline Phosphatase   Date Value Ref Range Status   04/24/2024 61 55 - 135 U/L Final     ALT   Date Value Ref Range Status   04/24/2024 24 10 - 44 U/L Final     Coags: No results found for: "INR", "PROTIME", "PTT"  FLP:      Lab Results   Component Value Date    CHOL 167 04/24/2024    CHOL 191 10/12/2022     Lab Results   Component Value Date    HDL 51 04/24/2024    HDL 54 10/12/2022     Lab Results   Component Value Date    LDLCALC 96.8 04/24/2024    LDLCALC 115.8 10/12/2022     Lab Results   Component Value Date    TRIG 96 04/24/2024    TRIG 106 10/12/2022     Lab Results   Component Value Date    CHOLHDL 30.5 04/24/2024    CHOLHDL 28.3 10/12/2022      DM:      Hemoglobin A1C   Date Value Ref Range Status   04/24/2024 5.3 4.0 - 5.6 % Final     Comment:     ADA Screening Guidelines:  5.7-6.4%  Consistent with prediabetes  >or=6.5%  Consistent with diabetes    High levels of fetal hemoglobin interfere with the HbA1C  assay. Heterozygous hemoglobin variants (HbS, HgC, etc)do  not significantly interfere with this assay.   However, presence of multiple variants may affect accuracy.     10/12/2022 5.2 4.0 - 5.6 % Final     Comment:     ADA Screening Guidelines:  5.7-6.4%  " "Consistent with prediabetes  >or=6.5%  Consistent with diabetes    High levels of fetal hemoglobin interfere with the HbA1C  assay. Heterozygous hemoglobin variants (HbS, HgC, etc)do  not significantly interfere with this assay.   However, presence of multiple variants may affect accuracy.       LDL Cholesterol   Date Value Ref Range Status   04/24/2024 96.8 63.0 - 159.0 mg/dL Final     Comment:     The National Cholesterol Education Program (NCEP) has set the  following guidelines (reference values) for LDL Cholesterol:  Optimal.......................<130 mg/dL  Borderline High...............130-159 mg/dL  High..........................160-189 mg/dL  Very High.....................>190 mg/dL       Creatinine   Date Value Ref Range Status   04/24/2024 0.8 0.5 - 1.4 mg/dL Final     Thyroid:   TSH   Date Value Ref Range Status   10/12/2022 1.791 0.400 - 4.000 uIU/mL Final     Anemia: No results found for: "IRON", "TIBC", "FERRITIN", "[id number]", "FOLATE"  Cardiac: No results found for: "TROPONINI", "CKTOTAL", "CKMB", "BNP"  Urinalysis:   Urine Culture, Routine   Date Value Ref Range Status   09/25/2024 ENTEROCOCCUS FAECALIS  10,000 - 49,999 cfu/ml   (A)  Final   09/25/2024 (A)  Final    COAGULASE-NEGATIVE STAPHYLOCOCCUS SPECIES  10,000 - 49,999 cfu/ml  Susceptibility testing not routinely performed.       Color, UA   Date Value Ref Range Status   04/24/2024 Yellow Yellow, Straw, Cecilia Final     Specific Gravity, UA   Date Value Ref Range Status   04/24/2024 1.020 1.005 - 1.030 Final     Nitrite, UA   Date Value Ref Range Status   04/24/2024 Negative Negative Final     Ketones, UA   Date Value Ref Range Status   04/24/2024 Negative Negative Final     WBC, UA   Date Value Ref Range Status   04/24/2024 1 0 - 5 /hpf Final       Other Results:  EKG (my interpretation):     Radiology:  DXA Bone Density Axial Skeleton 1 or more sites  Narrative: EXAMINATION:  DXA BONE DENSITY AXIAL SKELETON 1 OR MORE SITES    CLINICAL " HISTORY:  Age-related osteoporosis without current pathological fracture.  60 y/o female with no history of fractures.  She had a hysterectomy at 43 y/o.  She is taking Vit D, Multivitamins and HRT.  She has a history of Renal Stones.  She does not exercise or smoke.    TECHNIQUE:  DXA specification: [facility] (S/N [id number])    Bone Mineral Density scanning was performed over the hip and lumbar spine.    Review of the images confirms satisfactory positioning and technique.    COMPARISON:  No Comparisons    FINDINGS:  Lumbar spine (L1-L4):               BMD is 0.798 g/cm2, T-score is -2.3, and Z-score is 0.9.    Total hip:                                BMD is 0.862 g/cm2, T-score is -0.7, and Z-score is 0.2.    Femoral neck:                          BMD is 0.640 g/cm2, T-score is -1.9, and Z-score is -0.6.    Distal 1/3 radius:                      BMD is 0.582 g/cm2, T-score is -1.8, and Z-score is -0.5.    FRAX:    8.8% risk of a major osteoporotic fracture in the next 10 years.    0.9% risk of hip fracture in the next 10 years.  Impression: *Low bone mass (Osteopenia); FRAX calculations do not support treatment as osteoporosis.  Wide variation in BMD between L3 and L4 vertebral bodies.    RECOMMENDATIONS:  *Daily calcium intake 7819-5563 mg, dietary sources preferred; Vitamin D 8121-8839 IU daily.  *Weight bearing exercise and fall precautions.  *If dedicated imaging is negative for vertebral fracture, then no additional treatment is recommended at this time. If positive for fracture, would treat as osteoporosis.  *Repeat BMD in 2 years.    EXPLANATION OF RESULTS:  T-score compares these results to the average bone density of a 20-29 year-old of the same gender.    Z-score compares this result to the average bone density to people of the same age, gender, and race.    The amounts indicate the number of standard deviations above or below the mean.    * Osteoporosis is generally defined as having a  T-score between less than or equal to -2.5.    * Low bone mass (osteopenia) is generally defined as having a T-score between -1.0 and -2.5.    * The normal range is generally defined as having a T-score greater than or equal to -1.0.    * Calculated FRAX scores for fracture risk prediction may not be accurate in the setting of certain clinical factors such as pharmacologic therapy for osteoporosis, prior fragility fractures, high dose glucocorticoid use.    Electronically signed by: Denisse Mariscal MD  Date:    05/17/2024  Time:    11:26          Assessment/Plan     Chaya Tam is a 60 y.o. female with:  1. Attention deficit disorder (ADD) without hyperactivity  Overview:  Dx'ed as an adult     Assessment & Plan:  Refill  Vyvanse 40mg daily prn next week   Monitor for side effects ie abdominal pain, N/V, headaches, insomnia, weight loss, appetite suppression   Take frequent holidays    Schedule next appt in-person in 3 mo      2. Adjustment insomnia  Assessment & Plan:  Stable Trazodone 50mg po qhs       3. Hot flashes due to menopause  Assessment & Plan:   Cont compounded estradiol gel qhs and prometrium 200mg qhs                      I spent a total of 36 minutes on the day of the visit.This includes face to face time and non-face to face time preparing to see the patient (eg, review of tests), obtaining and/or reviewing separately obtained history, documenting clinical information in the electronic or other health record, independently interpreting results and communicating results to the patient/family/caregiver, or care coordinator.   Code Status:     Celeste Lopez MD

## 2025-06-13 ENCOUNTER — OFFICE VISIT (OUTPATIENT)
Dept: PRIMARY CARE CLINIC | Facility: CLINIC | Age: 60
End: 2025-06-13
Payer: COMMERCIAL

## 2025-06-13 DIAGNOSIS — N95.1 HOT FLASHES DUE TO MENOPAUSE: ICD-10-CM

## 2025-06-13 DIAGNOSIS — F51.02 ADJUSTMENT INSOMNIA: ICD-10-CM

## 2025-06-13 DIAGNOSIS — F98.8 ATTENTION DEFICIT DISORDER (ADD) WITHOUT HYPERACTIVITY: Primary | ICD-10-CM

## 2025-06-13 PROCEDURE — G2211 COMPLEX E/M VISIT ADD ON: HCPCS | Mod: 95,,, | Performed by: INTERNAL MEDICINE

## 2025-06-13 PROCEDURE — 98006 SYNCH AUDIO-VIDEO EST MOD 30: CPT | Mod: 95,,, | Performed by: INTERNAL MEDICINE

## 2025-06-13 RX ORDER — LISDEXAMFETAMINE DIMESYLATE 40 MG/1
40 CAPSULE ORAL DAILY
Qty: 90 CAPSULE | Refills: 0 | Status: SHIPPED | OUTPATIENT
Start: 2025-06-18

## 2025-06-13 NOTE — ASSESSMENT & PLAN NOTE
Refill  Vyvanse 40mg daily prn next week   Monitor for side effects ie abdominal pain, N/V, headaches, insomnia, weight loss, appetite suppression   Take frequent holidays    Schedule next appt in-person in 3 mo

## (undated) DEVICE — SEE MEDLINE ITEM 146298

## (undated) DEVICE — SET IRR URLGY 2LINE UNIV SPIKE

## (undated) DEVICE — GLOVE BIOGEL SKINSENSE PI 6.5

## (undated) DEVICE — APPLICATOR CHLORAPREP ORN 26ML

## (undated) DEVICE — Device

## (undated) DEVICE — GLOVE BIOGEL SKINSENSE PI 8.5

## (undated) DEVICE — TOURNIQUET SB QC SP 34X4IN

## (undated) DEVICE — GAUZE SPONGE 4X4 12PLY

## (undated) DEVICE — SOL IRR NACL .9% 3000ML

## (undated) DEVICE — CLOSURE SKIN STERI STRIP 1/2X4

## (undated) DEVICE — BLADE GATOR 4.2

## (undated) DEVICE — SEE MEDLINE ITEM 146231

## (undated) DEVICE — UNDERGLOVE BIOGEL PI SZ 6.5 LF

## (undated) DEVICE — BANDAGE ACE ELASTIC 6"

## (undated) DEVICE — SEE L#120831

## (undated) DEVICE — SEE MEDLINE ITEM 152523

## (undated) DEVICE — STRIP STERI REIN CLSR 1/2X2IN